# Patient Record
Sex: MALE | Race: BLACK OR AFRICAN AMERICAN | Employment: UNEMPLOYED | URBAN - METROPOLITAN AREA
[De-identification: names, ages, dates, MRNs, and addresses within clinical notes are randomized per-mention and may not be internally consistent; named-entity substitution may affect disease eponyms.]

---

## 2018-03-17 ENCOUNTER — IP HISTORICAL/CONVERTED ENCOUNTER (OUTPATIENT)
Dept: OTHER | Age: 68
End: 2018-03-17

## 2018-11-28 ENCOUNTER — OP HISTORICAL/CONVERTED ENCOUNTER (OUTPATIENT)
Dept: OTHER | Age: 68
End: 2018-11-28

## 2019-04-16 ENCOUNTER — OP HISTORICAL/CONVERTED ENCOUNTER (OUTPATIENT)
Dept: OTHER | Age: 69
End: 2019-04-16

## 2019-08-15 ENCOUNTER — OP HISTORICAL/CONVERTED ENCOUNTER (OUTPATIENT)
Dept: OTHER | Age: 69
End: 2019-08-15

## 2019-08-27 ENCOUNTER — OP HISTORICAL/CONVERTED ENCOUNTER (OUTPATIENT)
Dept: OTHER | Age: 69
End: 2019-08-27

## 2019-10-04 ENCOUNTER — OP HISTORICAL/CONVERTED ENCOUNTER (OUTPATIENT)
Dept: OTHER | Age: 69
End: 2019-10-04

## 2019-10-17 ENCOUNTER — OP HISTORICAL/CONVERTED ENCOUNTER (OUTPATIENT)
Dept: OTHER | Age: 69
End: 2019-10-17

## 2022-06-04 ENCOUNTER — HOSPITAL ENCOUNTER (INPATIENT)
Age: 72
LOS: 17 days | Discharge: NURSING, REHAB OR LONG-TERM CARE | DRG: 330 | End: 2022-06-21
Attending: STUDENT IN AN ORGANIZED HEALTH CARE EDUCATION/TRAINING PROGRAM | Admitting: INTERNAL MEDICINE
Payer: MEDICARE

## 2022-06-04 ENCOUNTER — APPOINTMENT (OUTPATIENT)
Dept: GENERAL RADIOLOGY | Age: 72
DRG: 330 | End: 2022-06-04
Attending: STUDENT IN AN ORGANIZED HEALTH CARE EDUCATION/TRAINING PROGRAM
Payer: MEDICARE

## 2022-06-04 ENCOUNTER — APPOINTMENT (OUTPATIENT)
Dept: CT IMAGING | Age: 72
DRG: 330 | End: 2022-06-04
Attending: STUDENT IN AN ORGANIZED HEALTH CARE EDUCATION/TRAINING PROGRAM
Payer: MEDICARE

## 2022-06-04 ENCOUNTER — APPOINTMENT (OUTPATIENT)
Dept: ENDOSCOPY | Age: 72
DRG: 330 | End: 2022-06-04
Attending: INTERNAL MEDICINE
Payer: MEDICARE

## 2022-06-04 DIAGNOSIS — K56.2 SIGMOID VOLVULUS (HCC): Primary | ICD-10-CM

## 2022-06-04 DIAGNOSIS — I70.202 TIBIAL ARTERY OCCLUSION, LEFT (HCC): ICD-10-CM

## 2022-06-04 DIAGNOSIS — L97.509 FOOT ULCER DUE TO SECONDARY DM (HCC): ICD-10-CM

## 2022-06-04 DIAGNOSIS — I73.9 PVD (PERIPHERAL VASCULAR DISEASE) (HCC): ICD-10-CM

## 2022-06-04 DIAGNOSIS — E13.621 FOOT ULCER DUE TO SECONDARY DM (HCC): ICD-10-CM

## 2022-06-04 DIAGNOSIS — I70.202 PERONEAL ARTERY OCCLUSION, LEFT (HCC): ICD-10-CM

## 2022-06-04 LAB
ALBUMIN SERPL-MCNC: 3.6 G/DL (ref 3.5–5)
ALBUMIN/GLOB SERPL: 0.5 {RATIO} (ref 1.1–2.2)
ALP SERPL-CCNC: 166 U/L (ref 45–117)
ALT SERPL-CCNC: 12 U/L (ref 12–78)
AMMONIA PLAS-SCNC: 27 UMOL/L
ANION GAP SERPL CALC-SCNC: 10 MMOL/L (ref 5–15)
AST SERPL W P-5'-P-CCNC: 9 U/L (ref 15–37)
ATRIAL RATE: 129 BPM
BASOPHILS # BLD: 0 K/UL (ref 0–0.1)
BASOPHILS NFR BLD: 0 % (ref 0–1)
BILIRUB SERPL-MCNC: 0.5 MG/DL (ref 0.2–1)
BUN SERPL-MCNC: 30 MG/DL (ref 6–20)
BUN/CREAT SERPL: 15 (ref 12–20)
CA-I BLD-MCNC: 9.8 MG/DL (ref 8.5–10.1)
CALCULATED P AXIS, ECG09: 49 DEGREES
CALCULATED R AXIS, ECG10: 14 DEGREES
CALCULATED T AXIS, ECG11: 48 DEGREES
CHLORIDE SERPL-SCNC: 103 MMOL/L (ref 97–108)
CO2 SERPL-SCNC: 21 MMOL/L (ref 21–32)
CREAT SERPL-MCNC: 2.02 MG/DL (ref 0.7–1.3)
DIAGNOSIS, 93000: NORMAL
DIFFERENTIAL METHOD BLD: ABNORMAL
EOSINOPHIL # BLD: 0 K/UL (ref 0–0.4)
EOSINOPHIL NFR BLD: 0 % (ref 0–7)
ERYTHROCYTE [DISTWIDTH] IN BLOOD BY AUTOMATED COUNT: 15 % (ref 11.5–14.5)
GLOBULIN SER CALC-MCNC: 6.7 G/DL (ref 2–4)
GLUCOSE BLD STRIP.AUTO-MCNC: 144 MG/DL (ref 65–117)
GLUCOSE BLD STRIP.AUTO-MCNC: 166 MG/DL (ref 65–117)
GLUCOSE BLD STRIP.AUTO-MCNC: 243 MG/DL (ref 65–117)
GLUCOSE SERPL-MCNC: 305 MG/DL (ref 65–100)
HCT VFR BLD AUTO: 36.6 % (ref 36.6–50.3)
HGB BLD-MCNC: 11.8 G/DL (ref 12.1–17)
IMM GRANULOCYTES # BLD AUTO: 0.1 K/UL (ref 0–0.04)
IMM GRANULOCYTES NFR BLD AUTO: 1 % (ref 0–0.5)
LACTATE SERPL-SCNC: 2.5 MMOL/L (ref 0.4–2)
LACTATE SERPL-SCNC: 3 MMOL/L (ref 0.4–2)
LYMPHOCYTES # BLD: 1.7 K/UL (ref 0.8–3.5)
LYMPHOCYTES NFR BLD: 18 % (ref 12–49)
MCH RBC QN AUTO: 27.3 PG (ref 26–34)
MCHC RBC AUTO-ENTMCNC: 32.2 G/DL (ref 30–36.5)
MCV RBC AUTO: 84.5 FL (ref 80–99)
MONOCYTES # BLD: 0.9 K/UL (ref 0–1)
MONOCYTES NFR BLD: 9 % (ref 5–13)
NEUTS SEG # BLD: 6.9 K/UL (ref 1.8–8)
NEUTS SEG NFR BLD: 72 % (ref 32–75)
NRBC # BLD: 0 K/UL (ref 0–0.01)
NRBC BLD-RTO: 0 PER 100 WBC
P-R INTERVAL, ECG05: 138 MS
PERFORMED BY, TECHID: ABNORMAL
PLATELET # BLD AUTO: 352 K/UL (ref 150–400)
PMV BLD AUTO: 11.7 FL (ref 8.9–12.9)
POTASSIUM SERPL-SCNC: 3.6 MMOL/L (ref 3.5–5.1)
PROT SERPL-MCNC: 10.3 G/DL (ref 6.4–8.2)
Q-T INTERVAL, ECG07: 298 MS
QRS DURATION, ECG06: 74 MS
QTC CALCULATION (BEZET), ECG08: 436 MS
RBC # BLD AUTO: 4.33 M/UL (ref 4.1–5.7)
SODIUM SERPL-SCNC: 134 MMOL/L (ref 136–145)
VENTRICULAR RATE, ECG03: 129 BPM
WBC # BLD AUTO: 9.5 K/UL (ref 4.1–11.1)

## 2022-06-04 PROCEDURE — 74011250636 HC RX REV CODE- 250/636: Performed by: INTERNAL MEDICINE

## 2022-06-04 PROCEDURE — 87040 BLOOD CULTURE FOR BACTERIA: CPT

## 2022-06-04 PROCEDURE — 74176 CT ABD & PELVIS W/O CONTRAST: CPT

## 2022-06-04 PROCEDURE — 82962 GLUCOSE BLOOD TEST: CPT

## 2022-06-04 PROCEDURE — 74011250636 HC RX REV CODE- 250/636

## 2022-06-04 PROCEDURE — 93005 ELECTROCARDIOGRAM TRACING: CPT

## 2022-06-04 PROCEDURE — 76040000008: Performed by: INTERNAL MEDICINE

## 2022-06-04 PROCEDURE — 99222 1ST HOSP IP/OBS MODERATE 55: CPT | Performed by: SURGERY

## 2022-06-04 PROCEDURE — 96375 TX/PRO/DX INJ NEW DRUG ADDON: CPT

## 2022-06-04 PROCEDURE — 83605 ASSAY OF LACTIC ACID: CPT

## 2022-06-04 PROCEDURE — 74011250636 HC RX REV CODE- 250/636: Performed by: STUDENT IN AN ORGANIZED HEALTH CARE EDUCATION/TRAINING PROGRAM

## 2022-06-04 PROCEDURE — 80053 COMPREHEN METABOLIC PANEL: CPT

## 2022-06-04 PROCEDURE — 85025 COMPLETE CBC W/AUTO DIFF WBC: CPT

## 2022-06-04 PROCEDURE — 74022 RADEX COMPL AQT ABD SERIES: CPT

## 2022-06-04 PROCEDURE — 99285 EMERGENCY DEPT VISIT HI MDM: CPT

## 2022-06-04 PROCEDURE — 82140 ASSAY OF AMMONIA: CPT

## 2022-06-04 PROCEDURE — 0D7N8ZZ DILATION OF SIGMOID COLON, VIA NATURAL OR ARTIFICIAL OPENING ENDOSCOPIC: ICD-10-PCS | Performed by: INTERNAL MEDICINE

## 2022-06-04 PROCEDURE — 65270000029 HC RM PRIVATE

## 2022-06-04 PROCEDURE — 74011000250 HC RX REV CODE- 250: Performed by: INTERNAL MEDICINE

## 2022-06-04 PROCEDURE — 36415 COLL VENOUS BLD VENIPUNCTURE: CPT

## 2022-06-04 PROCEDURE — 96374 THER/PROPH/DIAG INJ IV PUSH: CPT

## 2022-06-04 PROCEDURE — 74011636637 HC RX REV CODE- 636/637: Performed by: INTERNAL MEDICINE

## 2022-06-04 RX ORDER — HALOPERIDOL 5 MG/ML
2 INJECTION INTRAMUSCULAR
Status: DISCONTINUED | OUTPATIENT
Start: 2022-06-04 | End: 2022-06-21 | Stop reason: HOSPADM

## 2022-06-04 RX ORDER — SODIUM CHLORIDE 0.9 % (FLUSH) 0.9 %
5-40 SYRINGE (ML) INJECTION AS NEEDED
Status: DISCONTINUED | OUTPATIENT
Start: 2022-06-04 | End: 2022-06-11

## 2022-06-04 RX ORDER — METOPROLOL SUCCINATE 50 MG/1
20 TABLET, EXTENDED RELEASE ORAL DAILY
COMMUNITY
Start: 2022-03-15

## 2022-06-04 RX ORDER — ONDANSETRON 2 MG/ML
4 INJECTION INTRAMUSCULAR; INTRAVENOUS
Status: DISCONTINUED | OUTPATIENT
Start: 2022-06-04 | End: 2022-06-13

## 2022-06-04 RX ORDER — POTASSIUM CHLORIDE 20 MEQ/1
20 TABLET, EXTENDED RELEASE ORAL DAILY
COMMUNITY
Start: 2022-05-09

## 2022-06-04 RX ORDER — ACETAMINOPHEN 650 MG/1
650 SUPPOSITORY RECTAL
Status: DISCONTINUED | OUTPATIENT
Start: 2022-06-04 | End: 2022-06-21 | Stop reason: HOSPADM

## 2022-06-04 RX ORDER — METFORMIN HYDROCHLORIDE 500 MG/1
500 TABLET ORAL 2 TIMES DAILY
COMMUNITY
Start: 2022-03-13

## 2022-06-04 RX ORDER — INSULIN DETEMIR 100 [IU]/ML
14 INJECTION, SOLUTION SUBCUTANEOUS DAILY
COMMUNITY
Start: 2022-05-25

## 2022-06-04 RX ORDER — ENOXAPARIN SODIUM 100 MG/ML
40 INJECTION SUBCUTANEOUS DAILY
Status: DISCONTINUED | OUTPATIENT
Start: 2022-06-04 | End: 2022-06-08

## 2022-06-04 RX ORDER — OXYBUTYNIN CHLORIDE 5 MG/1
5 TABLET ORAL DAILY
COMMUNITY
Start: 2022-05-21

## 2022-06-04 RX ORDER — FINASTERIDE 5 MG/1
5 TABLET, FILM COATED ORAL DAILY
COMMUNITY
Start: 2022-05-11

## 2022-06-04 RX ORDER — MIDAZOLAM HYDROCHLORIDE 1 MG/ML
INJECTION INTRAMUSCULAR; INTRAVENOUS AS NEEDED
Status: DISCONTINUED | OUTPATIENT
Start: 2022-06-04 | End: 2022-06-08 | Stop reason: HOSPADM

## 2022-06-04 RX ORDER — SIMETHICONE 125 MG
1 CAPSULE ORAL 3 TIMES DAILY
COMMUNITY

## 2022-06-04 RX ORDER — MAGNESIUM SULFATE 100 %
4 CRYSTALS MISCELLANEOUS AS NEEDED
Status: DISCONTINUED | OUTPATIENT
Start: 2022-06-04 | End: 2022-06-21 | Stop reason: HOSPADM

## 2022-06-04 RX ORDER — FUROSEMIDE 40 MG/1
40 TABLET ORAL DAILY
COMMUNITY
Start: 2022-05-30

## 2022-06-04 RX ORDER — POLYETHYLENE GLYCOL 3350 17 G/17G
17 POWDER, FOR SOLUTION ORAL DAILY PRN
Status: DISCONTINUED | OUTPATIENT
Start: 2022-06-04 | End: 2022-06-21 | Stop reason: HOSPADM

## 2022-06-04 RX ORDER — TAMSULOSIN HYDROCHLORIDE 0.4 MG/1
0.4 CAPSULE ORAL DAILY
COMMUNITY
Start: 2022-05-20

## 2022-06-04 RX ORDER — DILTIAZEM HYDROCHLORIDE 180 MG/1
180 CAPSULE, EXTENDED RELEASE ORAL DAILY
COMMUNITY
Start: 2022-05-22

## 2022-06-04 RX ORDER — PREGABALIN 100 MG/1
100 CAPSULE ORAL 2 TIMES DAILY
COMMUNITY
Start: 2022-05-21 | End: 2022-06-04

## 2022-06-04 RX ORDER — DEXTROSE MONOHYDRATE 100 MG/ML
0-250 INJECTION, SOLUTION INTRAVENOUS AS NEEDED
Status: DISCONTINUED | OUTPATIENT
Start: 2022-06-04 | End: 2022-06-21 | Stop reason: HOSPADM

## 2022-06-04 RX ORDER — PANTOPRAZOLE SODIUM 20 MG/1
20 TABLET, DELAYED RELEASE ORAL DAILY
COMMUNITY
Start: 2022-04-30

## 2022-06-04 RX ORDER — SODIUM CHLORIDE 0.9 % (FLUSH) 0.9 %
5-40 SYRINGE (ML) INJECTION EVERY 8 HOURS
Status: DISCONTINUED | OUTPATIENT
Start: 2022-06-04 | End: 2022-06-11

## 2022-06-04 RX ORDER — INSULIN LISPRO 100 [IU]/ML
INJECTION, SOLUTION INTRAVENOUS; SUBCUTANEOUS EVERY 6 HOURS
Status: DISCONTINUED | OUTPATIENT
Start: 2022-06-04 | End: 2022-06-21 | Stop reason: HOSPADM

## 2022-06-04 RX ORDER — SPIRONOLACTONE 25 MG/1
25 TABLET ORAL DAILY
COMMUNITY
Start: 2022-05-20

## 2022-06-04 RX ORDER — ATORVASTATIN CALCIUM 40 MG/1
40 TABLET, FILM COATED ORAL DAILY
COMMUNITY
Start: 2022-05-20

## 2022-06-04 RX ORDER — PREGABALIN 100 MG/1
100 CAPSULE ORAL 2 TIMES DAILY
COMMUNITY

## 2022-06-04 RX ORDER — ONDANSETRON 2 MG/ML
INJECTION INTRAMUSCULAR; INTRAVENOUS
Status: COMPLETED
Start: 2022-06-04 | End: 2022-06-04

## 2022-06-04 RX ORDER — INSULIN GLARGINE 100 [IU]/ML
10 INJECTION, SOLUTION SUBCUTANEOUS DAILY
Status: DISCONTINUED | OUTPATIENT
Start: 2022-06-04 | End: 2022-06-04

## 2022-06-04 RX ORDER — SODIUM CHLORIDE 9 MG/ML
100 INJECTION, SOLUTION INTRAVENOUS CONTINUOUS
Status: DISPENSED | OUTPATIENT
Start: 2022-06-04 | End: 2022-06-04

## 2022-06-04 RX ORDER — LINAGLIPTIN 5 MG/1
5 TABLET, FILM COATED ORAL DAILY
COMMUNITY

## 2022-06-04 RX ORDER — FENTANYL CITRATE 50 UG/ML
INJECTION, SOLUTION INTRAMUSCULAR; INTRAVENOUS AS NEEDED
Status: DISCONTINUED | OUTPATIENT
Start: 2022-06-04 | End: 2022-06-08 | Stop reason: HOSPADM

## 2022-06-04 RX ORDER — LABETALOL HCL 20 MG/4 ML
10 SYRINGE (ML) INTRAVENOUS
Status: COMPLETED | OUTPATIENT
Start: 2022-06-04 | End: 2022-06-19

## 2022-06-04 RX ORDER — MORPHINE SULFATE 4 MG/ML
4 INJECTION INTRAVENOUS ONCE
Status: COMPLETED | OUTPATIENT
Start: 2022-06-04 | End: 2022-06-04

## 2022-06-04 RX ORDER — ONDANSETRON 4 MG/1
4 TABLET, ORALLY DISINTEGRATING ORAL
Status: DISCONTINUED | OUTPATIENT
Start: 2022-06-04 | End: 2022-06-13

## 2022-06-04 RX ORDER — BETHANECHOL CHLORIDE 25 MG/1
25 TABLET ORAL 3 TIMES DAILY
COMMUNITY
Start: 2022-05-14

## 2022-06-04 RX ORDER — INSULIN GLARGINE 100 [IU]/ML
14 INJECTION, SOLUTION SUBCUTANEOUS DAILY
Status: DISCONTINUED | OUTPATIENT
Start: 2022-06-04 | End: 2022-06-21 | Stop reason: HOSPADM

## 2022-06-04 RX ORDER — ACETAMINOPHEN 325 MG/1
650 TABLET ORAL
Status: DISCONTINUED | OUTPATIENT
Start: 2022-06-04 | End: 2022-06-21 | Stop reason: HOSPADM

## 2022-06-04 RX ORDER — ONDANSETRON 2 MG/ML
4 INJECTION INTRAMUSCULAR; INTRAVENOUS
Status: COMPLETED | OUTPATIENT
Start: 2022-06-04 | End: 2022-06-04

## 2022-06-04 RX ADMIN — MORPHINE SULFATE 4 MG: 4 INJECTION, SOLUTION INTRAMUSCULAR; INTRAVENOUS at 04:30

## 2022-06-04 RX ADMIN — SODIUM CHLORIDE, PRESERVATIVE FREE 5 ML: 5 INJECTION INTRAVENOUS at 06:00

## 2022-06-04 RX ADMIN — SODIUM CHLORIDE, PRESERVATIVE FREE 10 ML: 5 INJECTION INTRAVENOUS at 20:28

## 2022-06-04 RX ADMIN — INSULIN LISPRO 4 UNITS: 100 INJECTION, SOLUTION INTRAVENOUS; SUBCUTANEOUS at 06:20

## 2022-06-04 RX ADMIN — ENOXAPARIN SODIUM 40 MG: 100 INJECTION SUBCUTANEOUS at 10:00

## 2022-06-04 RX ADMIN — INSULIN LISPRO 2 UNITS: 100 INJECTION, SOLUTION INTRAVENOUS; SUBCUTANEOUS at 12:55

## 2022-06-04 RX ADMIN — ONDANSETRON 4 MG: 2 INJECTION INTRAMUSCULAR; INTRAVENOUS at 02:38

## 2022-06-04 RX ADMIN — MORPHINE SULFATE 4 MG: 4 INJECTION, SOLUTION INTRAMUSCULAR; INTRAVENOUS at 02:42

## 2022-06-04 RX ADMIN — SODIUM CHLORIDE 100 ML/HR: 9 INJECTION, SOLUTION INTRAVENOUS at 04:23

## 2022-06-04 RX ADMIN — SODIUM CHLORIDE 500 ML: 9 INJECTION, SOLUTION INTRAVENOUS at 02:12

## 2022-06-04 RX ADMIN — SODIUM CHLORIDE, PRESERVATIVE FREE 10 ML: 5 INJECTION INTRAVENOUS at 17:16

## 2022-06-04 RX ADMIN — INSULIN GLARGINE 14 UNITS: 100 INJECTION, SOLUTION SUBCUTANEOUS at 10:00

## 2022-06-04 NOTE — ED NOTES
TRANSFER - OUT REPORT:    Verbal report given to alana (name) on Alex Olivia  being transferred to (unit) for routine progression of care    Report consisted of patients Situation, Background, Assessment and   Recommendations(SBAR). Information from the following report(s) SBAR, ED Summary, Procedure Summary and MAR was reviewed with the receiving nurse. Lines:   Peripheral IV 06/04/22 Anterior;Left;Proximal Forearm (Active)        Opportunity for questions and clarification was provided.       Patient transported with:   [email or web address]

## 2022-06-04 NOTE — CONSULTS
Consult    Patient: Syed Sanchez MRN: [id number]  SSN: [id number]    YOB: 1950  Age: 66 y.o. Sex: male      Subjective:      Syed Sanchez is a 66 y.o. male who is being seen for abdominal distention,  Multiple medical issue with nursing home transferred to the emergency room with abdominal distention, history of present from patient's sister, patient had some meals last week, had abdominal discomfort. Nursing staff noted that abdominal distention do not have a good vomiting, patient was brought to emergency for evaluation, no documented fever chills nausea vomiting, patient was not to speak due to the deafness. No documented GI bleeding,  Past Medical History:   Diagnosis Date    Anemia     BPH (benign prostatic hyperplasia)     Chronic kidney disease     Diabetes ([facility] [address]     Hypercholesteremia     Hyperlipidemia     Neurological disorder     Nonspeaking deaf      History reviewed. No pertinent surgical history. History reviewed. No pertinent family history.   Social History     Tobacco Use    Smoking status: Not on file    Smokeless tobacco: Not on file   Substance Use Topics    Alcohol use: Not Currently      Current Facility-Administered Medications   Medication Dose Route Frequency Provider Last Rate Last Admin    insulin lispro (HUMALOG) injection   SubCUTAneous Q6H Tiffanie Prasad MD   2 Units at 06/04/22 1255    glucose chewable tablet 16 g  4 Tablet Oral PRN Tiffanie Prasad MD        glucagon (GLUCAGEN) injection 1 mg  1 mg IntraMUSCular PRN Nguyen Fischer MD        dextrose 10% infusion 0-250 mL  0-250 mL IntraVENous PRN Nguyen Fischer MD        labetaloL (NORMODYNE;TRANDATE) 20 mg/4 mL (5 mg/mL) injection 10 mg  10 mg IntraVENous Q4H PRN Nguyen Fischer MD        sodium chloride (NS) flush 5-40 mL  5-40 mL IntraVENous Q8H Tiffanie Prasad MD   5 mL at 06/04/22 0600    sodium chloride (NS) flush 5-40 mL  5-40 mL IntraVENous PRN Nguyen Fischer MD        acetaminophen (TYLENOL) tablet 650 mg  650 mg Oral Q6H PRN Odin Prasad MD        Or    acetaminophen (TYLENOL) suppository 650 mg  650 mg Rectal Q6H PRN Odin Prasad MD        polyethylene glycol (MIRALAX) packet 17 g  17 g Oral DAILY PRN Yvonne Amador MD        ondansetron (ZOFRAN ODT) tablet 4 mg  4 mg Oral Q8H PRN Odin Prasad MD        Or    ondansetron (ZOFRAN) injection 4 mg  4 mg IntraVENous Q6H PRN Yvonne Amador MD        enoxaparin (LOVENOX) injection 40 mg  40 mg SubCUTAneous DAILY Odin Prasad MD   40 mg at 06/04/22 1000    haloperidol lactate (HALDOL) injection 2 mg  2 mg IntraMUSCular Q4H PRN Yvonne Amador MD        0.9% sodium chloride infusion  100 mL/hr IntraVENous CONTINUOUS Irina KamranOdin goldberg  mL/hr at 06/04/22 0423 100 mL/hr at 06/04/22 0423    insulin glargine (LANTUS) injection 14 Units  14 Units SubCUTAneous DAILY Yvonne Amador MD   14 Units at 06/04/22 1000     Current Outpatient Medications   Medication Sig Dispense Refill    pregabalin (LYRICA) 100 mg capsule Take 100 mg by mouth two (2) times a day.  atorvastatin (LIPITOR) 40 mg tablet Take 40 mg by mouth daily.  bethanechol (URECHOLINE) 25 mg tablet Take 25 mg by mouth three (3) times daily.  dilTIAZem ER (DILACOR XR) 180 mg capsule Take 180 mg by mouth daily.  finasteride (PROSCAR) 5 mg tablet Take 5 mg by mouth daily.  furosemide (LASIX) 40 mg tablet Take 40 mg by mouth daily.  Levemir U-100 Insulin 100 unit/mL injection 14 Units by SubCUTAneous route daily.  linaGLIPtin (Tradjenta) 5 mg tablet Take 5 mg by mouth daily.  metFORMIN (GLUCOPHAGE) 500 mg tablet Take 500 mg by mouth two (2) times a day.  metoprolol succinate (TOPROL-XL) 50 mg XL tablet Take 20 mg by mouth daily.  oxybutynin (DITROPAN) 5 mg tablet Take 5 mg by mouth daily.  pantoprazole (PROTONIX) 20 mg tablet Take 20 mg by mouth daily.       potassium chloride (K-DUR, KLOR-CON M20) 20 mEq tablet Take 20 mEq by mouth daily.  simethicone (GAS-X) 125 mg capsule Take 1 Capsule by mouth three (3) times daily.  spironolactone (ALDACTONE) 25 mg tablet Take 25 mg by mouth daily.  tamsulosin (FLOMAX) 0.4 mg capsule Take 0.4 mg by mouth daily. Allergies   Allergen Reactions    Lisinopril Unknown (comments)       Review of Systems:  Review of Systems   Unable to perform ROS: Medical condition   Eyes: Negative for double vision. Gastrointestinal: Positive for abdominal pain. Objective:     Vitals:    06/04/22 0500 06/04/22 0615 06/04/22 1115 06/04/22 1246   BP: (!) 161/88 (!) 144/84 (!) 169/89 (!) 141/77   Pulse: (!) 118 (!) 116 (!) 116 (!) 117   Resp: 17 15 13 16   Temp:       SpO2: 95% 96% 96% 96%   Weight:       Height:            Physical Exam:  Physical Exam  Constitutional:       Appearance: He is ill-appearing. HENT:      Head: Atraumatic. Eyes:      General: No scleral icterus. Extraocular Movements: Extraocular movements intact. Cardiovascular:      Rate and Rhythm: Normal rate. Abdominal:      General: There is distension. Tenderness: There is abdominal tenderness. There is no rebound. Skin:     General: Skin is warm. Neurological:      Mental Status: Mental status is at baseline. Recent Results (from the past 24 hour(s))   CBC WITH AUTOMATED DIFF    Collection Time: 06/04/22  1:57 AM   Result Value Ref Range    WBC 9.5 4.1 - 11.1 K/uL    RBC 4.33 4.10 - 5.70 M/uL    HGB 11.8 (L) 12.1 - 17.0 g/dL    HCT 36.6 36.6 - 50.3 %    MCV 84.5 80.0 - 99.0 FL    MCH 27.3 26.0 - 34.0 PG    MCHC 32.2 30.0 - 36.5 g/dL    RDW 15.0 (H) 11.5 - 14.5 %    PLATELET 590 146 - 012 K/uL    MPV 11.7 8.9 - 12.9 FL    NRBC 0.0 0.0  WBC    ABSOLUTE NRBC 0.00 0.00 - 0.01 K/uL    NEUTROPHILS 72 32 - 75 %    LYMPHOCYTES 18 12 - 49 %    MONOCYTES 9 5 - 13 %    EOSINOPHILS 0 0 - 7 %    BASOPHILS 0 0 - 1 %    IMMATURE GRANULOCYTES 1 (H) 0 - 0.5 %    ABS.  NEUTROPHILS 6.9 1.8 - 8.0 K/UL ABS. LYMPHOCYTES 1.7 0.8 - 3.5 K/UL    ABS. MONOCYTES 0.9 0.0 - 1.0 K/UL    ABS. EOSINOPHILS 0.0 0.0 - 0.4 K/UL    ABS. BASOPHILS 0.0 0.0 - 0.1 K/UL    ABS. IMM. GRANS. 0.1 (H) 0.00 - 0.04 K/UL    DF AUTOMATED     METABOLIC PANEL, COMPREHENSIVE    Collection Time: 06/04/22  1:57 AM   Result Value Ref Range    Sodium 134 (L) 136 - 145 mmol/L    Potassium 3.6 3.5 - 5.1 mmol/L    Chloride 103 97 - 108 mmol/L    CO2 21 21 - 32 mmol/L    Anion gap 10 5 - 15 mmol/L    Glucose 305 (H) 65 - 100 mg/dL    BUN 30 (H) 6 - 20 mg/dL    Creatinine 2.02 (H) 0.70 - 1.30 mg/dL    BUN/Creatinine ratio 15 12 - 20      GFR est AA 39 (L) >60 ml/min/1.73m2    GFR est non-AA 33 (L) >60 ml/min/1.73m2    Calcium 9.8 8.5 - 10.1 mg/dL    Bilirubin, total 0.5 0.2 - 1.0 mg/dL    AST (SGOT) 9 (L) 15 - 37 U/L    ALT (SGPT) 12 12 - 78 U/L    Alk. phosphatase 166 (H) 45 - 117 U/L    Protein, total 10.3 (H) 6.4 - 8.2 g/dL    Albumin 3.6 3.5 - 5.0 g/dL    Globulin 6.7 (H) 2.0 - 4.0 g/dL    A-G Ratio 0.5 (L) 1.1 - 2.2     LACTIC ACID    Collection Time: 06/04/22  1:57 AM   Result Value Ref Range    Lactic acid 3.0 (HH) 0.4 - 2.0 mmol/L   AMMONIA    Collection Time: 06/04/22  1:57 AM   Result Value Ref Range    Ammonia, plasma 27 <32 umol/L   LACTIC ACID    Collection Time: 06/04/22  4:40 AM   Result Value Ref Range    Lactic acid 2.5 (HH) 0.4 - 2.0 mmol/L   GLUCOSE, POC    Collection Time: 06/04/22  6:14 AM   Result Value Ref Range    Glucose (POC) 243 (H) 65 - 117 mg/dL    Performed by Rosibel POC    Collection Time: 06/04/22 11:18 AM   Result Value Ref Range    Glucose (POC) 166 (H) 65 - 117 mg/dL    Performed by LOVELY [facility]BRECHTMayo Clinic RochesterGEN         XR ABD ACUTE W 1 V CHEST   Final Result   1. Enteric tube insertion. 2. Persistent distention of small and large bowel. 3. Mild bibasilar atelectasis. CT ABD PELV WO CONT   Final Result   Sigmoid volvulus.          Assessment:     Hospital Problems  Never Reviewed          Codes Class Noted POA    Sigmoid volvulus ([facility] [address] ICD-10-CM: K56.2  ICD-9-CM: 560.2  6/4/2022 Unknown          Severe abdominal distention,  Sigmoid colon volvulus,  Rule out ischemia  Plan:   Continue NG tube suction  IV hydration  Potassium replacement  Dr. Marvina Krabbe to follow for possible surgical ablation  Antibiotic, wound care for diabetic foot      Will do an urgent sigmoidoscopy with decompression,   indication, risks, options discussed with patient sister  High Risks patient for perforations,  Signed By: Varsha Brown MD     June 4, 2022         Thank you for allowing me to participate in this patients care  Cc Referring Physician   None

## 2022-06-04 NOTE — Clinical Note
sheath exchanged for SHEATH INTRO 6FR L10CM MINI GWIRE L45CM 0.035IN COR KINK. Upon evaluation of the common femoral artery stick using fluoroscopy, the access site puncture was within the safe zone.

## 2022-06-04 NOTE — PROGRESS NOTES
TRANSFER - IN REPORT:    Verbal report received from Anatoliy Robles RN(name) on Ghislaine Kirk  being received from ED(unit) for routine progression of care      Report consisted of patients Situation, Background, Assessment and   Recommendations(SBAR). Information from the following report(s) SBAR, Kardex, ED Summary, Procedure Summary, Intake/Output, MAR, Recent Results and Med Rec Status was reviewed with the receiving nurse. Opportunity for questions and clarification was provided. Assessment completed upon patients arrival to unit and care assumed.

## 2022-06-04 NOTE — PROGRESS NOTES
Hospitalist Progress Note         Garrison Olivo MD          Daily Progress Note: 6/4/2022      Subjective: The patient is seen for follow  up.  66-year-old with history of schizophrenia, hyperlipidemia, deaf, non verbal admitted for work-up of abdominal distention. CT abdomen pelvis suspicious for sigmoid volvulus. Patient seen by general surgeon with plans of colonic decompression. Admission H&P as well as care plan reviewed  Total time spent with patient: 30 minutes.     Garrison Olivo MD

## 2022-06-04 NOTE — CONSULTS
History and Physical    Chief complaints: Sigmoid volvulus   History of Presenting Illness:  Halie Fischer is a 66 y.o. very pleasant man presents to hospital with abdominal distention. Patient seen and examined in emergency room. Patient has been heavily sedated when I saw the patient. Patient is not able to provide any history. Chart reviewed. CT scans reviewed. Patient has significant past medical history of chronic kidney disease, diabetes and schizophrenia and patient is not speaking due to deaf. Past Medical History:   Diagnosis Date    Anemia     BPH (benign prostatic hyperplasia)     Chronic kidney disease     Diabetes ([facility] [address]     Hypercholesteremia     Hyperlipidemia     Neurological disorder     Nonspeaking deaf       History reviewed. No pertinent surgical history. History reviewed. No pertinent family history. Social History     Tobacco Use    Smoking status: Not on file    Smokeless tobacco: Not on file   Substance Use Topics    Alcohol use: Not Currently       Prior to Admission medications    Medication Sig Start Date End Date Taking? Authorizing Provider   pregabalin (LYRICA) 100 mg capsule Take 100 mg by mouth two (2) times a day. Yes Provider, Historical   atorvastatin (LIPITOR) 40 mg tablet Take 40 mg by mouth daily. 5/20/22   Provider, Historical   bethanechol (URECHOLINE) 25 mg tablet Take 25 mg by mouth three (3) times daily. 5/14/22   Provider, Historical   dilTIAZem ER (DILACOR XR) 180 mg capsule Take 180 mg by mouth daily. 5/22/22   Provider, Historical   finasteride (PROSCAR) 5 mg tablet Take 5 mg by mouth daily. 5/11/22   Provider, Historical   furosemide (LASIX) 40 mg tablet Take 40 mg by mouth daily. 5/30/22   Provider, Historical   Levemir U-100 Insulin 100 unit/mL injection 14 Units by SubCUTAneous route daily. 5/25/22   Provider, Historical   linaGLIPtin (Tradjenta) 5 mg tablet Take 5 mg by mouth daily.     Provider, Historical   metFORMIN (GLUCOPHAGE) 500 mg tablet Take 500 mg by mouth two (2) times a day. 3/13/22   Provider, Historical   metoprolol succinate (TOPROL-XL) 50 mg XL tablet Take 20 mg by mouth daily. 3/15/22   Provider, Historical   oxybutynin (DITROPAN) 5 mg tablet Take 5 mg by mouth daily. 5/21/22   Provider, Historical   pantoprazole (PROTONIX) 20 mg tablet Take 20 mg by mouth daily. 4/30/22   Provider, Historical   potassium chloride (K-DUR, KLOR-CON M20) 20 mEq tablet Take 20 mEq by mouth daily. 5/9/22   Provider, Historical   simethicone (GAS-X) 125 mg capsule Take 1 Capsule by mouth three (3) times daily. Provider, Historical   spironolactone (ALDACTONE) 25 mg tablet Take 25 mg by mouth daily. 5/20/22   Provider, Historical   tamsulosin (FLOMAX) 0.4 mg capsule Take 0.4 mg by mouth daily. 5/20/22   Provider, Historical     Allergies   Allergen Reactions    Lisinopril Unknown (comments)        Review of Systems:  Pertinent review of systems discussed in HPI, and rest of organ systems personally reviewed and they are negative. Objective:   Vital signs reviewed:      Visit Vitals  BP (!) 144/84 (BP 1 Location: Right upper arm, BP Patient Position: At rest)   Pulse (!) 116   Temp 98.9 °F (37.2 °C)   Resp 15   Ht 5' 10\" (1.778 m)   Wt 185 lb (83.9 kg)   SpO2 96%   BMI 26.54 kg/m²       Physical Exam:   General appearance:   Patient is quite coherent. Head and neck atraumatic normocephalic. ENT shows normal oral mucosa, no jaundice no hoarse voice. Eyes: Pupil equal gaze appropriate. Cardiac system regular rate rhythm. Pulmonary: No audible wheeze. Chest wall: Chest wall excursion normal with respiration cycle, there is no deformity or chest trauma. Abdomen: Abdomen is grossly distended and taught,   Neurologic: Nonfocal.  Cranial nerves intact, no new focal findings. Musculoskeletal system:  Motor function normal limits, motor function 5 out of 5, range of motion normal in all 4 extremity  Skin: Warm and moist.  Hematologic system: No obvious bruising. Psychosocial: Appropriate and cooperative. Vascular examination: Lower and upper extremities warm to touch, no signs of ischemia or cyanosis. Data Review: Labs are reviewed. Discussed  Recent Results (from the past 24 hour(s))   CBC WITH AUTOMATED DIFF    Collection Time: 06/04/22  1:57 AM   Result Value Ref Range    WBC 9.5 4.1 - 11.1 K/uL    RBC 4.33 4.10 - 5.70 M/uL    HGB 11.8 (L) 12.1 - 17.0 g/dL    HCT 36.6 36.6 - 50.3 %    MCV 84.5 80.0 - 99.0 FL    MCH 27.3 26.0 - 34.0 PG    MCHC 32.2 30.0 - 36.5 g/dL    RDW 15.0 (H) 11.5 - 14.5 %    PLATELET 477 583 - 732 K/uL    MPV 11.7 8.9 - 12.9 FL    NRBC 0.0 0.0  WBC    ABSOLUTE NRBC 0.00 0.00 - 0.01 K/uL    NEUTROPHILS 72 32 - 75 %    LYMPHOCYTES 18 12 - 49 %    MONOCYTES 9 5 - 13 %    EOSINOPHILS 0 0 - 7 %    BASOPHILS 0 0 - 1 %    IMMATURE GRANULOCYTES 1 (H) 0 - 0.5 %    ABS. NEUTROPHILS 6.9 1.8 - 8.0 K/UL    ABS. LYMPHOCYTES 1.7 0.8 - 3.5 K/UL    ABS. MONOCYTES 0.9 0.0 - 1.0 K/UL    ABS. EOSINOPHILS 0.0 0.0 - 0.4 K/UL    ABS. BASOPHILS 0.0 0.0 - 0.1 K/UL    ABS. IMM. GRANS. 0.1 (H) 0.00 - 0.04 K/UL    DF AUTOMATED     METABOLIC PANEL, COMPREHENSIVE    Collection Time: 06/04/22  1:57 AM   Result Value Ref Range    Sodium 134 (L) 136 - 145 mmol/L    Potassium 3.6 3.5 - 5.1 mmol/L    Chloride 103 97 - 108 mmol/L    CO2 21 21 - 32 mmol/L    Anion gap 10 5 - 15 mmol/L    Glucose 305 (H) 65 - 100 mg/dL    BUN 30 (H) 6 - 20 mg/dL    Creatinine 2.02 (H) 0.70 - 1.30 mg/dL    BUN/Creatinine ratio 15 12 - 20      GFR est AA 39 (L) >60 ml/min/1.73m2    GFR est non-AA 33 (L) >60 ml/min/1.73m2    Calcium 9.8 8.5 - 10.1 mg/dL    Bilirubin, total 0.5 0.2 - 1.0 mg/dL    AST (SGOT) 9 (L) 15 - 37 U/L    ALT (SGPT) 12 12 - 78 U/L    Alk.  phosphatase 166 (H) 45 - 117 U/L    Protein, total 10.3 (H) 6.4 - 8.2 g/dL    Albumin 3.6 3.5 - 5.0 g/dL    Globulin 6.7 (H) 2.0 - 4.0 g/dL    A-G Ratio 0.5 (L) 1.1 - 2.2     LACTIC ACID    Collection Time: 06/04/22  1:57 AM   Result Value Ref Range    Lactic acid 3.0 (HH) 0.4 - 2.0 mmol/L   AMMONIA    Collection Time: 06/04/22  1:57 AM   Result Value Ref Range    Ammonia, plasma 27 <32 umol/L   LACTIC ACID    Collection Time: 06/04/22  4:40 AM   Result Value Ref Range    Lactic acid 2.5 (HH) 0.4 - 2.0 mmol/L   GLUCOSE, POC    Collection Time: 06/04/22  6:14 AM   Result Value Ref Range    Glucose (POC) 243 (H) 65 - 117 mg/dL    Performed by Poonam Lopes reviewed: discussed as below. No name on file. Assessment:     Active Problems:    Sigmoid volvulus ([address] (6/4/2022)        Plan:                     Imagings reviewed: discussed as below. No name on file. Assessment:     Active Problems:    Sigmoid volvulus ([address] (6/4/2022)        Plan:     I did review CT scan abdomen pelvis. Small bowel and stomach is grossly distended. Also colonic distention noted. There is no stranding of the colonic bowel wall. Patient has NG tube in place. Patient will need urgent  colonic decompression. Recommend GI consultation for possible flexible sigmoidoscopy for colonic decompression. If this can not be done, I will try to do rectal decompression at bedside. If conservative management does not improve or recurrence of sigmoid volvulus after successful decompression of colon, patient may require surgical intervention. I will continue monitor his progress.

## 2022-06-04 NOTE — PROCEDURES
Summary of a sigmoid colonoscopy    Indication sigmoid colon volvulus    Findings  Liquid stool in the rectal  A twisted point at the sigmoid colon, 34 cm from the anal enter  Severe dilated  sigmoid colon noted, decompression performed, large amount of air was sucked out, a colon tube placed without event      Recommendations:  Continue NG, rectal tube suction intermittent.   N.p.o. for now  IV hydrations  Repeat KUB in the morning  May need a surgical intervention should the volvulus persist

## 2022-06-04 NOTE — ED TRIAGE NOTES
Pt arrives via EMS. Pt resides at [facility]. Pt has abd distension and n/v. Nursing home put in order for pt to get a liver scan, but the company the nursing facility uses says that they will be unsure when he can get the scan. Pt is deaf.

## 2022-06-04 NOTE — H&P
History and Physical    Patient: Lakesha Aiken MRN: [id number]  SSN: [id number]    YOB: 1950  Age: 66 y.o. Sex: male      Subjective:      Lakesha Aiken is a 66 y.o. male with PMH of BPH, CKD, diabetes, HLP, schizophrenia and deaf (non-speaking). He presented from [facility] for abdominal distention, nausea and vomiting. Unfortunately patient couldn't speak and unable to give history. No additional history was available from EMS. In the ED, noted creatinine 2, unknown baseline. LA of 3. CT scan showed sigmoid volvulus. When NG tube placed, patient kept trying to pull it out and had to be put on soft restrain. Past Medical History:   Diagnosis Date    Anemia     BPH (benign prostatic hyperplasia)     Chronic kidney disease     Diabetes ([facility] [address]     Hypercholesteremia     Hyperlipidemia     Neurological disorder     Nonspeaking deaf      History reviewed. No pertinent surgical history. History reviewed. No pertinent family history. Social History     Tobacco Use    Smoking status: Not on file    Smokeless tobacco: Not on file   Substance Use Topics    Alcohol use: Not Currently      Prior to Admission medications    Medication Sig Start Date End Date Taking? Authorizing Provider   atorvastatin (LIPITOR) 40 mg tablet Take 40 mg by mouth daily. 5/20/22   Provider, Historical   bethanechol (URECHOLINE) 25 mg tablet Take 25 mg by mouth three (3) times daily. 5/14/22   Provider, Historical   dilTIAZem ER (DILACOR XR) 180 mg capsule Take 180 mg by mouth daily. 5/22/22   Provider, Historical   finasteride (PROSCAR) 5 mg tablet Take 5 mg by mouth daily. 5/11/22   Provider, Historical   furosemide (LASIX) 40 mg tablet Take 40 mg by mouth daily. 5/30/22   Provider, Historical   Levemir U-100 Insulin 100 unit/mL injection 10 Units by SubCUTAneous route daily. 5/25/22   Provider, Historical   linaGLIPtin (Tradjenta) 5 mg tablet Take 5 mg by mouth daily.     Provider, Historical metFORMIN (GLUCOPHAGE) 500 mg tablet Take 500 mg by mouth two (2) times a day. 3/13/22   Provider, Historical   metoprolol succinate (TOPROL-XL) 50 mg XL tablet Take 20 mg by mouth daily. 3/15/22   Provider, Historical   oxybutynin (DITROPAN) 5 mg tablet Take 5 mg by mouth daily. 5/21/22   Provider, Historical   pantoprazole (PROTONIX) 20 mg tablet Take 20 mg by mouth daily. 4/30/22   Provider, Historical   potassium chloride (K-DUR, KLOR-CON M20) 20 mEq tablet Take 20 mEq by mouth daily. 5/9/22   Provider, Historical   pregabalin (LYRICA) 100 mg capsule Take 100 mg by mouth two (2) times a day. 5/21/22   Provider, Historical   simethicone (GAS-X) 125 mg capsule Take 1 Capsule by mouth three (3) times daily. Provider, Historical   spironolactone (ALDACTONE) 25 mg tablet Take 25 mg by mouth daily. 5/20/22   Provider, Historical   tamsulosin (FLOMAX) 0.4 mg capsule Take 0.4 mg by mouth daily. 5/20/22   Provider, Historical        Allergies   Allergen Reactions    Lisinopril Unknown (comments)       Review of Systems:   Omitted as patient is unable to cooperate. Non-communicative. Objective:     Vitals:    06/04/22 0145 06/04/22 0215 06/04/22 0311   BP: (!) 144/91  (!) 145/89   Pulse: (!) 130  (!) 119   Resp: (!) 38  18   Temp: 98.9 °F (37.2 °C)     SpO2: 96% 97% 97%   Weight: 83.9 kg (185 lb)     Height: 5' 10\" (1.778 m)          Physical Exam:   General: alert, no distress  Eye: conjunctivae/corneas clear. PERRL, EOM's intact. Throat and Neck: normal and no erythema or exudates noted. No mass   Lung: clear to auscultation bilaterally  Heart: regular rate and rhythm, tachycardia  Abdomen: firm, distended and generalized tenderness. Bowel sounds hypoactive. No masses,  Extremities:  able to move all extremities normal, atraumatic  Skin: Normal.  Neurologic: Motor function and sensation grossly intact.   Psychiatric: non focal    Recent Results (from the past 24 hour(s))   CBC WITH AUTOMATED DIFF Collection Time: 06/04/22  1:57 AM   Result Value Ref Range    WBC 9.5 4.1 - 11.1 K/uL    RBC 4.33 4.10 - 5.70 M/uL    HGB 11.8 (L) 12.1 - 17.0 g/dL    HCT 36.6 36.6 - 50.3 %    MCV 84.5 80.0 - 99.0 FL    MCH 27.3 26.0 - 34.0 PG    MCHC 32.2 30.0 - 36.5 g/dL    RDW 15.0 (H) 11.5 - 14.5 %    PLATELET 650 891 - 809 K/uL    MPV 11.7 8.9 - 12.9 FL    NRBC 0.0 0.0  WBC    ABSOLUTE NRBC 0.00 0.00 - 0.01 K/uL    NEUTROPHILS 72 32 - 75 %    LYMPHOCYTES 18 12 - 49 %    MONOCYTES 9 5 - 13 %    EOSINOPHILS 0 0 - 7 %    BASOPHILS 0 0 - 1 %    IMMATURE GRANULOCYTES 1 (H) 0 - 0.5 %    ABS. NEUTROPHILS 6.9 1.8 - 8.0 K/UL    ABS. LYMPHOCYTES 1.7 0.8 - 3.5 K/UL    ABS. MONOCYTES 0.9 0.0 - 1.0 K/UL    ABS. EOSINOPHILS 0.0 0.0 - 0.4 K/UL    ABS. BASOPHILS 0.0 0.0 - 0.1 K/UL    ABS. IMM. GRANS. 0.1 (H) 0.00 - 0.04 K/UL    DF AUTOMATED     METABOLIC PANEL, COMPREHENSIVE    Collection Time: 06/04/22  1:57 AM   Result Value Ref Range    Sodium 134 (L) 136 - 145 mmol/L    Potassium 3.6 3.5 - 5.1 mmol/L    Chloride 103 97 - 108 mmol/L    CO2 21 21 - 32 mmol/L    Anion gap 10 5 - 15 mmol/L    Glucose 305 (H) 65 - 100 mg/dL    BUN 30 (H) 6 - 20 mg/dL    Creatinine 2.02 (H) 0.70 - 1.30 mg/dL    BUN/Creatinine ratio 15 12 - 20      GFR est AA 39 (L) >60 ml/min/1.73m2    GFR est non-AA 33 (L) >60 ml/min/1.73m2    Calcium 9.8 8.5 - 10.1 mg/dL    Bilirubin, total 0.5 0.2 - 1.0 mg/dL    AST (SGOT) 9 (L) 15 - 37 U/L    ALT (SGPT) 12 12 - 78 U/L    Alk. phosphatase 166 (H) 45 - 117 U/L    Protein, total 10.3 (H) 6.4 - 8.2 g/dL    Albumin 3.6 3.5 - 5.0 g/dL    Globulin 6.7 (H) 2.0 - 4.0 g/dL    A-G Ratio 0.5 (L) 1.1 - 2.2     LACTIC ACID    Collection Time: 06/04/22  1:57 AM   Result Value Ref Range    Lactic acid 3.0 (HH) 0.4 - 2.0 mmol/L   AMMONIA    Collection Time: 06/04/22  1:57 AM   Result Value Ref Range    Ammonia, plasma 27 <32 umol/L       XR Results (maximum last 3):   Results from [place] encounter on 06/04/22    XR ABD ACUTE W 1 V CHEST    Narrative  XR ABD ACUTE W 1 V CHEST    Comparison: Noncontrasted CT scan of the abdomen and pelvis obtained on the same  date. Single view chest: The lungs are underinflated with bibasilar subsegmental  atelectasis. No pneumothorax or pleural effusion apparent. The heart size is  magnified by AP technique. There is no evidence of acute cardiac decompensation. 2 view abdomen: Persistent distention of small and large bowel. Enteric tube has  been inserted with tip projecting over the stomach. Inferior vena cava filter  noted. Negative for pneumoperitoneum. Impression  1. Enteric tube insertion. 2. Persistent distention of small and large bowel. 3. Mild bibasilar atelectasis. CT Results (maximum last 3): Results from [place] encounter on 06/04/22    CT ABD PELV WO CONT    Narrative  CT ABD PELV WO CONT    Technique: Noncontrasted CT scan of the abdomen and pelvis was performed  utilizing transaxial images obtained from the dome of the diaphragm to the pubic  symphysis. Coronal and sagittal reconstructions were generated. Dose Reduction:  All CT scans at this facility are performed using dose reduction optimization  techniques as appropriate to a performed exam including the following: Automated  exposure control, adjustments of the mA and/or kV according to patient size, or  use of iterative reconstruction technique. Comparison:  None available    Findings: There is mild bibasilar subsegmental atelectasis. Small calcified  splenic granulomata are noted. The liver, pancreas, adrenal glands, and right  kidney are unremarkable for noncontrasted technique. Left lower pole parapelvic  cyst versus calyceal dilatation containing a 4 mm calculus. The gallbladder is  unremarkable. No biliary duct dilatation apparent. The abdominal aorta is normal  in caliber. Inferior vena cava filter noted.     The colon is distended to the level of the mid sigmoid colon with beaking and  swirling mesenteric configuration consistent with sigmoid volvulus. There is  mild small bowel dilatation with distal ileum decompressed and caliber change at  the site of mesenteric swirling. No free fluid identified. Negative for pneumoperitoneum. Urinary bladder is  unremarkable. The prostate gland is large measuring 5.5 cm in transverse  diameter. Seminal vesicles are unremarkable. The appendix is nonvisualized. There is advanced osteoarthritis of the right hip. No suspicious lytic or  blastic lesion apparent. Impression  Sigmoid volvulus. MRI Results (maximum last 3): No results found for this or any previous visit. Nuclear Medicine Results (maximum last 3): No results found for this or any previous visit. US Results (maximum last 3): No results found for this or any previous visit. Assessment and plan:   # Sigmoid volvulus  - Keep NPO  - Consult surgery  - IVF infusion    # Lactic acidosis  - Likely secondary to nausea from above  - Fluid resuscitation and recheck in AM     # Nausea, vomiting  - Zofran PRN    # Deaf  - Non-communicative. Currently calm  - Haldol PRN     # Diabetes  - Continue home dose lantus  - POC + correctional insulin q6h    # CKD, stage II  - monitor for now. # Urinary retention.  - Monitor urine output.  - Consider gabriel cath if needed. # Full code by default, need further clarification    # Medication list reviewed on Epic and/or outside documentation. Not reviewed with patient.         Signed By: Malik Ramos MD     June 4, 2022

## 2022-06-04 NOTE — ED PROVIDER NOTES
[id number]  EMERGENCY DEPARTMENT ENCOUNTER NOTE    Date: 6/4/2022  Patient Name: Ko Arellano      History of Presenting Illness     Chief Complaint   Patient presents with    Nausea    Abdominal Pain       History Provided By: EMS    HPI: Ko Arellano, 66 y.o. male with PMH of DM, HTN, HLD, CVA with aphasia and deafness, BPH and CKD presenting to the emergency department from SNF with abdominal distention and vomiting. Coming from [address] [place] [address]. EMS were unable to give me a timeline on the onset of symptoms. No known history of liver disease. I was not able to obtain additional information due to patient's status, deafness and aphasia. There are no other complaints, changes, or physical findings at this time. PCP: None    Current Facility-Administered Medications   Medication Dose Route Frequency Provider Last Rate Last Admin    morphine injection 4 mg  4 mg IntraVENous ONCE Jesus Lambert MD         Current Outpatient Medications   Medication Sig Dispense Refill    atorvastatin (LIPITOR) 40 mg tablet Take 40 mg by mouth daily.  bethanechol (URECHOLINE) 25 mg tablet Take 25 mg by mouth three (3) times daily.  dilTIAZem ER (DILACOR XR) 180 mg capsule Take 180 mg by mouth daily.  finasteride (PROSCAR) 5 mg tablet Take 5 mg by mouth daily.  furosemide (LASIX) 40 mg tablet Take 40 mg by mouth daily.  Levemir U-100 Insulin 100 unit/mL injection 10 Units by SubCUTAneous route daily.  linaGLIPtin (Tradjenta) 5 mg tablet Take 5 mg by mouth daily.  metFORMIN (GLUCOPHAGE) 500 mg tablet Take 500 mg by mouth two (2) times a day.  metoprolol succinate (TOPROL-XL) 50 mg XL tablet Take 20 mg by mouth daily.  oxybutynin (DITROPAN) 5 mg tablet Take 5 mg by mouth daily.  pantoprazole (PROTONIX) 20 mg tablet Take 20 mg by mouth daily.       potassium chloride (K-DUR, KLOR-CON M20) 20 mEq tablet Take 20 mEq by mouth daily.      pregabalin (LYRICA) 100 mg capsule Take 100 mg by mouth two (2) times a day.  simethicone (GAS-X) 125 mg capsule Take 1 Capsule by mouth three (3) times daily.  spironolactone (ALDACTONE) 25 mg tablet Take 25 mg by mouth daily.  tamsulosin (FLOMAX) 0.4 mg capsule Take 0.4 mg by mouth daily. Past History     Past Medical History:  Past Medical History:   Diagnosis Date    Anemia     BPH (benign prostatic hyperplasia)     Chronic kidney disease     Diabetes ([facility] [address]     Hypercholesteremia     Hyperlipidemia     Neurological disorder     Nonspeaking deaf        Past Surgical History:  History reviewed. No pertinent surgical history. Family History:  History reviewed. No pertinent family history. Social History:  Social History     Tobacco Use    Smoking status: Not on file    Smokeless tobacco: Not on file   Substance Use Topics    Alcohol use: Not Currently    Drug use: Not on file       Allergies: Allergies   Allergen Reactions    Lisinopril Unknown (comments)         Review of Systems     Review of Systems   Unable to perform ROS: Other   Per HPI    Physical Exam     Physical Exam  Vitals and nursing note reviewed. Constitutional:       General: He is in acute distress. Appearance: He is ill-appearing. HENT:      Head: Normocephalic and atraumatic. Eyes:      Extraocular Movements: Extraocular movements intact. Conjunctiva/sclera: Conjunctivae normal.   Cardiovascular:      Rate and Rhythm: Regular rhythm. Tachycardia present. Pulmonary:      Effort: Respiratory distress present. Breath sounds: Normal breath sounds. Abdominal:      General: Bowel sounds are increased. There is distension. Tenderness: There is generalized abdominal tenderness. Musculoskeletal:         General: No deformity or signs of injury. Neurological:      Mental Status: He is alert. Mental status is at baseline.          Lab and Diagnostic Study Results Labs -     Recent Results (from the past 12 hour(s))   CBC WITH AUTOMATED DIFF    Collection Time: 06/04/22  1:57 AM   Result Value Ref Range    WBC 9.5 4.1 - 11.1 K/uL    RBC 4.33 4.10 - 5.70 M/uL    HGB 11.8 (L) 12.1 - 17.0 g/dL    HCT 36.6 36.6 - 50.3 %    MCV 84.5 80.0 - 99.0 FL    MCH 27.3 26.0 - 34.0 PG    MCHC 32.2 30.0 - 36.5 g/dL    RDW 15.0 (H) 11.5 - 14.5 %    PLATELET 044 488 - 162 K/uL    MPV 11.7 8.9 - 12.9 FL    NRBC 0.0 0.0  WBC    ABSOLUTE NRBC 0.00 0.00 - 0.01 K/uL    NEUTROPHILS 72 32 - 75 %    LYMPHOCYTES 18 12 - 49 %    MONOCYTES 9 5 - 13 %    EOSINOPHILS 0 0 - 7 %    BASOPHILS 0 0 - 1 %    IMMATURE GRANULOCYTES 1 (H) 0 - 0.5 %    ABS. NEUTROPHILS 6.9 1.8 - 8.0 K/UL    ABS. LYMPHOCYTES 1.7 0.8 - 3.5 K/UL    ABS. MONOCYTES 0.9 0.0 - 1.0 K/UL    ABS. EOSINOPHILS 0.0 0.0 - 0.4 K/UL    ABS. BASOPHILS 0.0 0.0 - 0.1 K/UL    ABS. IMM. GRANS. 0.1 (H) 0.00 - 0.04 K/UL    DF AUTOMATED     METABOLIC PANEL, COMPREHENSIVE    Collection Time: 06/04/22  1:57 AM   Result Value Ref Range    Sodium 134 (L) 136 - 145 mmol/L    Potassium 3.6 3.5 - 5.1 mmol/L    Chloride 103 97 - 108 mmol/L    CO2 21 21 - 32 mmol/L    Anion gap 10 5 - 15 mmol/L    Glucose 305 (H) 65 - 100 mg/dL    BUN 30 (H) 6 - 20 mg/dL    Creatinine 2.02 (H) 0.70 - 1.30 mg/dL    BUN/Creatinine ratio 15 12 - 20      GFR est AA 39 (L) >60 ml/min/1.73m2    GFR est non-AA 33 (L) >60 ml/min/1.73m2    Calcium 9.8 8.5 - 10.1 mg/dL    Bilirubin, total 0.5 0.2 - 1.0 mg/dL    AST (SGOT) 9 (L) 15 - 37 U/L    ALT (SGPT) 12 12 - 78 U/L    Alk.  phosphatase 166 (H) 45 - 117 U/L    Protein, total 10.3 (H) 6.4 - 8.2 g/dL    Albumin 3.6 3.5 - 5.0 g/dL    Globulin 6.7 (H) 2.0 - 4.0 g/dL    A-G Ratio 0.5 (L) 1.1 - 2.2     LACTIC ACID    Collection Time: 06/04/22  1:57 AM   Result Value Ref Range    Lactic acid 3.0 (HH) 0.4 - 2.0 mmol/L   AMMONIA    Collection Time: 06/04/22  1:57 AM   Result Value Ref Range    Ammonia, plasma 27 <32 umol/L       Radiologic Studies -   [unfilled]  CT Results  (Last 48 hours)               06/04/22 0226  CT ABD PELV WO CONT Final result    Impression:  Sigmoid volvulus. Narrative:  CT ABD PELV WO CONT       Technique: Noncontrasted CT scan of the abdomen and pelvis was performed   utilizing transaxial images obtained from the dome of the diaphragm to the pubic   symphysis. Coronal and sagittal reconstructions were generated. Dose Reduction:    All CT scans at this facility are performed using dose reduction optimization   techniques as appropriate to a performed exam including the following: Automated   exposure control, adjustments of the mA and/or kV according to patient size, or   use of iterative reconstruction technique. Comparison:  None available       Findings: There is mild bibasilar subsegmental atelectasis. Small calcified   splenic granulomata are noted. The liver, pancreas, adrenal glands, and right   kidney are unremarkable for noncontrasted technique. Left lower pole parapelvic   cyst versus calyceal dilatation containing a 4 mm calculus. The gallbladder is   unremarkable. No biliary duct dilatation apparent. The abdominal aorta is normal   in caliber. Inferior vena cava filter noted. The colon is distended to the level of the mid sigmoid colon with beaking and   swirling mesenteric configuration consistent with sigmoid volvulus. There is   mild small bowel dilatation with distal ileum decompressed and caliber change at   the site of mesenteric swirling. No free fluid identified. Negative for pneumoperitoneum. Urinary bladder is   unremarkable. The prostate gland is large measuring 5.5 cm in transverse   diameter. Seminal vesicles are unremarkable. The appendix is nonvisualized. There is advanced osteoarthritis of the right hip. No suspicious lytic or   blastic lesion apparent.                CXR Results  (Last 48 hours)               06/04/22 0316  XR ABD ACUTE W 1 V CHEST Final result    Impression:  1. Enteric tube insertion. 2. Persistent distention of small and large bowel. 3. Mild bibasilar atelectasis. Narrative:  XR ABD ACUTE W 1 V CHEST        Comparison: Noncontrasted CT scan of the abdomen and pelvis obtained on the same   date. Single view chest: The lungs are underinflated with bibasilar subsegmental   atelectasis. No pneumothorax or pleural effusion apparent. The heart size is   magnified by AP technique. There is no evidence of acute cardiac decompensation. 2 view abdomen: Persistent distention of small and large bowel. Enteric tube has   been inserted with tip projecting over the stomach. Inferior vena cava filter   noted. Negative for pneumoperitoneum. Medical Decision Making and ED Course   - I am the first and primary provider for this patient AND AM THE PRIMARY PROVIDER OF RECORD. - I reviewed the vital signs, available nursing notes, past medical history, past surgical history, family history and social history. - Initial assessment performed. The patients presenting problems have been discussed, and the staff are in agreement with the care plan formulated and outlined with them. I have encouraged them to ask questions as they arise throughout their visit. Vital Signs-Reviewed the patient's vital signs. Patient Vitals for the past 24 hrs:   Temp Pulse Resp BP SpO2   06/04/22 0311 -- (!) 119 18 (!) 145/89 97 %   06/04/22 0215 -- -- -- -- 97 %   06/04/22 0145 98.9 °F (37.2 °C) (!) 130 (!) 38 (!) 144/91 96 %       Records Reviewed: Nursing Notes    Provider Notes (Medical Decision Making):     Patient presenting to the emerge department with abdominal nausea and vomiting. Progression of symptoms is unclear as I was unable to obtain adequate HPI. Point-of-care ultrasound was obtained immediately upon arrival to the patient's to the emergency department which showed symptoms consistent with SBO.   Consequently, patient was taken to the CT scan in which none contrast CT was performed showing evidence of sigmoid volvulus. NG tube was placed with improvement of patient's hemodynamics. Initially, due to his abdominal distention he was very tachypneic with respiratory rate in the upper 30s and heart rate is in the 130s to 140s. Upon placement of NG tube symptoms has improved and patient appears more comfortable. Symptomatic treatment with Zofran and morphine. Patient was discussed with the surgeon on-call who will come to evaluate the patient. Given multiple comorbidities requiring medical care, patient will be admitted to a medical setting with surgical consultation. Disposition     Disposition: Condition stable  Admitted to Floor Medical Floor the case was discussed with the admitting physician Dr. Jennfier Pantoja    Admitted    Diagnosis     Clinical Impression:   1. Sigmoid volvulus ([address]        Attestations: Delphine Habermann, MD    Please note that this dictation was completed with [employer], the computer voice recognition software. Quite often unanticipated grammatical, syntax, homophones, and other interpretive errors are inadvertently transcribed by the computer software. Please disregard these errors. Please excuse any errors that have escaped final proofreading. Thank you.

## 2022-06-05 ENCOUNTER — APPOINTMENT (OUTPATIENT)
Dept: GENERAL RADIOLOGY | Age: 72
DRG: 330 | End: 2022-06-05
Attending: SURGERY
Payer: MEDICARE

## 2022-06-05 ENCOUNTER — APPOINTMENT (OUTPATIENT)
Dept: GENERAL RADIOLOGY | Age: 72
DRG: 330 | End: 2022-06-05
Attending: INTERNAL MEDICINE
Payer: MEDICARE

## 2022-06-05 LAB
ANION GAP SERPL CALC-SCNC: 8 MMOL/L (ref 5–15)
BASOPHILS # BLD: 0 K/UL (ref 0–0.1)
BASOPHILS NFR BLD: 0 % (ref 0–1)
BUN SERPL-MCNC: 17 MG/DL (ref 6–20)
BUN/CREAT SERPL: 15 (ref 12–20)
CA-I BLD-MCNC: 8.6 MG/DL (ref 8.5–10.1)
CHLORIDE SERPL-SCNC: 111 MMOL/L (ref 97–108)
CO2 SERPL-SCNC: 22 MMOL/L (ref 21–32)
CREAT SERPL-MCNC: 1.12 MG/DL (ref 0.7–1.3)
DIFFERENTIAL METHOD BLD: ABNORMAL
EOSINOPHIL # BLD: 0.3 K/UL (ref 0–0.4)
EOSINOPHIL NFR BLD: 4 % (ref 0–7)
ERYTHROCYTE [DISTWIDTH] IN BLOOD BY AUTOMATED COUNT: 15.1 % (ref 11.5–14.5)
GLUCOSE BLD STRIP.AUTO-MCNC: 102 MG/DL (ref 65–117)
GLUCOSE BLD STRIP.AUTO-MCNC: 110 MG/DL (ref 65–117)
GLUCOSE BLD STRIP.AUTO-MCNC: 111 MG/DL (ref 65–117)
GLUCOSE BLD STRIP.AUTO-MCNC: 96 MG/DL (ref 65–117)
GLUCOSE SERPL-MCNC: 113 MG/DL (ref 65–100)
HCT VFR BLD AUTO: 30.4 % (ref 36.6–50.3)
HGB BLD-MCNC: 9.7 G/DL (ref 12.1–17)
IMM GRANULOCYTES # BLD AUTO: 0.1 K/UL (ref 0–0.04)
IMM GRANULOCYTES NFR BLD AUTO: 1 % (ref 0–0.5)
LYMPHOCYTES # BLD: 1.4 K/UL (ref 0.8–3.5)
LYMPHOCYTES NFR BLD: 15 % (ref 12–49)
MCH RBC QN AUTO: 27.5 PG (ref 26–34)
MCHC RBC AUTO-ENTMCNC: 31.9 G/DL (ref 30–36.5)
MCV RBC AUTO: 86.1 FL (ref 80–99)
MONOCYTES # BLD: 0.7 K/UL (ref 0–1)
MONOCYTES NFR BLD: 8 % (ref 5–13)
NEUTS SEG # BLD: 6.9 K/UL (ref 1.8–8)
NEUTS SEG NFR BLD: 72 % (ref 32–75)
NRBC # BLD: 0 K/UL (ref 0–0.01)
NRBC BLD-RTO: 0 PER 100 WBC
PERFORMED BY, TECHID: NORMAL
PLATELET # BLD AUTO: 243 K/UL (ref 150–400)
PMV BLD AUTO: 11.4 FL (ref 8.9–12.9)
POTASSIUM SERPL-SCNC: 3 MMOL/L (ref 3.5–5.1)
RBC # BLD AUTO: 3.53 M/UL (ref 4.1–5.7)
SODIUM SERPL-SCNC: 141 MMOL/L (ref 136–145)
WBC # BLD AUTO: 9.4 K/UL (ref 4.1–11.1)

## 2022-06-05 PROCEDURE — 65270000029 HC RM PRIVATE

## 2022-06-05 PROCEDURE — 74011250636 HC RX REV CODE- 250/636: Performed by: INTERNAL MEDICINE

## 2022-06-05 PROCEDURE — 71045 X-RAY EXAM CHEST 1 VIEW: CPT

## 2022-06-05 PROCEDURE — 99232 SBSQ HOSP IP/OBS MODERATE 35: CPT | Performed by: SURGERY

## 2022-06-05 PROCEDURE — 74011000250 HC RX REV CODE- 250: Performed by: INTERNAL MEDICINE

## 2022-06-05 PROCEDURE — 80048 BASIC METABOLIC PNL TOTAL CA: CPT

## 2022-06-05 PROCEDURE — 85025 COMPLETE CBC W/AUTO DIFF WBC: CPT

## 2022-06-05 PROCEDURE — 74011636637 HC RX REV CODE- 636/637: Performed by: INTERNAL MEDICINE

## 2022-06-05 PROCEDURE — 36415 COLL VENOUS BLD VENIPUNCTURE: CPT

## 2022-06-05 PROCEDURE — 74018 RADEX ABDOMEN 1 VIEW: CPT

## 2022-06-05 RX ADMIN — SODIUM CHLORIDE, PRESERVATIVE FREE 10 ML: 5 INJECTION INTRAVENOUS at 06:40

## 2022-06-05 RX ADMIN — CEFTRIAXONE 1 G: 1 INJECTION, POWDER, FOR SOLUTION INTRAMUSCULAR; INTRAVENOUS at 08:51

## 2022-06-05 RX ADMIN — SODIUM CHLORIDE, PRESERVATIVE FREE 10 ML: 5 INJECTION INTRAVENOUS at 13:56

## 2022-06-05 RX ADMIN — INSULIN GLARGINE 14 UNITS: 100 INJECTION, SOLUTION SUBCUTANEOUS at 08:52

## 2022-06-05 RX ADMIN — ENOXAPARIN SODIUM 40 MG: 100 INJECTION SUBCUTANEOUS at 08:52

## 2022-06-05 RX ADMIN — SODIUM CHLORIDE, PRESERVATIVE FREE 10 ML: 5 INJECTION INTRAVENOUS at 21:02

## 2022-06-05 NOTE — PROGRESS NOTES
Call place to MD regarding  order to continued  pt soft wrist restraint. Spoked to Dr Shelley Castro order received  to continued soft wrist  restraint  under Dr Clive August.

## 2022-06-05 NOTE — PROGRESS NOTES
PROGRESS NOTE      Chief Complaints:  Patient examined this morning. HPI and  Objective:    Patient was comfortable. Patient is having bowel movement. Patient is a poor historian. Chart reviewed. Patient has a heart rate in 150s blood pressure stable. Abdomen examination discussed below. Review of Systems: Unable to assess  EXAM:  Visit Vitals  BP (!) 150/77 (BP 1 Location: Right upper arm, BP Patient Position: Semi fowlers)   Pulse (!) 115   Temp 98.7 °F (37.1 °C)   Resp 20   Ht 5' 10\" (1.778 m)   Wt 185 lb (83.9 kg)   SpO2 93%   BMI 26.54 kg/m²       Patient is awake   Head and neck atraumatic, normocephalic. ENT: No hoarse voice  Cardiac system regular rate rhythm. Pulmonary no audible wheeze  Chest wall excursion normal with respiration cycle  Abdomen is soft not particularly distended. Neurologically nonfocal.  Skin is warm and moist.  Psychosocial: Cooperative. Vascular examination as previously noted no changes. Recent Results (from the past 24 hour(s))   GLUCOSE, POC    Collection Time: 06/04/22 11:18 AM   Result Value Ref Range    Glucose (POC) 166 (H) 65 - 117 mg/dL    Performed by [address], POC    Collection Time: 06/04/22  6:31 PM   Result Value Ref Range    Glucose (POC) 144 (H) 65 - 117 mg/dL    Performed by Ehsan Elizalde RN (Traveler)    GLUCOSE, POC    Collection Time: 06/04/22 11:56 PM   Result Value Ref Range    Glucose (POC) 102 65 - 117 mg/dL    Performed by [address] [place] [address], POC    Collection Time: 06/05/22  6:39 AM   Result Value Ref Range    Glucose (POC) 110 65 - 117 mg/dL    Performed by Ai Duffy        ASSESSMENT:   Patient is 66 y.o. with diagnosis of : Active Problems:    Sigmoid volvulus ([address] (6/4/2022)        PLAN:                 Abdomen seems softer and less distended. Patient is having bowel. We will follow-up with KUB this morning. Check electrolytes. We will continue monitor his progress. We will maintain NG tube for now.   Possibly repeat CT scan tomorrow. Follow-up.

## 2022-06-05 NOTE — PROGRESS NOTES
Problem: Non-Violent Restraints  Goal: Removal from restraints as soon as assessed to be safe  Outcome: Progressing Towards Goal  Goal: No harm/injury to patient while restraints in use  Outcome: Progressing Towards Goal  Goal: Patient's dignity will be maintained  Outcome: Progressing Towards Goal  Goal: Patient Interventions  Outcome: Progressing Towards Goal     Problem: Pressure Injury - Risk of  Goal: *Prevention of pressure injury  Description: Document Jet Scale and appropriate interventions in the flowsheet.   Outcome: Progressing Towards Goal  Note: Pressure Injury Interventions:  Sensory Interventions: Assess changes in LOC    Moisture Interventions: Apply protective barrier, creams and emollients         Mobility Interventions: HOB 30 degrees or less         Friction and Shear Interventions: HOB 30 degrees or less

## 2022-06-05 NOTE — PROGRESS NOTES
Progress Note    Patient: Renee Hanks MRN: [id number]  SSN: [id number]    YOB: 1950  Age: 66 y.o. Sex: male      Admit Date: 6/4/2022    LOS: 1 day     Subjective:   Confused, difficult.   To hearing  No complaint abdominal pain, no nausea   had a liquid bowel movement  Past Medical History:   Diagnosis Date    Anemia     BPH (benign prostatic hyperplasia)     Chronic kidney disease     Diabetes ([facility] [address]     Hypercholesteremia     Hyperlipidemia     Neurological disorder     Nonspeaking deaf         Current Facility-Administered Medications:     cefTRIAXone (ROCEPHIN) 1 g in sterile water (preservative free) 10 mL IV syringe, 1 g, IntraVENous, Q24H, Shine Ridley MD, 1 g at 06/05/22 0851    insulin lispro (HUMALOG) injection, , SubCUTAneous, Q6H, Sarah Prasad MD, 2 Units at 06/04/22 1255    glucose chewable tablet 16 g, 4 Tablet, Oral, PRN, Sarah Prasad MD    glucagon (GLUCAGEN) injection 1 mg, 1 mg, IntraMUSCular, PRN, Sarah Prasad MD    dextrose 10% infusion 0-250 mL, 0-250 mL, IntraVENous, PRN, Sarah Prasad MD    labetaloL (NORMODYNE;TRANDATE) 20 mg/4 mL (5 mg/mL) injection 10 mg, 10 mg, IntraVENous, Q4H PRN, Sarah Prasad MD    sodium chloride (NS) flush 5-40 mL, 5-40 mL, IntraVENous, Q8H, Sarah Prasad MD, 10 mL at 06/05/22 0640    sodium chloride (NS) flush 5-40 mL, 5-40 mL, IntraVENous, PRN, Sarah Prasad MD    acetaminophen (TYLENOL) tablet 650 mg, 650 mg, Oral, Q6H PRN **OR** acetaminophen (TYLENOL) suppository 650 mg, 650 mg, Rectal, Q6H PRN, Sarah Prasad MD    polyethylene glycol (MIRALAX) packet 17 g, 17 g, Oral, DAILY PRN, Sarah Prasad MD    ondansetron (ZOFRAN ODT) tablet 4 mg, 4 mg, Oral, Q8H PRN **OR** ondansetron (ZOFRAN) injection 4 mg, 4 mg, IntraVENous, Q6H PRN, Sarah Prasad MD    enoxaparin (LOVENOX) injection 40 mg, 40 mg, SubCUTAneous, DAILY, Sarah Prasad MD, 40 mg at 06/05/22 0852    haloperidol lactate (HALDOL) injection 2 mg, 2 mg, IntraMUSCular, Q4H PRN, Iggy Prasad MD    insulin glargine (LANTUS) injection 14 Units, 14 Units, SubCUTAneous, DAILY, Iggy Prasad MD, 14 Units at 06/05/22 0852    fentaNYL citrate (PF) injection, , , PRN, Carlita Goodman MD, 50 mcg at 06/04/22 1352    midazolam (PF) (VERSED) 1 mg/mL injection, , , PRN, Carlita Goodman MD, 1 mg at 06/04/22 1354    Objective:     Vitals:    06/04/22 2000 06/05/22 0000 06/05/22 0400 06/05/22 0754   BP:    (!) 148/84   Pulse: (!) 106 (!) 106 (!) 115 (!) 104   Resp:    20   Temp:    97.2 °F (36.2 °C)   SpO2:    99%   Weight:       Height:            Intake and Output:  Current Shift: No intake/output data recorded. Last three shifts: 06/03 1901 - 06/05 0700  In: 530 [I.V.:500]  Out: -     Physical Exam:   Physical Exam  Constitutional:       Appearance: He is ill-appearing. HENT:      Head:      Comments: Ng tube  Eyes:      General: No scleral icterus. Cardiovascular:      Rate and Rhythm: Tachycardia present. Pulmonary:      Breath sounds: Normal breath sounds. Abdominal:      General: There is distension. Palpations: Abdomen is soft. Musculoskeletal:      Cervical back: Neck supple. Skin:     General: Skin is warm.           Lab/Data Review:  Recent Results (from the past 24 hour(s))   GLUCOSE, POC    Collection Time: 06/04/22  6:31 PM   Result Value Ref Range    Glucose (POC) 144 (H) 65 - 117 mg/dL    Performed by Dawna Fisher RN (Traveler)    GLUCOSE, POC    Collection Time: 06/04/22 11:56 PM   Result Value Ref Range    Glucose (POC) 102 65 - 117 mg/dL    Performed by [address] [place] [address], POC    Collection Time: 06/05/22  6:39 AM   Result Value Ref Range    Glucose (POC) 110 65 - 117 mg/dL    Performed by [address] BASIC    Collection Time: 06/05/22  9:21 AM   Result Value Ref Range    Sodium 141 136 - 145 mmol/L    Potassium 3.0 (L) 3.5 - 5.1 mmol/L    Chloride 111 (H) 97 - 108 mmol/L    CO2 22 21 - 32 mmol/L    Anion gap 8 5 - 15 mmol/L    Glucose 113 (H) 65 - 100 mg/dL    BUN 17 6 - 20 mg/dL    Creatinine 1.12 0.70 - 1.30 mg/dL    BUN/Creatinine ratio 15 12 - 20      GFR est AA >60 >60 ml/min/1.73m2    GFR est non-AA >60 >60 ml/min/1.73m2    Calcium 8.6 8.5 - 10.1 mg/dL   CBC WITH AUTOMATED DIFF    Collection Time: 06/05/22  9:21 AM   Result Value Ref Range    WBC 9.4 4.1 - 11.1 K/uL    RBC 3.53 (L) 4.10 - 5.70 M/uL    HGB 9.7 (L) 12.1 - 17.0 g/dL    HCT 30.4 (L) 36.6 - 50.3 %    MCV 86.1 80.0 - 99.0 FL    MCH 27.5 26.0 - 34.0 PG    MCHC 31.9 30.0 - 36.5 g/dL    RDW 15.1 (H) 11.5 - 14.5 %    PLATELET 990 343 - 283 K/uL    MPV 11.4 8.9 - 12.9 FL    NRBC 0.0 0.0  WBC    ABSOLUTE NRBC 0.00 0.00 - 0.01 K/uL    NEUTROPHILS 72 32 - 75 %    LYMPHOCYTES 15 12 - 49 %    MONOCYTES 8 5 - 13 %    EOSINOPHILS 4 0 - 7 %    BASOPHILS 0 0 - 1 %    IMMATURE GRANULOCYTES 1 (H) 0 - 0.5 %    ABS. NEUTROPHILS 6.9 1.8 - 8.0 K/UL    ABS. LYMPHOCYTES 1.4 0.8 - 3.5 K/UL    ABS. MONOCYTES 0.7 0.0 - 1.0 K/UL    ABS. EOSINOPHILS 0.3 0.0 - 0.4 K/UL    ABS. BASOPHILS 0.0 0.0 - 0.1 K/UL    ABS. IMM. GRANS. 0.1 (H) 0.00 - 0.04 K/UL    DF AUTOMATED     GLUCOSE, POC    Collection Time: 06/05/22 12:03 PM   Result Value Ref Range    Glucose (POC) 111 65 - 117 mg/dL    Performed by i[employer] Doll         XR ABD (KUB)   Final Result   Persistent sigmoid volvulus. XR CHEST PORT   Final Result   Diminished bibasilar atelectasis. XR ABD ACUTE W 1 V CHEST   Final Result   1. Enteric tube insertion. 2. Persistent distention of small and large bowel. 3. Mild bibasilar atelectasis. CT ABD PELV WO CONT   Final Result   Sigmoid volvulus.            Assessment:     Active Problems:    Sigmoid volvulus (HCC) (6/4/2022)    Sigmoid volvulus, status post flex sigmoidoscopy decompression  Rectum: tube replacement, patient has some abdominal distention, but soft,  Plan:   Continue current rectal tube suction intermittent,  N.p.o.,  IV hydrations  Electrolytes replacement  Surgeon to follow to see further surgery intubation needed    Signed By: Tisa Aschoff, MD     June 5, 2022        Thank you for allowing me to participate in this patients care  Cc Referring Physician   None

## 2022-06-05 NOTE — PROGRESS NOTES
Problem: Non-Violent Restraints  Goal: Removal from restraints as soon as assessed to be safe  Outcome: Progressing Towards Goal  Goal: No harm/injury to patient while restraints in use  Outcome: Progressing Towards Goal  Goal: Patient's dignity will be maintained  Outcome: Progressing Towards Goal  Goal: Patient Interventions  Outcome: Progressing Towards Goal     Problem: Pressure Injury - Risk of  Goal: *Prevention of pressure injury  Description: Document Jet Scale and appropriate interventions in the flowsheet.   Outcome: Progressing Towards Goal  Note: Pressure Injury Interventions:  Sensory Interventions: Assess changes in LOC    Moisture Interventions: Apply protective barrier, creams and emollients         Mobility Interventions: HOB 30 degrees or less         Friction and Shear Interventions: HOB 30 degrees or less                Problem: Patient Education: Go to Patient Education Activity  Goal: Patient/Family Education  Outcome: Progressing Towards Goal

## 2022-06-05 NOTE — PROGRESS NOTES
Unsuccessful attempt to provide general assess. The pt appears to be asleep with eyes closed and regular, even breaths. He does not respond to quiet voice. CM will revisit at a later time. 2nd attempt at assessment unsuccessful. The pt was awake though speech was unintelligible. Documented Deaf. Call placed to [employer] , as listed ICE conact. Call went to message \"Please enter your access code\"  CM was unable to leave a message.

## 2022-06-05 NOTE — PROGRESS NOTES
Hospitalist Progress Note         Danie Friend MD          Daily Progress Note: 6/5/2022      Subjective: The patient is seen for follow  up.  58-year-old with history of schizophrenia, hyperlipidemia, deaf, non verbal admitted for work-up of abdominal distention. CT abdomen pelvis showed sigmoid volvulus. Underwent sigmoidoscopy with decompression by gastroenterologist June 4th. Abdomen looks less distended and soft. Patient seen in follow-up. No fevers overnight.   KUB this morning shows persistent sigmoid volvulus    Problem List:  Problem List as of 6/5/2022 Never Reviewed          Codes Class Noted - Resolved    Sigmoid volvulus ([facility] ICD-10-CM: K56.2  ICD-9-CM: 560.2  6/4/2022 - Present              Medications reviewed  Current Facility-Administered Medications   Medication Dose Route Frequency    cefTRIAXone (ROCEPHIN) 1 g in sterile water (preservative free) 10 mL IV syringe  1 g IntraVENous Q24H    insulin lispro (HUMALOG) injection   SubCUTAneous Q6H    glucose chewable tablet 16 g  4 Tablet Oral PRN    glucagon (GLUCAGEN) injection 1 mg  1 mg IntraMUSCular PRN    dextrose 10% infusion 0-250 mL  0-250 mL IntraVENous PRN    labetaloL (NORMODYNE;TRANDATE) 20 mg/4 mL (5 mg/mL) injection 10 mg  10 mg IntraVENous Q4H PRN    sodium chloride (NS) flush 5-40 mL  5-40 mL IntraVENous Q8H    sodium chloride (NS) flush 5-40 mL  5-40 mL IntraVENous PRN    acetaminophen (TYLENOL) tablet 650 mg  650 mg Oral Q6H PRN    Or    acetaminophen (TYLENOL) suppository 650 mg  650 mg Rectal Q6H PRN    polyethylene glycol (MIRALAX) packet 17 g  17 g Oral DAILY PRN    ondansetron (ZOFRAN ODT) tablet 4 mg  4 mg Oral Q8H PRN    Or    ondansetron (ZOFRAN) injection 4 mg  4 mg IntraVENous Q6H PRN    enoxaparin (LOVENOX) injection 40 mg  40 mg SubCUTAneous DAILY    haloperidol lactate (HALDOL) injection 2 mg  2 mg IntraMUSCular Q4H PRN    insulin glargine (LANTUS) injection 14 Units  14 Units SubCUTAneous DAILY    fentaNYL citrate (PF) injection    PRN    midazolam (PF) (VERSED) 1 mg/mL injection    PRN       Review of Systems:   Review of systems not obtained due to patient factors. Objective:   Physical Exam:     Visit Vitals  BP (!) 148/84 (BP 1 Location: Left upper arm, BP Patient Position: At rest;Supine)   Pulse (!) 104   Temp 97.2 °F (36.2 °C)   Resp 20   Ht 5' 10\" (1.778 m)   Wt 83.9 kg (185 lb)   SpO2 99%   BMI 26.54 kg/m²      O2 Device: None (Room air)    Temp (24hrs), Av.8 °F (36.6 °C), Min:97.2 °F (36.2 °C), Max:98.7 °F (37.1 °C)    No intake/output data recorded.  1901 -  0700  In: 530 [I.V.:500]  Out: -     General:  Awake but non verbal   Lungs:   Clear to auscultation bilaterally. Chest wall:  No tenderness or deformity. Heart:  Regular rate and rhythm, S1, S2 normal, no murmur, click, rub or gallop. Abdomen:   Soft, non-tender. Bowel sounds normal. No masses,  No organomegaly. Extremities: Extremities normal, atraumatic, no cyanosis or edema. Pulses: 2+ and symmetric all extremities. Skin: Skin color, texture, turgor normal. No rashes or lesions   Neurologic: CNII-XII intact. No gross sensory or motor deficits     Data Review:       Recent Days:  Recent Labs     22  0921 22  0157   WBC 9.4 9.5   HGB 9.7* 11.8*   HCT 30.4* 36.6    352     Recent Labs     22  0157   *   K 3.6      CO2 21   *   BUN 30*   CREA 2.02*   CA 9.8   ALB 3.6   TBILI 0.5   ALT 12     No results for input(s): PH, PCO2, PO2, HCO3, FIO2 in the last 72 hours.     24 Hour Results:  Recent Results (from the past 24 hour(s))   GLUCOSE, POC    Collection Time: 22 11:18 AM   Result Value Ref Range    Glucose (POC) 166 (H) 65 - 117 mg/dL    Performed by [address], POC    Collection Time: 22  6:31 PM   Result Value Ref Range    Glucose (POC) 144 (H) 65 - 117 mg/dL    Performed by Kanu Olmstead RN ([address]) GLUCOSE, POC    Collection Time: 06/04/22 11:56 PM   Result Value Ref Range    Glucose (POC) 102 65 - 117 mg/dL    Performed by [address] [place] [address] Drive, POC    Collection Time: 06/05/22  6:39 AM   Result Value Ref Range    Glucose (POC) 110 65 - 117 mg/dL    Performed by Afshan Murray    CBC WITH AUTOMATED DIFF    Collection Time: 06/05/22  9:21 AM   Result Value Ref Range    WBC 9.4 4.1 - 11.1 K/uL    RBC 3.53 (L) 4.10 - 5.70 M/uL    HGB 9.7 (L) 12.1 - 17.0 g/dL    HCT 30.4 (L) 36.6 - 50.3 %    MCV 86.1 80.0 - 99.0 FL    MCH 27.5 26.0 - 34.0 PG    MCHC 31.9 30.0 - 36.5 g/dL    RDW 15.1 (H) 11.5 - 14.5 %    PLATELET 567 066 - 107 K/uL    MPV 11.4 8.9 - 12.9 FL    NRBC 0.0 0.0  WBC    ABSOLUTE NRBC 0.00 0.00 - 0.01 K/uL    NEUTROPHILS 72 32 - 75 %    LYMPHOCYTES 15 12 - 49 %    MONOCYTES 8 5 - 13 %    EOSINOPHILS 4 0 - 7 %    BASOPHILS 0 0 - 1 %    IMMATURE GRANULOCYTES 1 (H) 0 - 0.5 %    ABS. NEUTROPHILS 6.9 1.8 - 8.0 K/UL    ABS. LYMPHOCYTES 1.4 0.8 - 3.5 K/UL    ABS. MONOCYTES 0.7 0.0 - 1.0 K/UL    ABS. EOSINOPHILS 0.3 0.0 - 0.4 K/UL    ABS. BASOPHILS 0.0 0.0 - 0.1 K/UL    ABS. IMM. GRANS. 0.1 (H) 0.00 - 0.04 K/UL    DF AUTOMATED             Assessment/     Sigmoid volvulus  History of schizophrenia  Hyperlipidemia  Essential hypertension      Plan:  Continue supportive care including NG tube and add IV Rocephin  Monitor repeat electrolytes  Repeat CT abdomen pelvis in a.m. If persistent volvulus, patient may require surgical intervention    Care Plan discussed with: Nurse    Total time spent with patient: 30 minutes.     Helga Mendez MD

## 2022-06-05 NOTE — PROGRESS NOTES
Pt restraints released at noon; pt motioned that he understands that he cannot pull NG tube out. Writer has been checking on him and with every stool clean up, Pt is visiting with family now and has had no attempts at pulling out his ng tube.

## 2022-06-05 NOTE — PROGRESS NOTES
New 16 Fr nasogastric tube place on right nare xray order per protocol to ensure placement, spoked to Didi Bernardo over radiology, who confirm placement.

## 2022-06-06 ENCOUNTER — APPOINTMENT (OUTPATIENT)
Dept: CT IMAGING | Age: 72
DRG: 330 | End: 2022-06-06
Attending: INTERNAL MEDICINE
Payer: MEDICARE

## 2022-06-06 LAB
ANION GAP SERPL CALC-SCNC: 10 MMOL/L (ref 5–15)
BUN SERPL-MCNC: 13 MG/DL (ref 6–20)
BUN/CREAT SERPL: 15 (ref 12–20)
CA-I BLD-MCNC: 8.4 MG/DL (ref 8.5–10.1)
CHLORIDE SERPL-SCNC: 110 MMOL/L (ref 97–108)
CO2 SERPL-SCNC: 24 MMOL/L (ref 21–32)
CREAT SERPL-MCNC: 0.88 MG/DL (ref 0.7–1.3)
GLUCOSE BLD STRIP.AUTO-MCNC: 70 MG/DL (ref 65–117)
GLUCOSE BLD STRIP.AUTO-MCNC: 79 MG/DL (ref 65–117)
GLUCOSE BLD STRIP.AUTO-MCNC: 80 MG/DL (ref 65–117)
GLUCOSE BLD STRIP.AUTO-MCNC: 83 MG/DL (ref 65–117)
GLUCOSE BLD STRIP.AUTO-MCNC: 99 MG/DL (ref 65–117)
GLUCOSE SERPL-MCNC: 87 MG/DL (ref 65–100)
PERFORMED BY, TECHID: NORMAL
POTASSIUM SERPL-SCNC: 2.6 MMOL/L (ref 3.5–5.1)
SODIUM SERPL-SCNC: 144 MMOL/L (ref 136–145)

## 2022-06-06 PROCEDURE — 74011000250 HC RX REV CODE- 250: Performed by: INTERNAL MEDICINE

## 2022-06-06 PROCEDURE — 74176 CT ABD & PELVIS W/O CONTRAST: CPT

## 2022-06-06 PROCEDURE — 74011250636 HC RX REV CODE- 250/636: Performed by: SURGERY

## 2022-06-06 PROCEDURE — 74011250636 HC RX REV CODE- 250/636: Performed by: INTERNAL MEDICINE

## 2022-06-06 PROCEDURE — 65270000029 HC RM PRIVATE

## 2022-06-06 PROCEDURE — 82962 GLUCOSE BLOOD TEST: CPT

## 2022-06-06 PROCEDURE — 80048 BASIC METABOLIC PNL TOTAL CA: CPT

## 2022-06-06 PROCEDURE — 36415 COLL VENOUS BLD VENIPUNCTURE: CPT

## 2022-06-06 PROCEDURE — 74011000636 HC RX REV CODE- 636: Performed by: INTERNAL MEDICINE

## 2022-06-06 RX ORDER — POTASSIUM CHLORIDE 7.45 MG/ML
10 INJECTION INTRAVENOUS ONCE
Status: COMPLETED | OUTPATIENT
Start: 2022-06-06 | End: 2022-06-06

## 2022-06-06 RX ORDER — POTASSIUM CHLORIDE 7.45 MG/ML
10 INJECTION INTRAVENOUS
Status: DISPENSED | OUTPATIENT
Start: 2022-06-06 | End: 2022-06-06

## 2022-06-06 RX ORDER — POTASSIUM CHLORIDE AND SODIUM CHLORIDE 900; 300 MG/100ML; MG/100ML
INJECTION, SOLUTION INTRAVENOUS CONTINUOUS
Status: DISCONTINUED | OUTPATIENT
Start: 2022-06-06 | End: 2022-06-09

## 2022-06-06 RX ADMIN — CEFTRIAXONE 1 G: 1 INJECTION, POWDER, FOR SOLUTION INTRAMUSCULAR; INTRAVENOUS at 10:05

## 2022-06-06 RX ADMIN — DIATRIZOATE MEGLUMINE AND DIATRIZOATE SODIUM 30 ML: 660; 100 LIQUID ORAL; RECTAL at 16:06

## 2022-06-06 RX ADMIN — POTASSIUM CHLORIDE 10 MEQ: 7.46 INJECTION, SOLUTION INTRAVENOUS at 13:39

## 2022-06-06 RX ADMIN — ONDANSETRON HYDROCHLORIDE 4 MG: 2 SOLUTION INTRAMUSCULAR; INTRAVENOUS at 22:51

## 2022-06-06 RX ADMIN — POTASSIUM CHLORIDE 10 MEQ: 7.46 INJECTION, SOLUTION INTRAVENOUS at 11:19

## 2022-06-06 RX ADMIN — POTASSIUM CHLORIDE 10 MEQ: 7.46 INJECTION, SOLUTION INTRAVENOUS at 12:31

## 2022-06-06 RX ADMIN — ENOXAPARIN SODIUM 40 MG: 100 INJECTION SUBCUTANEOUS at 10:08

## 2022-06-06 RX ADMIN — SODIUM CHLORIDE, PRESERVATIVE FREE 10 ML: 5 INJECTION INTRAVENOUS at 14:00

## 2022-06-06 RX ADMIN — POTASSIUM CHLORIDE 10 MEQ: 7.46 INJECTION, SOLUTION INTRAVENOUS at 14:47

## 2022-06-06 RX ADMIN — SODIUM CHLORIDE, PRESERVATIVE FREE 10 ML: 5 INJECTION INTRAVENOUS at 22:52

## 2022-06-06 RX ADMIN — DEXTROSE MONOHYDRATE 125 ML: 100 INJECTION, SOLUTION INTRAVENOUS at 22:00

## 2022-06-06 RX ADMIN — SODIUM CHLORIDE, PRESERVATIVE FREE 10 ML: 5 INJECTION INTRAVENOUS at 05:17

## 2022-06-06 RX ADMIN — POTASSIUM CHLORIDE 10 MEQ: 7.46 INJECTION, SOLUTION INTRAVENOUS at 10:12

## 2022-06-06 RX ADMIN — POTASSIUM CHLORIDE AND SODIUM CHLORIDE: 900; 300 INJECTION, SOLUTION INTRAVENOUS at 10:03

## 2022-06-06 NOTE — PROGRESS NOTES
Problem: Falls - Risk of  Goal: *Absence of Falls  Description: Document Gonzalo Sanchez Fall Risk and appropriate interventions in the flowsheet.   Outcome: Progressing Towards Goal  Note: Fall Risk Interventions:       Mentation Interventions: Adequate sleep, hydration, pain control    Medication Interventions: Bed/chair exit alarm    Elimination Interventions: Bed/chair exit alarm

## 2022-06-06 NOTE — PROGRESS NOTES
Problem: Non-Violent Restraints  Goal: Removal from restraints as soon as assessed to be safe  Outcome: Progressing Towards Goal  Goal: No harm/injury to patient while restraints in use  Outcome: Progressing Towards Goal  Goal: Patient's dignity will be maintained  Outcome: Progressing Towards Goal  Goal: Patient Interventions  Outcome: Progressing Towards Goal     Problem: Pressure Injury - Risk of  Goal: *Prevention of pressure injury  Description: Document Jet Scale and appropriate interventions in the flowsheet. Outcome: Progressing Towards Goal  Note: Pressure Injury Interventions:  Sensory Interventions: Assess changes in LOC    Moisture Interventions: Absorbent underpads,Apply protective barrier, creams and emollients    Activity Interventions: PT/OT evaluation    Mobility Interventions: PT/OT evaluation         Friction and Shear Interventions: HOB 30 degrees or less                Problem: Patient Education: Go to Patient Education Activity  Goal: Patient/Family Education  Outcome: Progressing Towards Goal     Problem: Falls - Risk of  Goal: *Absence of Falls  Description: Document Fabienne Fall Risk and appropriate interventions in the flowsheet.   Outcome: Progressing Towards Goal  Note: Fall Risk Interventions:            Medication Interventions: Bed/chair exit alarm    Elimination Interventions: Call light in reach              Problem: Patient Education: Go to Patient Education Activity  Goal: Patient/Family Education  Outcome: Progressing Towards Goal

## 2022-06-06 NOTE — PROGRESS NOTES
Progress Note    Patient: Jemima Rao MRN: [id number]  SSN: [id number]    YOB: 1950  Age: 66 y.o.   Sex: male      Admit Date: 6/4/2022    LOS: 2 days     Subjective:   Confused, difficult To hearing  No complaint abdominal pain, no nausea     Past Medical History:   Diagnosis Date    Anemia     BPH (benign prostatic hyperplasia)     Chronic kidney disease     [facility] ([facility] [address]     Hypercholesteremia     Hyperlipidemia     Neurological disorder     Nonspeaking deaf         Current Facility-Administered Medications:     potassium chloride 10 mEq in 100 ml IVPB, 10 mEq, IntraVENous, Q1H, Renetta Fraga MD, Last Rate: 100 mL/hr at 06/06/22 1119, 10 mEq at 06/06/22 1119    0.9% sodium chloride with KCl 40 mEq/L infusion, , IntraVENous, CONTINUOUS, Renetta Fraga MD, Last Rate: 75 mL/hr at 06/06/22 1003, New Bag at 06/06/22 1003    cefTRIAXone (ROCEPHIN) 1 g in sterile water (preservative free) 10 mL IV syringe, 1 g, IntraVENous, Q24H, Renetta Fraga MD, 1 g at 06/06/22 1005    insulin lispro (HUMALOG) injection, , SubCUTAneous, Q6H, Marco A Prasad MD, 2 Units at 06/04/22 1255    glucose chewable tablet 16 g, 4 Tablet, Oral, PRN, Harjeet Prasad MD    glucagon (GLUCAGEN) injection 1 mg, 1 mg, IntraMUSCular, PRN, Harjeet Prasad MD    dextrose 10% infusion 0-250 mL, 0-250 mL, IntraVENous, PRN, Harjeet Prasad MD    labetaloL (NORMODYNE;TRANDATE) 20 mg/4 mL (5 mg/mL) injection 10 mg, 10 mg, IntraVENous, Q4H PRN, Harjeet Prasad MD    sodium chloride (NS) flush 5-40 mL, 5-40 mL, IntraVENous, Q8H, Marco A Prasad MD, 10 mL at 06/06/22 0517    sodium chloride (NS) flush 5-40 mL, 5-40 mL, IntraVENous, PRN, Harjeet Prasad MD    acetaminophen (TYLENOL) tablet 650 mg, 650 mg, Oral, Q6H PRN **OR** acetaminophen (TYLENOL) suppository 650 mg, 650 mg, Rectal, Q6H PRN, Harjeet Prasad MD    polyethylene glycol (MIRALAX) packet 17 g, 17 g, Oral, DAILY PRN, Sandy Lima MD    ondansetron (ZOFRAN ODT) tablet 4 mg, 4 mg, Oral, Q8H PRN **OR** ondansetron (ZOFRAN) injection 4 mg, 4 mg, IntraVENous, Q6H PRN, Dragan Prasad MD    enoxaparin (LOVENOX) injection 40 mg, 40 mg, SubCUTAneous, DAILY, Dragan Prasad MD, 40 mg at 06/06/22 1008    haloperidol lactate (HALDOL) injection 2 mg, 2 mg, IntraMUSCular, Q4H PRN, Dragan Prasad MD    insulin glargine (LANTUS) injection 14 Units, 14 Units, SubCUTAneous, DAILY, Dragan Prasad MD, 14 Units at 06/05/22 0852    fentaNYL citrate (PF) injection, , , PRN, Norva Necessary, MD, 50 mcg at 06/04/22 1352    midazolam (PF) (VERSED) 1 mg/mL injection, , , PRN, Norva Necessary, MD, 1 mg at 06/04/22 1354    Objective:     Vitals:    06/05/22 0754 06/05/22 2121 06/06/22 0433 06/06/22 0809   BP: (!) 148/84 (!) 148/80 (!) 165/85 (!) 148/76   Pulse: (!) 104 (!) 101 (!) 101 94   Resp: 20 18 18 20   Temp: 97.2 °F (36.2 °C) 97.8 °F (36.6 °C) 99.1 °F (37.3 °C) 97.4 °F (36.3 °C)   SpO2: 99% 99% 97% 98%   Weight:       Height:            Intake and Output:  Current Shift: No intake/output data recorded. Last three shifts: 06/04 1901 - 06/06 0700  In: 30   Out: 450     Physical Exam:   Physical Exam  Constitutional:       Appearance: He is ill-appearing. HENT:      Head:      Comments: Ng tube  Eyes:      General: No scleral icterus. Cardiovascular:      Rate and Rhythm: Normal rate. Pulmonary:      Breath sounds: Normal breath sounds. Abdominal:      General: There is distension. Palpations: Abdomen is soft. Musculoskeletal:      Cervical back: Neck supple. Skin:     General: Skin is warm.           Lab/Data Review:  Recent Results (from the past 24 hour(s))   GLUCOSE, POC    Collection Time: 06/05/22 12:03 PM   Result Value Ref Range    Glucose (POC) 111 65 - 117 mg/dL    Performed by Claudell Brakeman    GLUCOSE, POC    Collection Time: 06/05/22  6:15 PM   Result Value Ref Range    Glucose (POC) 96 65 - 117 mg/dL    Performed by [address] [place] [address] Collection Time: 06/06/22  1:41 AM   Result Value Ref Range    Glucose (POC) 99 65 - 117 mg/dL    [facility]    GLUCOSE, POC    Collection Time: 06/06/22  5:20 AM   Result Value Ref Range    Glucose (POC) 83 65 - 117 mg/dL    Performed by [address], BASIC    Collection Time: 06/06/22  5:38 AM   Result Value Ref Range    Sodium 144 136 - 145 mmol/L    Potassium 2.6 (LL) 3.5 - 5.1 mmol/L    Chloride 110 (H) 97 - 108 mmol/L    CO2 24 21 - 32 mmol/L    Anion gap 10 5 - 15 mmol/L    Glucose 87 65 - 100 mg/dL    BUN 13 6 - 20 mg/dL    Creatinine 0.88 0.70 - 1.30 mg/dL    BUN/Creatinine ratio 15 12 - 20      GFR est AA >60 >60 ml/min/1.73m2    GFR est non-AA >60 >60 ml/min/1.73m2    Calcium 8.4 (L) 8.5 - 10.1 mg/dL   GLUCOSE, POC    Collection Time: 06/06/22 10:19 AM   Result Value Ref Range    Glucose (POC) 80 65 - 117 mg/dL    Performed by [facility]         XR CHEST PORT   Final Result   Negative exam.      XR ABD (KUB)   Final Result   Persistent sigmoid volvulus. XR CHEST PORT   Final Result   Diminished bibasilar atelectasis. XR ABD ACUTE W 1 V CHEST   Final Result   1. Enteric tube insertion. 2. Persistent distention of small and large bowel. 3. Mild bibasilar atelectasis. CT ABD PELV WO CONT   Final Result   Sigmoid volvulus.       CT ABD PELV WO CONT    (Results Pending)        Assessment:     Active Problems:    Sigmoid volvulus ([address] (6/4/2022)    Sigmoid volvulus, status post flex sigmoidoscopy decompression  Rectum: tube replacement, patient has some abdominal distention, but soft,  Plan:   Continue current rectal tube suction intermittent,  N.p.o.,  IV hydrationsElectrolytes replacement  CT today to reassess, to see if further surgical intervention needed  Surgeon to follow to see further surgery  needed    Signed By: Lex Mendez MD     June 6, 2022        Thank you for allowing me to participate in this patients care  Cc Referring Physician   None

## 2022-06-06 NOTE — PROGRESS NOTES
Hospitalist Progress Note         Rancho Tran MD          Daily Progress Note: 6/6/2022      Subjective: The patient is seen for follow  up.  67-year-old with history of schizophrenia, hyperlipidemia, deaf, non verbal admitted for work-up of abdominal distention. CT abdomen pelvis showed sigmoid volvulus. Underwent sigmoidoscopy with decompression by gastroenterologist June 4th. Abdomen looks less distended and soft. Patient seen in follow-up. No fevers overnight. KUB 06/05 showed persistent sigmoid volvulus.  Abdomen more distended today    Problem List:  Problem List as of 6/6/2022 Never Reviewed          Codes Class Noted - Resolved    Sigmoid volvulus ([facility] [address] ICD-10-CM: K56.2  ICD-9-CM: 560.2  6/4/2022 - Present              Medications reviewed  Current Facility-Administered Medications   Medication Dose Route Frequency    potassium chloride 10 mEq in 100 ml IVPB  10 mEq IntraVENous Q1H    0.9% sodium chloride with KCl 40 mEq/L infusion   IntraVENous CONTINUOUS    cefTRIAXone (ROCEPHIN) 1 g in sterile water (preservative free) 10 mL IV syringe  1 g IntraVENous Q24H    insulin lispro (HUMALOG) injection   SubCUTAneous Q6H    glucose chewable tablet 16 g  4 Tablet Oral PRN    glucagon (GLUCAGEN) injection 1 mg  1 mg IntraMUSCular PRN    dextrose 10% infusion 0-250 mL  0-250 mL IntraVENous PRN    labetaloL (NORMODYNE;TRANDATE) 20 mg/4 mL (5 mg/mL) injection 10 mg  10 mg IntraVENous Q4H PRN    sodium chloride (NS) flush 5-40 mL  5-40 mL IntraVENous Q8H    sodium chloride (NS) flush 5-40 mL  5-40 mL IntraVENous PRN    acetaminophen (TYLENOL) tablet 650 mg  650 mg Oral Q6H PRN    Or    acetaminophen (TYLENOL) suppository 650 mg  650 mg Rectal Q6H PRN    polyethylene glycol (MIRALAX) packet 17 g  17 g Oral DAILY PRN    ondansetron (ZOFRAN ODT) tablet 4 mg  4 mg Oral Q8H PRN    Or    ondansetron (ZOFRAN) injection 4 mg  4 mg IntraVENous Q6H PRN    enoxaparin (LOVENOX) injection 40 mg  40 mg SubCUTAneous DAILY    haloperidol lactate (HALDOL) injection 2 mg  2 mg IntraMUSCular Q4H PRN    insulin glargine (LANTUS) injection 14 Units  14 Units SubCUTAneous DAILY    fentaNYL citrate (PF) injection    PRN    midazolam (PF) (VERSED) 1 mg/mL injection    PRN       Review of Systems:   Review of systems not obtained due to patient factors. Objective:   Physical Exam:     Visit Vitals  BP (!) 148/76 (BP 1 Location: Right upper arm, BP Patient Position: At rest)   Pulse 94   Temp 97.4 °F (36.3 °C)   Resp 20   Ht 5' 10\" (1.778 m)   Wt 83.9 kg (185 lb)   SpO2 98%   BMI 26.54 kg/m²      O2 Device: None (Room air)    Temp (24hrs), Av.1 °F (36.7 °C), Min:97.4 °F (36.3 °C), Max:99.1 °F (37.3 °C)    No intake/output data recorded.  1901 -  0700  In: 30   Out: 450     General:  Awake but non verbal   Lungs:   Clear to auscultation bilaterally. Chest wall:  No tenderness or deformity. Heart:  Regular rate and rhythm, S1, S2 normal, no murmur, click, rub or gallop. Abdomen:   Soft, non-tender. Bowel sounds normal. No masses,  No organomegaly. Extremities: Extremities normal, atraumatic, no cyanosis or edema. Pulses: 2+ and symmetric all extremities. Skin: Skin color, texture, turgor normal. No rashes or lesions   Neurologic: CNII-XII intact. No gross sensory or motor deficits     Data Review:       Recent Days:  Recent Labs     22  0921 22  015   WBC 9.4 9.5   HGB 9.7* 11.8*   HCT 30.4* 36.6    352     Recent Labs     22  0538 22  0921 22  015    141 134*   K 2.6* 3.0* 3.6   * 111* 103   CO2 24 22 21   GLU 87 113* 305*   BUN 13 17 30*   CREA 0.88 1.12 2.02*   CA 8.4* 8.6 9.8   ALB  --   --  3.6   TBILI  --   --  0.5   ALT  --   --  12     No results for input(s): PH, PCO2, PO2, HCO3, FIO2 in the last 72 hours.     24 Hour Results:  Recent Results (from the past 24 hour(s))   GLUCOSE, POC    Collection Time: 06/05/22 12:03 PM   Result Value Ref Range    Glucose (POC) 111 65 - 117 mg/dL    Performed by Krish Cao    GLUCOSE, POC    Collection Time: 06/05/22  6:15 PM   Result Value Ref Range    Glucose (POC) 96 65 - 117 mg/dL    Performed by Sean Esparza    GLUCOSE, POC    Collection Time: 06/06/22  1:41 AM   Result Value Ref Range    Glucose (POC) 99 65 - 117 mg/dL    Performed by Héctor Mock    GLUCOSE, POC    Collection Time: 06/06/22  5:20 AM   Result Value Ref Range    Glucose (POC) 83 65 - 117 mg/dL    Performed by [address], BASIC    Collection Time: 06/06/22  5:38 AM   Result Value Ref Range    Sodium 144 136 - 145 mmol/L    Potassium 2.6 (LL) 3.5 - 5.1 mmol/L    Chloride 110 (H) 97 - 108 mmol/L    CO2 24 21 - 32 mmol/L    Anion gap 10 5 - 15 mmol/L    Glucose 87 65 - 100 mg/dL    BUN 13 6 - 20 mg/dL    Creatinine 0.88 0.70 - 1.30 mg/dL    BUN/Creatinine ratio 15 12 - 20      GFR est AA >60 >60 ml/min/1.73m2    GFR est non-AA >60 >60 ml/min/1.73m2    Calcium 8.4 (L) 8.5 - 10.1 mg/dL           Assessment/     Sigmoid volvulus. Persistentr  History of schizophrenia  Hyperlipidemia  Essential hypertension  Hypokalemia. repleted      Plan:  Continue supportive care including NG tube and add IV Rocephin  Begin NS with 40 meq KCL at 75 cc/hr  Monitor repeat electrolytes  Repeat CT abdomen pelvis today  If persistent volvulus, patient may require surgical intervention  Continue to keep [employer] discussed with: Nurse    Total time spent with patient: 30 minutes.     Oleg Osorio MD

## 2022-06-06 NOTE — PROGRESS NOTES
Reason for Admission: Sigmoid volvulus ([facility] [address]                      RUR Score: 12%                    Plan for utilizing home health:  none        PCP: First and Last name:  None     Name of Practice:    Are you a current patient: Yes/No:    Approximate date of last visit:    Can you participate in a virtual visit with your PCP:                     Current Advanced Directive/Advance Care Plan: Full Code      Healthcare Decision Maker:   Click here to complete [address] including selection of the Healthcare Decision Maker Relationship (ie \"Primary\")             [facility] [phone]    Secondary Decision Maker: Soheila Maverick [facility] - [phone]                  Transition of Care Plan:                    Patient is LTC at [facility] at [facility]. Plan is to return to [facility] once medically stable. Referral and clinicals uploaded into [facility].

## 2022-06-07 ENCOUNTER — APPOINTMENT (OUTPATIENT)
Dept: GENERAL RADIOLOGY | Age: 72
DRG: 330 | End: 2022-06-07
Attending: STUDENT IN AN ORGANIZED HEALTH CARE EDUCATION/TRAINING PROGRAM
Payer: MEDICARE

## 2022-06-07 ENCOUNTER — APPOINTMENT (OUTPATIENT)
Dept: NON INVASIVE DIAGNOSTICS | Age: 72
DRG: 330 | End: 2022-06-07
Attending: STUDENT IN AN ORGANIZED HEALTH CARE EDUCATION/TRAINING PROGRAM
Payer: MEDICARE

## 2022-06-07 ENCOUNTER — ANESTHESIA EVENT (OUTPATIENT)
Dept: SURGERY | Age: 72
DRG: 330 | End: 2022-06-07
Payer: MEDICARE

## 2022-06-07 LAB
ANION GAP SERPL CALC-SCNC: 7 MMOL/L (ref 5–15)
BASOPHILS # BLD: 0 K/UL (ref 0–0.1)
BASOPHILS NFR BLD: 0 % (ref 0–1)
BUN SERPL-MCNC: 12 MG/DL (ref 6–20)
BUN/CREAT SERPL: 13 (ref 12–20)
CA-I BLD-MCNC: 8.7 MG/DL (ref 8.5–10.1)
CHLORIDE SERPL-SCNC: 111 MMOL/L (ref 97–108)
CO2 SERPL-SCNC: 25 MMOL/L (ref 21–32)
CREAT SERPL-MCNC: 0.93 MG/DL (ref 0.7–1.3)
CRP SERPL-MCNC: 8.51 MG/DL (ref 0–0.6)
DIFFERENTIAL METHOD BLD: ABNORMAL
EOSINOPHIL # BLD: 0.2 K/UL (ref 0–0.4)
EOSINOPHIL NFR BLD: 2 % (ref 0–7)
ERYTHROCYTE [DISTWIDTH] IN BLOOD BY AUTOMATED COUNT: 15.1 % (ref 11.5–14.5)
GLUCOSE BLD STRIP.AUTO-MCNC: 84 MG/DL (ref 65–117)
GLUCOSE BLD STRIP.AUTO-MCNC: 88 MG/DL (ref 65–117)
GLUCOSE BLD STRIP.AUTO-MCNC: 90 MG/DL (ref 65–117)
GLUCOSE BLD STRIP.AUTO-MCNC: 93 MG/DL (ref 65–117)
GLUCOSE SERPL-MCNC: 98 MG/DL (ref 65–100)
HCT VFR BLD AUTO: 32.1 % (ref 36.6–50.3)
HGB BLD-MCNC: 10.2 G/DL (ref 12.1–17)
IMM GRANULOCYTES # BLD AUTO: 0 K/UL (ref 0–0.04)
IMM GRANULOCYTES NFR BLD AUTO: 1 % (ref 0–0.5)
LYMPHOCYTES # BLD: 1.4 K/UL (ref 0.8–3.5)
LYMPHOCYTES NFR BLD: 17 % (ref 12–49)
MCH RBC QN AUTO: 27.6 PG (ref 26–34)
MCHC RBC AUTO-ENTMCNC: 31.8 G/DL (ref 30–36.5)
MCV RBC AUTO: 86.8 FL (ref 80–99)
MONOCYTES # BLD: 0.7 K/UL (ref 0–1)
MONOCYTES NFR BLD: 9 % (ref 5–13)
NEUTS SEG # BLD: 5.5 K/UL (ref 1.8–8)
NEUTS SEG NFR BLD: 71 % (ref 32–75)
NRBC # BLD: 0 K/UL (ref 0–0.01)
NRBC BLD-RTO: 0 PER 100 WBC
PERFORMED BY, TECHID: NORMAL
PLATELET # BLD AUTO: 279 K/UL (ref 150–400)
PMV BLD AUTO: 11.3 FL (ref 8.9–12.9)
POTASSIUM SERPL-SCNC: 3 MMOL/L (ref 3.5–5.1)
PROCALCITONIN SERPL-MCNC: 0.06 NG/ML
RBC # BLD AUTO: 3.7 M/UL (ref 4.1–5.7)
SODIUM SERPL-SCNC: 143 MMOL/L (ref 136–145)
WBC # BLD AUTO: 7.8 K/UL (ref 4.1–11.1)

## 2022-06-07 PROCEDURE — 74011250636 HC RX REV CODE- 250/636: Performed by: STUDENT IN AN ORGANIZED HEALTH CARE EDUCATION/TRAINING PROGRAM

## 2022-06-07 PROCEDURE — 36415 COLL VENOUS BLD VENIPUNCTURE: CPT

## 2022-06-07 PROCEDURE — 74011000250 HC RX REV CODE- 250: Performed by: INTERNAL MEDICINE

## 2022-06-07 PROCEDURE — 99232 SBSQ HOSP IP/OBS MODERATE 35: CPT | Performed by: SURGERY

## 2022-06-07 PROCEDURE — 74011250636 HC RX REV CODE- 250/636: Performed by: INTERNAL MEDICINE

## 2022-06-07 PROCEDURE — 93922 UPR/L XTREMITY ART 2 LEVELS: CPT | Performed by: SURGERY

## 2022-06-07 PROCEDURE — 65270000029 HC RM PRIVATE

## 2022-06-07 PROCEDURE — 85025 COMPLETE CBC W/AUTO DIFF WBC: CPT

## 2022-06-07 PROCEDURE — 84145 PROCALCITONIN (PCT): CPT

## 2022-06-07 PROCEDURE — 74011250637 HC RX REV CODE- 250/637: Performed by: INTERNAL MEDICINE

## 2022-06-07 PROCEDURE — 93922 UPR/L XTREMITY ART 2 LEVELS: CPT

## 2022-06-07 PROCEDURE — 71045 X-RAY EXAM CHEST 1 VIEW: CPT

## 2022-06-07 PROCEDURE — 74011000258 HC RX REV CODE- 258: Performed by: STUDENT IN AN ORGANIZED HEALTH CARE EDUCATION/TRAINING PROGRAM

## 2022-06-07 PROCEDURE — 82962 GLUCOSE BLOOD TEST: CPT

## 2022-06-07 PROCEDURE — 80048 BASIC METABOLIC PNL TOTAL CA: CPT

## 2022-06-07 PROCEDURE — 83036 HEMOGLOBIN GLYCOSYLATED A1C: CPT

## 2022-06-07 PROCEDURE — 74011636637 HC RX REV CODE- 636/637: Performed by: INTERNAL MEDICINE

## 2022-06-07 PROCEDURE — 86140 C-REACTIVE PROTEIN: CPT

## 2022-06-07 RX ORDER — POTASSIUM CHLORIDE 7.45 MG/ML
10 INJECTION INTRAVENOUS
Status: COMPLETED | OUTPATIENT
Start: 2022-06-07 | End: 2022-06-07

## 2022-06-07 RX ADMIN — POTASSIUM CHLORIDE 10 MEQ: 7.46 INJECTION, SOLUTION INTRAVENOUS at 15:02

## 2022-06-07 RX ADMIN — POTASSIUM CHLORIDE AND SODIUM CHLORIDE: 900; 300 INJECTION, SOLUTION INTRAVENOUS at 00:56

## 2022-06-07 RX ADMIN — POTASSIUM CHLORIDE 10 MEQ: 7.46 INJECTION, SOLUTION INTRAVENOUS at 17:08

## 2022-06-07 RX ADMIN — PIPERACILLIN AND TAZOBACTAM 4.5 G: 4; .5 INJECTION, POWDER, FOR SOLUTION INTRAVENOUS at 17:08

## 2022-06-07 RX ADMIN — ENOXAPARIN SODIUM 40 MG: 100 INJECTION SUBCUTANEOUS at 09:41

## 2022-06-07 RX ADMIN — WHITE PETROLATUM,ZINC OXIDE: 20; 75 PASTE TOPICAL at 21:00

## 2022-06-07 RX ADMIN — WHITE PETROLATUM,ZINC OXIDE: 20; 75 PASTE TOPICAL at 13:00

## 2022-06-07 RX ADMIN — SODIUM CHLORIDE, PRESERVATIVE FREE 10 ML: 5 INJECTION INTRAVENOUS at 23:04

## 2022-06-07 RX ADMIN — SODIUM CHLORIDE, PRESERVATIVE FREE 10 ML: 5 INJECTION INTRAVENOUS at 06:04

## 2022-06-07 RX ADMIN — PIPERACILLIN AND TAZOBACTAM 3.38 G: 3; .375 INJECTION, POWDER, LYOPHILIZED, FOR SOLUTION INTRAVENOUS at 23:03

## 2022-06-07 RX ADMIN — POTASSIUM CHLORIDE 10 MEQ: 7.46 INJECTION, SOLUTION INTRAVENOUS at 16:04

## 2022-06-07 RX ADMIN — CEFTRIAXONE 1 G: 1 INJECTION, POWDER, FOR SOLUTION INTRAMUSCULAR; INTRAVENOUS at 10:53

## 2022-06-07 RX ADMIN — POTASSIUM CHLORIDE AND SODIUM CHLORIDE: 900; 300 INJECTION, SOLUTION INTRAVENOUS at 18:15

## 2022-06-07 RX ADMIN — INSULIN GLARGINE 14 UNITS: 100 INJECTION, SOLUTION SUBCUTANEOUS at 09:41

## 2022-06-07 NOTE — PROGRESS NOTES
Problem: Non-Violent Restraints  Goal: Removal from restraints as soon as assessed to be safe  Outcome: Progressing Towards Goal  Goal: No harm/injury to patient while restraints in use  Outcome: Progressing Towards Goal  Goal: Patient's dignity will be maintained  Outcome: Progressing Towards Goal  Goal: Patient Interventions  Outcome: Progressing Towards Goal     Problem: Pressure Injury - Risk of  Goal: *Prevention of pressure injury  Description: Document Jet Scale and appropriate interventions in the flowsheet. Outcome: Progressing Towards Goal  Note: Pressure Injury Interventions:  Sensory Interventions: Assess changes in LOC    Moisture Interventions: Absorbent underpads    Activity Interventions: Pressure redistribution bed/mattress(bed type),PT/OT evaluation    Mobility Interventions: PT/OT evaluation    Nutrition Interventions: Discuss nutritional consult with provider    Friction and Shear Interventions: HOB 30 degrees or less                Problem: Patient Education: Go to Patient Education Activity  Goal: Patient/Family Education  Outcome: Progressing Towards Goal     Problem: Falls - Risk of  Goal: *Absence of Falls  Description: Document Fabienne Fall Risk and appropriate interventions in the flowsheet.   Outcome: Progressing Towards Goal  Note: Fall Risk Interventions:       Mentation Interventions: Adequate sleep, hydration, pain control    Medication Interventions: Bed/chair exit alarm    Elimination Interventions: Bed/chair exit alarm              Problem: Patient Education: Go to Patient Education Activity  Goal: Patient/Family Education  Outcome: Progressing Towards Goal

## 2022-06-07 NOTE — PROGRESS NOTES
PROGRESS NOTE      Chief Complaints:  Patient examined this morning. HPI and  Objective:    NG tube output is minimal.  His abdomen is still distended but not as before. CT scan was done and findings reviewed. There is no report on CT scan. Review of Systems:  Unable to assess  EXAM:  Visit Vitals  BP (!) 159/82 (BP 1 Location: Left upper arm, BP Patient Position: At rest)   Pulse 85   Temp 98.1 °F (36.7 °C)   Resp 12   Ht 5' 10\" (1.778 m)   Wt 185 lb (83.9 kg)   SpO2 100%   BMI 26.54 kg/m²       Patient is awake   Head and neck atraumatic, normocephalic. ENT: No hoarse voice  Cardiac system regular rate rhythm. Pulmonary no audible wheeze  Chest wall excursion normal with respiration cycle  Abdomen is soft not particularly distended. Neurologically nonfocal.  Skin is warm and moist.  Psychosocial: Cooperative. Vascular examination as previously noted no changes.     Recent Results (from the past 24 hour(s))   GLUCOSE, POC    Collection Time: 06/06/22  9:42 PM   Result Value Ref Range    Glucose (POC) 70 65 - 117 mg/dL    Performed by [address] [place] [address], POC    Collection Time: 06/07/22 12:49 AM   Result Value Ref Range    Glucose (POC) 84 65 - 117 mg/dL    Performed by Rosemary Hylton, POC    Collection Time: 06/07/22  6:02 AM   Result Value Ref Range    Glucose (POC) 88 65 - 117 mg/dL    Performed by Del Forrest, BASIC    Collection Time: 06/07/22  8:28 AM   Result Value Ref Range    Sodium 143 136 - 145 mmol/L    Potassium 3.0 (L) 3.5 - 5.1 mmol/L    Chloride 111 (H) 97 - 108 mmol/L    CO2 25 21 - 32 mmol/L    Anion gap 7 5 - 15 mmol/L    Glucose 98 65 - 100 mg/dL    BUN 12 6 - 20 mg/dL    Creatinine 0.93 0.70 - 1.30 mg/dL    BUN/Creatinine ratio 13 12 - 20      GFR est AA >60 >60 ml/min/1.73m2    GFR est non-AA >60 >60 ml/min/1.73m2    Calcium 8.7 8.5 - 10.1 mg/dL   CBC WITH AUTOMATED DIFF    Collection Time: 06/07/22  8:28 AM   Result Value Ref Range    WBC 7.8 4.1 - 11.1 K/uL    RBC 3.70 (L) 4.10 - 5.70 M/uL    HGB 10.2 (L) 12.1 - 17.0 g/dL    HCT 32.1 (L) 36.6 - 50.3 %    MCV 86.8 80.0 - 99.0 FL    MCH 27.6 26.0 - 34.0 PG    MCHC 31.8 30.0 - 36.5 g/dL    RDW 15.1 (H) 11.5 - 14.5 %    PLATELET 292 949 - 814 K/uL    MPV 11.3 8.9 - 12.9 FL    NRBC 0.0 0.0  WBC    ABSOLUTE NRBC 0.00 0.00 - 0.01 K/uL    NEUTROPHILS 71 32 - 75 %    LYMPHOCYTES 17 12 - 49 %    MONOCYTES 9 5 - 13 %    EOSINOPHILS 2 0 - 7 %    BASOPHILS 0 0 - 1 %    IMMATURE GRANULOCYTES 1 (H) 0 - 0.5 %    ABS. NEUTROPHILS 5.5 1.8 - 8.0 K/UL    ABS. LYMPHOCYTES 1.4 0.8 - 3.5 K/UL    ABS. MONOCYTES 0.7 0.0 - 1.0 K/UL    ABS. EOSINOPHILS 0.2 0.0 - 0.4 K/UL    ABS. BASOPHILS 0.0 0.0 - 0.1 K/UL    ABS. IMM. GRANS. 0.0 0.00 - 0.04 K/UL    DF AUTOMATED     GLUCOSE, POC    Collection Time: 06/07/22 11:33 AM   Result Value Ref Range    Glucose (POC) 93 65 - 117 mg/dL    Performed by Loly Factor        ASSESSMENT:   Patient is 66 y.o. with diagnosis of : Active Problems:    Sigmoid volvulus ([address] (6/4/2022)        PLAN:                 I reviewed the CT scan this seems to be a still persisting sigmoid volvulus. I will offer family and patient and possible abdominal exploration tomorrow if it does not show any significant improvement.

## 2022-06-07 NOTE — PROGRESS NOTES
Progress Note    Patient: Lucina Huggins MRN: [id number]  SSN: [id number]    YOB: 1950  Age: 66 y.o.   Sex: male      Admit Date: 6/4/2022    LOS: 3 days     Subjective:   Confused, difficult To hearing  No complaint abdominal pain, no nausea   Rectal tube out  Past Medical History:   Diagnosis Date    Anemia     BPH (benign prostatic hyperplasia)     Chronic kidney disease     Diabetes ([address]     Hypercholesteremia     Hyperlipidemia     Neurological disorder     Nonspeaking deaf         Current Facility-Administered Medications:     zinc oxide-white petrolatum 20-75 % topical paste, , Topical, BID, Sena Rees MD    [START ON 6/8/2022] collagenase (SANTYL) 250 unit/gram ointment, , Topical, DAILY, Sena Rees MD    0.9% sodium chloride with KCl 40 mEq/L infusion, , IntraVENous, CONTINUOUS, Lashaun Elder MD, Last Rate: 75 mL/hr at 06/07/22 0056, New Bag at 06/07/22 0056    cefTRIAXone (ROCEPHIN) 1 g in sterile water (preservative free) 10 mL IV syringe, 1 g, IntraVENous, Q24H, Lashaun Elder MD, 1 g at 06/07/22 1053    insulin lispro (HUMALOG) injection, , SubCUTAneous, Q6H, Katharina Prasad MD, 2 Units at 06/04/22 1255    glucose chewable tablet 16 g, 4 Tablet, Oral, PRN, Katharina Prasad MD    glucagon (GLUCAGEN) injection 1 mg, 1 mg, IntraMUSCular, PRN, Katharina Prasad MD    dextrose 10% infusion 0-250 mL, 0-250 mL, IntraVENous, PRN, Alex Tavarez MD, 125 mL at 06/06/22 2200    labetaloL (NORMODYNE;TRANDATE) 20 mg/4 mL (5 mg/mL) injection 10 mg, 10 mg, IntraVENous, Q4H PRN, Katharina Prasad MD    sodium chloride (NS) flush 5-40 mL, 5-40 mL, IntraVENous, Q8H, Marco A Prasad MD, 10 mL at 06/07/22 0604    sodium chloride (NS) flush 5-40 mL, 5-40 mL, IntraVENous, PRN, Katharina Prasad MD    acetaminophen (TYLENOL) tablet 650 mg, 650 mg, Oral, Q6H PRN **OR** acetaminophen (TYLENOL) suppository 650 mg, 650 mg, Rectal, Q6H PRN, Katharina Prasad MD    polyethylene glycol (MIRALAX) packet 17 g, 17 g, Oral, DAILY PRN, Tobin Prasad MD    ondansetron (ZOFRAN ODT) tablet 4 mg, 4 mg, Oral, Q8H PRN **OR** ondansetron (ZOFRAN) injection 4 mg, 4 mg, IntraVENous, Q6H PRN, Tobin Nassar MD, 4 mg at 06/06/22 2251    enoxaparin (LOVENOX) injection 40 mg, 40 mg, SubCUTAneous, DAILY, Tobin Prasad MD, 40 mg at 06/07/22 0941    haloperidol lactate (HALDOL) injection 2 mg, 2 mg, IntraMUSCular, Q4H PRN, Tobin Prasad MD    insulin glargine (LANTUS) injection 14 Units, 14 Units, SubCUTAneous, DAILY, Cheng Moody MD, 14 Units at 06/07/22 0941    fentaNYL citrate (PF) injection, , , PRN, Jay Interiano MD, 50 mcg at 06/04/22 1352    midazolam (PF) (VERSED) 1 mg/mL injection, , , PRN, Jay Interiano MD, 1 mg at 06/04/22 1354    Objective:     Vitals:    06/06/22 1433 06/06/22 1924 06/07/22 0604 06/07/22 0748   BP: (!) 180/82 (!) 177/90 (!) 177/92 (!) 159/82   Pulse: 93 93 86 85   Resp: 19 18 16 12   Temp: 98.2 °F (36.8 °C) 98 °F (36.7 °C) 98.4 °F (36.9 °C) 98.1 °F (36.7 °C)   SpO2: 100% 99% 100% 100%   Weight:       Height:            Intake and Output:  Current Shift: No intake/output data recorded. Last three shifts: 06/05 1901 - 06/07 0700  In: 1271.3 [I.V.:1211.3]  Out: 650     Physical Exam:   Physical Exam  Constitutional:       Appearance: He is ill-appearing. HENT:      Head:      Comments: Ng tube  Eyes:      General: No scleral icterus. Cardiovascular:      Rate and Rhythm: Normal rate. Pulmonary:      Breath sounds: Normal breath sounds. Abdominal:      General: There is distension. Palpations: Abdomen is soft. Musculoskeletal:      Cervical back: Neck supple. Skin:     General: Skin is warm.           Lab/Data Review:  Recent Results (from the past 24 hour(s))   GLUCOSE, POC    Collection Time: 06/06/22  9:42 PM   Result Value Ref Range    Glucose (POC) 70 65 - 117 mg/dL    Performed by [address] [place] [address], POC    Collection Time: 06/07/22 12:49 AM   Result Value Ref Range    Glucose (POC) 84 65 - 117 mg/dL    Performed by Leonel Mendez    GLUCOSE, POC    Collection Time: 06/07/22  6:02 AM   Result Value Ref Range    Glucose (POC) 88 65 - 117 mg/dL    Performed by [address], BASIC    Collection Time: 06/07/22  8:28 AM   Result Value Ref Range    Sodium 143 136 - 145 mmol/L    Potassium 3.0 (L) 3.5 - 5.1 mmol/L    Chloride 111 (H) 97 - 108 mmol/L    CO2 25 21 - 32 mmol/L    Anion gap 7 5 - 15 mmol/L    Glucose 98 65 - 100 mg/dL    BUN 12 6 - 20 mg/dL    Creatinine 0.93 0.70 - 1.30 mg/dL    BUN/Creatinine ratio 13 12 - 20      GFR est AA >60 >60 ml/min/1.73m2    GFR est non-AA >60 >60 ml/min/1.73m2    Calcium 8.7 8.5 - 10.1 mg/dL   CBC WITH AUTOMATED DIFF    Collection Time: 06/07/22  8:28 AM   Result Value Ref Range    WBC 7.8 4.1 - 11.1 K/uL    RBC 3.70 (L) 4.10 - 5.70 M/uL    HGB 10.2 (L) 12.1 - 17.0 g/dL    HCT 32.1 (L) 36.6 - 50.3 %    MCV 86.8 80.0 - 99.0 FL    MCH 27.6 26.0 - 34.0 PG    MCHC 31.8 30.0 - 36.5 g/dL    RDW 15.1 (H) 11.5 - 14.5 %    PLATELET 001 712 - 046 K/uL    MPV 11.3 8.9 - 12.9 FL    NRBC 0.0 0.0  WBC    ABSOLUTE NRBC 0.00 0.00 - 0.01 K/uL    NEUTROPHILS 71 32 - 75 %    LYMPHOCYTES 17 12 - 49 %    MONOCYTES 9 5 - 13 %    EOSINOPHILS 2 0 - 7 %    BASOPHILS 0 0 - 1 %    IMMATURE GRANULOCYTES 1 (H) 0 - 0.5 %    ABS. NEUTROPHILS 5.5 1.8 - 8.0 K/UL    ABS. LYMPHOCYTES 1.4 0.8 - 3.5 K/UL    ABS. MONOCYTES 0.7 0.0 - 1.0 K/UL    ABS. EOSINOPHILS 0.2 0.0 - 0.4 K/UL    ABS. BASOPHILS 0.0 0.0 - 0.1 K/UL    ABS. IMM. GRANS. 0.0 0.00 - 0.04 K/UL    DF AUTOMATED     GLUCOSE, POC    Collection Time: 06/07/22 11:33 AM   Result Value Ref Range    Glucose (POC) 93 65 - 117 mg/dL    Performed by Bethanie Jalloh         XR CHEST PORT   Final Result   Negative exam.      XR ABD (KUB)   Final Result   Persistent sigmoid volvulus. XR CHEST PORT   Final Result   Diminished bibasilar atelectasis. XR ABD ACUTE W 1 V CHEST   Final Result   1. Enteric tube insertion. 2. Persistent distention of small and large bowel. 3. Mild bibasilar atelectasis. CT ABD PELV WO CONT   Final Result   Sigmoid volvulus.       CT ABD PELV WO CONT    (Results Pending)   ANKLE BRACHIAL INDEX    (Results Pending)   XR CHEST PORT    (Results Pending)        Assessment:     Active Problems:    Sigmoid volvulus ([address] (6/4/2022)    Sigmoid volvulus, status post flex sigmoidoscopy decompression  Rectum: tube out    patient has  abdominal distention, but soft,  Plan:   IV hydrationsElectrolytes replacement  Ng suction  Surgeon to follow to see further surgery  needed   I will sign off   Signed By: Prince High MD     June 7, 2022        Thank you for allowing me to participate in this patients care  Cc Referring Physician   None

## 2022-06-07 NOTE — WOUND CARE
IP WOUND CONSULT    [address] RECORD NUMBER:  [id number]  AGE: 66 y.o. GENDER: male  : 1950  TODAY'S DATE:  2022    GENERAL     [] Follow-up   [x] New Consult          PAST MEDICAL HISTORY    Past Medical History:   Diagnosis Date    Anemia     BPH (benign prostatic hyperplasia)     Chronic kidney disease     Diabetes ([address]     Hypercholesteremia     Hyperlipidemia     Neurological disorder     Nonspeaking deaf         PAST SURGICAL HISTORY    History reviewed. No pertinent surgical history. ALLERGIES    Allergies   Allergen Reactions    Lisinopril Unknown (comments)       MEDICATIONS    No current facility-administered medications on file prior to encounter. Current Outpatient Medications on File Prior to Encounter   Medication Sig Dispense Refill    pregabalin (LYRICA) 100 mg capsule Take 100 mg by mouth two (2) times a day.  atorvastatin (LIPITOR) 40 mg tablet Take 40 mg by mouth daily.  bethanechol (URECHOLINE) 25 mg tablet Take 25 mg by mouth three (3) times daily.  dilTIAZem ER (DILACOR XR) 180 mg capsule Take 180 mg by mouth daily.  finasteride (PROSCAR) 5 mg tablet Take 5 mg by mouth daily.  furosemide (LASIX) 40 mg tablet Take 40 mg by mouth daily.  Levemir U-100 Insulin 100 unit/mL injection 14 Units by SubCUTAneous route daily.  linaGLIPtin (Tradjenta) 5 mg tablet Take 5 mg by mouth daily.  metFORMIN (GLUCOPHAGE) 500 mg tablet Take 500 mg by mouth two (2) times a day.  metoprolol succinate (TOPROL-XL) 50 mg XL tablet Take 20 mg by mouth daily.  oxybutynin (DITROPAN) 5 mg tablet Take 5 mg by mouth daily.  pantoprazole (PROTONIX) 20 mg tablet Take 20 mg by mouth daily.  potassium chloride (K-DUR, KLOR-CON M20) 20 mEq tablet Take 20 mEq by mouth daily.  simethicone (GAS-X) 125 mg capsule Take 1 Capsule by mouth three (3) times daily.       spironolactone (ALDACTONE) 25 mg tablet Take 25 mg by mouth daily.      tamsulosin (FLOMAX) 0.4 mg capsule Take 0.4 mg by mouth daily. [unfilled]  Visit Vitals  BP (!) 159/82 (BP 1 Location: Left upper arm, BP Patient Position: At rest)   Pulse 85   Temp 98.1 °F (36.7 °C)   Resp 12   Ht 5' 10\" (1.778 m)   Wt 83.9 kg (185 lb)   SpO2 100%   BMI 26.54 kg/m²       ASSESSMENT   Patiet non-verbal, nodded head yes to consent for pictures of wounds. Moderate assists in repositioning. Incontinent. Incontinence care provided for small soft BM, underpad changed. Jet 11  Blood Glucose: Albumin:  WBCs:    Nutritionist Consulted:   Nutrition Status:    Support Surface:    Contributing Factors: anticoagulation therapy, chronic pressure, decreased mobility, shear force, incontinence of stool and incontinence of urine    Wound Buttocks Medial;Right PI with MASD 06/04/22 (Active)   Wound Image   06/07/22 1105   Wound Etiology Other (Comment) 06/07/22 1105   Dressing Status New drainage noted 06/07/22 1105   Cleansed Cleansed with saline 06/07/22 1105   Dressing/Treatment Zinc paste; Foam 06/07/22 1105   Wound Length (cm) 2 cm 06/07/22 1105   Wound Width (cm) 5 cm 06/07/22 1105   Wound Depth (cm) 0.2 cm 06/07/22 1105   Wound Surface Area (cm^2) 10 cm^2 06/07/22 1105   Wound Volume (cm^3) 2 cm^3 06/07/22 1105   Wound Assessment Pink/red 06/07/22 1105   Drainage Amount Scant 06/07/22 1105   Drainage Description Serosanguinous 06/07/22 1105   Wound Odor None 06/07/22 1105   Kristin-Wound/Incision Assessment Maceration; Non-Blanchable erythema 06/07/22 1105   Edges Defined edges 06/07/22 1105   Wound Thickness Description Partial thickness 06/07/22 1105   Number of days: 3       Wound Heel Left unstageable diabetic heel ulcer 06/04/22 (Active)   Wound Image   06/07/22 1109   Wound Etiology Diabetic 06/07/22 1109   Dressing Status New dressing applied 06/07/22 1109   Cleansed Cleansed with saline 06/07/22 1109   Dressing/Treatment Honey gel/honey paste; Alginate with Ag;ABD pad;Dry dressing 06/07/22 1109   Wound Length (cm) 7 cm 06/07/22 1109   Wound Width (cm) 5.5 cm 06/07/22 1109   Wound Depth (cm) 0.3 cm 06/07/22 1109   Wound Surface Area (cm^2) 38.5 cm^2 06/07/22 1109   Wound Volume (cm^3) 11.55 cm^3 06/07/22 1109   Wound Assessment Slough; Devitalized tissue 06/07/22 1109   Drainage Amount Small 06/07/22 1109   Drainage Description Serosanguinous 06/07/22 1109   Wound Odor Moderate 06/07/22 1109   Kristin-Wound/Incision Assessment Maceration 06/07/22 1109   Edges Defined edges 06/07/22 1109   Number of days: 3       Wound Scrotum (Active)   Wound Image   06/07/22 1107   Wound Etiology Other (Comment) 06/07/22 1107   Dressing Status New dressing applied 06/07/22 1107   Cleansed Cleansed with saline 06/07/22 1107   Dressing/Treatment Zinc paste 06/07/22 1107   Wound Length (cm) 3 cm 06/07/22 1107   Wound Width (cm) 1.2 cm 06/07/22 1107   Wound Depth (cm) 0.1 cm 06/07/22 1107   Wound Surface Area (cm^2) 3.6 cm^2 06/07/22 1107   Wound Volume (cm^3) 0.36 cm^3 06/07/22 1107   Wound Assessment Pink/red 06/07/22 1107   Drainage Amount Scant 06/07/22 1107   Drainage Description Serosanguinous 06/07/22 1107   Wound Odor None 06/07/22 1107   Kristin-Wound/Incision Assessment Intact 06/07/22 1107   Edges Flat/open edges 06/07/22 1107   Number of days: 0          PLAN     Skin Care & Pressure Relief Recommendations  Minimize layers of linen  Pads under patient to optimize support surface  Turn/reposition approximately every 2 hours  Pillow wedges  Manage incontinence   Promote continence; Skin Protective lotion/cream to buttocks and sacrum daily and as needed with incontinence care  Offload heels pillows    Physician/Provider notified:   Recommendations: Patient is non-verbal and incontinent of bowel and bladder. Diabetic wound noted to left heel, wound bed covered with slough with moderate malodor.  Wound cleansed with NS, therahoney applied to wound bed and covered with opticel ag, ABD pad and rolled gauze. Dressing to be changed daily and PRN for soiling. Recommend evaluation by vascular or podiatry for this wound and santyl. MASD wound to posterior scrotum, wound bed is pink/red with minimal drainage. Wound cleansed with remedy barrier wipes and zinc paste applied to area. Stage 2 PI with MASD noted to right buttocks. Wound bed pink/red with minimal drainage. Wound cleansed with remedy barrier wipes and zinc paste applied to area. Apply Remedy Z-Guard Protectant Paste TID and prn for soiling. If soiled, remove top soiled layer only and reapply. Use Remedy Phytoplex Barrier Cream wipes for gentle cleansing. To completely remove, use Remedy Foaming Cleanser or soap and water. Patient is to be turned approximately every 2 hours with wedge/pillows at an angle of 30 degrees or more if can be tolerated and not contraindicated. FOB should be elevated to prevent friction/shear from patient sliding down in bed. HOB should be elevated to 30 degrees or less to assist with offloading and pressure reduction to sacrum/coccyx. Float heels on pillow at all times while in bed, place pillow long ways under patient leg so knee is supported and heel is hanging off edge of pillows. Vidya Adamson will continue to monitor, please re-consult for any changes in skin condition.      Discharge Wound Care Needs:    Teaching completed with:   [] Patient           [] Family member       [] Caregiver          [] Nursing  [] Other    Patient/Caregiver Teaching:  Level of patient/caregiver understanding able to:   [] Indicates understanding       [] Needs reinforcement  [] Unsuccessful      [] Verbal Understanding  [] Demonstrated understanding       [] No evidence of learning  [] Refused teaching         [] N/A       Kamille John RN, BSN  [facility] Vidya Adamson   06/07/22  11:10 AM

## 2022-06-07 NOTE — PROGRESS NOTES
Hospitalist Progress Note    Subjective:   Daily Progress Note: 6/7/2022 9:06 AM    Patient seen while working with wound care. Patient confused and appears to nod yes/no but unclear is he understands.     Current Facility-Administered Medications   Medication Dose Route Frequency    0.9% sodium chloride with KCl 40 mEq/L infusion   IntraVENous CONTINUOUS    cefTRIAXone (ROCEPHIN) 1 g in sterile water (preservative free) 10 mL IV syringe  1 g IntraVENous Q24H    insulin lispro (HUMALOG) injection   SubCUTAneous Q6H    glucose chewable tablet 16 g  4 Tablet Oral PRN    glucagon (GLUCAGEN) injection 1 mg  1 mg IntraMUSCular PRN    dextrose 10% infusion 0-250 mL  0-250 mL IntraVENous PRN    labetaloL (NORMODYNE;TRANDATE) 20 mg/4 mL (5 mg/mL) injection 10 mg  10 mg IntraVENous Q4H PRN    sodium chloride (NS) flush 5-40 mL  5-40 mL IntraVENous Q8H    sodium chloride (NS) flush 5-40 mL  5-40 mL IntraVENous PRN    acetaminophen (TYLENOL) tablet 650 mg  650 mg Oral Q6H PRN    Or    acetaminophen (TYLENOL) suppository 650 mg  650 mg Rectal Q6H PRN    polyethylene glycol (MIRALAX) packet 17 g  17 g Oral DAILY PRN    ondansetron (ZOFRAN ODT) tablet 4 mg  4 mg Oral Q8H PRN    Or    ondansetron (ZOFRAN) injection 4 mg  4 mg IntraVENous Q6H PRN    enoxaparin (LOVENOX) injection 40 mg  40 mg SubCUTAneous DAILY    haloperidol lactate (HALDOL) injection 2 mg  2 mg IntraMUSCular Q4H PRN    insulin glargine (LANTUS) injection 14 Units  14 Units SubCUTAneous DAILY    fentaNYL citrate (PF) injection    PRN    midazolam (PF) (VERSED) 1 mg/mL injection    PRN        Review of Systems  Review of Systems   Unable to perform ROS: Acuity of condition            Objective:     Visit Vitals  BP (!) 159/82 (BP 1 Location: Left upper arm, BP Patient Position: At rest)   Pulse 85   Temp 98.1 °F (36.7 °C)   Resp 12   Ht 5' 10\" (1.778 m)   Wt 83.9 kg (185 lb)   SpO2 100%   BMI 26.54 kg/m²      O2 Device: None (Room air)    Temp (24hrs), Av.2 °F (36.8 °C), Min:98 °F (36.7 °C), Max:98.4 °F (36.9 °C)      No intake/output data recorded.  1901 -  0700  In: 1271.3 [I.V.:1211.3]  Out: 650     Recent Results (from the past 24 hour(s))   GLUCOSE, POC    Collection Time: 22 10:19 AM   Result Value Ref Range    Glucose (POC) 80 65 - 117 mg/dL    Performed by Nina Velazco    GLUCOSE, POC    Collection Time: 22 11:49 AM   Result Value Ref Range    Glucose (POC) 79 65 - 117 mg/dL    Performed by Major Nunez    GLUCOSE, POC    Collection Time: 22  9:42 PM   Result Value Ref Range    Glucose (POC) 70 65 - 117 mg/dL    Performed by [address] [place] [address] Drive, POC    Collection Time: 22 12:49 AM   Result Value Ref Range    Glucose (POC) 84 65 - 117 mg/dL    Performed by [employer]    GLUCOSE, POC    Collection Time: 22  6:02 AM   Result Value Ref Range    Glucose (POC) 88 65 - 117 mg/dL    Performed by Mavis Clear         XR CHEST PORT   Final Result   Negative exam.      XR ABD (KUB)   Final Result   Persistent sigmoid volvulus. XR CHEST PORT   Final Result   Diminished bibasilar atelectasis. XR ABD ACUTE W 1 V CHEST   Final Result   1. Enteric tube insertion. 2. Persistent distention of small and large bowel. 3. Mild bibasilar atelectasis. CT ABD PELV WO CONT   Final Result   Sigmoid volvulus. CT ABD PELV WO CONT    (Results Pending)        PHYSICAL EXAM:    Physical Exam  Vitals and nursing note reviewed. HENT:      Head: Normocephalic and atraumatic. Nose:      Comments: NG tube in place  Cardiovascular:      Rate and Rhythm: Normal rate and regular rhythm. Pulmonary:      Effort: No respiratory distress. Breath sounds: No wheezing. Abdominal:      General: Bowel sounds are normal. There is distension. Palpations: Abdomen is soft. Tenderness: There is no abdominal tenderness.    Genitourinary:     Comments: Rectal tube in place  Musculoskeletal: Right lower leg: No edema. Left lower leg: No edema. Skin:     Capillary Refill: Capillary refill takes less than 2 seconds. Comments: Left heel: slough with devitalized tissue with moderate malodor  Sacral wounds   Neurological:      Mental Status: He is alert. Comments: Patient appears to be interactive with yes/no questions but is difficult to ascertain cognition    Psychiatric:      Comments: Unable to assess          Data Review    Recent Results (from the past 24 hour(s))   GLUCOSE, POC    Collection Time: 06/06/22 10:19 AM   Result Value Ref Range    Glucose (POC) 80 65 - 117 mg/dL    Performed by Lauren Garg    GLUCOSE, POC    Collection Time: 06/06/22 11:49 AM   Result Value Ref Range    Glucose (POC) 79 65 - 117 mg/dL    Performed by Mauro Monreal    GLUCOSE, POC    Collection Time: 06/06/22  9:42 PM   Result Value Ref Range    Glucose (POC) 70 65 - 117 mg/dL    Performed by [address] [place] [address] Drive, POC    Collection Time: 06/07/22 12:49 AM   Result Value Ref Range    Glucose (POC) 84 65 - 117 mg/dL    Performed by Neeta Mustafa, POC    Collection Time: 06/07/22  6:02 AM   Result Value Ref Range    Glucose (POC) 88 65 - 117 mg/dL    Performed by Donavon Gonzalez         Assessment/Plan:     Active Problems:    Sigmoid volvulus ([address] (6/4/2022)        Hospital Course:    Tina Carnes is a 29-year-old male with a past medical history of schizophrenia, hypertension, DM, and hyperlipidemia who presented with abdominal pain. ED vital signs unremarkable. Initial labs significant for creatinine of 2.02 and lactic of 3. Blood culture pending. CT abdomen/pelvis showed sigmoid volvulus. Patient admitted for further work-up. Patient started on Rocephin. NG tube and rectal tube placed. General surgery and GI consulted. Underwent sigmoidoscopy with decompression on 6/4. KUB showed enteric tube insertion, mild bibasilar atelectasis, persistent distention of small and large bowel.  KUB 6/5 showed persistent sigmoid volvulus. CXR negative for acute pulmonary abnormality. Repeat CT of the abdomen pelvis pending. Surgery to speak with family regarding surgical intervention. Sigmoid volvulus  S/p sigmoidoscopy with decompression on 6/4  NG and rectal tube tube in place  Continue IVF infusion  Repeat CT abd/pelvis pending  GI/General surgery following    Left foot ulcer  Afebrile, no elevated WBC, inflammatory markers pending  Discontinue ceftriaxone and start Zosyn for empiric coverage  6/4 blood: NGTD, prelim  Wound culture pending  Wound care following    Hypokalemia  Continue on IV supplemenation    Deaf  Noncommunicative, currently calm  Haldol as needed    Diabetes mellitus, type II  A1c pending  Continue on Lantus 14 units and sliding scale insulin as needed    CKD II  Creatinine stable  Monitor    Urinary retention  Monitor urinary output and consider Sosa cath if needed    DVT Prophylaxis: lovenox  Discharge and disposition barriers: potential surgery tomorrow, CT ab/pelvis read, 48 hours    Care Plan discussed with: Patient/Family, Nurse and     Total time spent with patient: 35 minutes.

## 2022-06-08 ENCOUNTER — APPOINTMENT (OUTPATIENT)
Dept: GENERAL RADIOLOGY | Age: 72
DRG: 330 | End: 2022-06-08
Attending: INTERNAL MEDICINE
Payer: MEDICARE

## 2022-06-08 ENCOUNTER — ANESTHESIA (OUTPATIENT)
Dept: SURGERY | Age: 72
DRG: 330 | End: 2022-06-08
Payer: MEDICARE

## 2022-06-08 LAB
ALBUMIN SERPL-MCNC: 2.7 G/DL (ref 3.5–5)
ALBUMIN/GLOB SERPL: 0.6 {RATIO} (ref 1.1–2.2)
ALP SERPL-CCNC: 114 U/L (ref 45–117)
ALT SERPL-CCNC: 9 U/L (ref 12–78)
ANION GAP SERPL CALC-SCNC: 7 MMOL/L (ref 5–15)
AST SERPL W P-5'-P-CCNC: 11 U/L (ref 15–37)
BASOPHILS # BLD: 0 K/UL (ref 0–0.1)
BASOPHILS NFR BLD: 0 % (ref 0–1)
BILIRUB SERPL-MCNC: 0.4 MG/DL (ref 0.2–1)
BUN SERPL-MCNC: 9 MG/DL (ref 6–20)
BUN/CREAT SERPL: 10 (ref 12–20)
CA-I BLD-MCNC: 8.5 MG/DL (ref 8.5–10.1)
CHLORIDE SERPL-SCNC: 115 MMOL/L (ref 97–108)
CO2 SERPL-SCNC: 24 MMOL/L (ref 21–32)
CREAT SERPL-MCNC: 0.88 MG/DL (ref 0.7–1.3)
CRP SERPL-MCNC: 10.7 MG/DL (ref 0–0.6)
DIFFERENTIAL METHOD BLD: ABNORMAL
EOSINOPHIL # BLD: 0.1 K/UL (ref 0–0.4)
EOSINOPHIL NFR BLD: 2 % (ref 0–7)
ERYTHROCYTE [DISTWIDTH] IN BLOOD BY AUTOMATED COUNT: 15.2 % (ref 11.5–14.5)
EST. AVERAGE GLUCOSE BLD GHB EST-MCNC: 177 MG/DL
GLOBULIN SER CALC-MCNC: 4.6 G/DL (ref 2–4)
GLUCOSE BLD STRIP.AUTO-MCNC: 110 MG/DL (ref 65–117)
GLUCOSE BLD STRIP.AUTO-MCNC: 60 MG/DL (ref 65–117)
GLUCOSE BLD STRIP.AUTO-MCNC: 73 MG/DL (ref 65–117)
GLUCOSE BLD STRIP.AUTO-MCNC: 77 MG/DL (ref 65–117)
GLUCOSE BLD STRIP.AUTO-MCNC: 81 MG/DL (ref 65–117)
GLUCOSE BLD STRIP.AUTO-MCNC: 82 MG/DL (ref 65–117)
GLUCOSE SERPL-MCNC: 71 MG/DL (ref 65–100)
HBA1C MFR BLD: 7.8 % (ref 4–5.6)
HCT VFR BLD AUTO: 29.9 % (ref 36.6–50.3)
HGB BLD-MCNC: 9.3 G/DL (ref 12.1–17)
IMM GRANULOCYTES # BLD AUTO: 0 K/UL (ref 0–0.04)
IMM GRANULOCYTES NFR BLD AUTO: 1 % (ref 0–0.5)
LYMPHOCYTES # BLD: 1.3 K/UL (ref 0.8–3.5)
LYMPHOCYTES NFR BLD: 17 % (ref 12–49)
MCH RBC QN AUTO: 26.9 PG (ref 26–34)
MCHC RBC AUTO-ENTMCNC: 31.1 G/DL (ref 30–36.5)
MCV RBC AUTO: 86.4 FL (ref 80–99)
MONOCYTES # BLD: 0.8 K/UL (ref 0–1)
MONOCYTES NFR BLD: 11 % (ref 5–13)
NEUTS SEG # BLD: 5 K/UL (ref 1.8–8)
NEUTS SEG NFR BLD: 69 % (ref 32–75)
NRBC # BLD: 0 K/UL (ref 0–0.01)
NRBC BLD-RTO: 0 PER 100 WBC
PERFORMED BY, TECHID: ABNORMAL
PERFORMED BY, TECHID: NORMAL
PLATELET # BLD AUTO: 284 K/UL (ref 150–400)
PMV BLD AUTO: 11 FL (ref 8.9–12.9)
POTASSIUM SERPL-SCNC: 2.9 MMOL/L (ref 3.5–5.1)
PROT SERPL-MCNC: 7.3 G/DL (ref 6.4–8.2)
RBC # BLD AUTO: 3.46 M/UL (ref 4.1–5.7)
SODIUM SERPL-SCNC: 146 MMOL/L (ref 136–145)
WBC # BLD AUTO: 7.3 K/UL (ref 4.1–11.1)

## 2022-06-08 PROCEDURE — 86140 C-REACTIVE PROTEIN: CPT

## 2022-06-08 PROCEDURE — 76010000149 HC OR TIME 1 TO 1.5 HR: Performed by: SURGERY

## 2022-06-08 PROCEDURE — 74011250636 HC RX REV CODE- 250/636: Performed by: NURSE ANESTHETIST, CERTIFIED REGISTERED

## 2022-06-08 PROCEDURE — 77030031139 HC SUT VCRL2 J&J -A: Performed by: SURGERY

## 2022-06-08 PROCEDURE — 36415 COLL VENOUS BLD VENIPUNCTURE: CPT

## 2022-06-08 PROCEDURE — 76210000006 HC OR PH I REC 0.5 TO 1 HR: Performed by: SURGERY

## 2022-06-08 PROCEDURE — 82962 GLUCOSE BLOOD TEST: CPT

## 2022-06-08 PROCEDURE — 0DBN0ZZ EXCISION OF SIGMOID COLON, OPEN APPROACH: ICD-10-PCS | Performed by: SURGERY

## 2022-06-08 PROCEDURE — 77030040361 HC SLV COMPR DVT MDII -B: Performed by: SURGERY

## 2022-06-08 PROCEDURE — 85025 COMPLETE CBC W/AUTO DIFF WBC: CPT

## 2022-06-08 PROCEDURE — 77030005513 HC CATH URETH FOL11 MDII -B: Performed by: SURGERY

## 2022-06-08 PROCEDURE — 44146 PARTIAL REMOVAL OF COLON: CPT | Performed by: SURGERY

## 2022-06-08 PROCEDURE — 77030002996 HC SUT SLK J&J -A: Performed by: SURGERY

## 2022-06-08 PROCEDURE — 77030010522: Performed by: SURGERY

## 2022-06-08 PROCEDURE — 77030010293 HC STPLR INT TI J&J -B: Performed by: SURGERY

## 2022-06-08 PROCEDURE — 77030002966 HC SUT PDS J&J -A: Performed by: SURGERY

## 2022-06-08 PROCEDURE — 88307 TISSUE EXAM BY PATHOLOGIST: CPT

## 2022-06-08 PROCEDURE — 74011000250 HC RX REV CODE- 250: Performed by: NURSE ANESTHETIST, CERTIFIED REGISTERED

## 2022-06-08 PROCEDURE — 74011000250 HC RX REV CODE- 250: Performed by: SURGERY

## 2022-06-08 PROCEDURE — 71045 X-RAY EXAM CHEST 1 VIEW: CPT

## 2022-06-08 PROCEDURE — 74011000250 HC RX REV CODE- 250: Performed by: INTERNAL MEDICINE

## 2022-06-08 PROCEDURE — 80053 COMPREHEN METABOLIC PANEL: CPT

## 2022-06-08 PROCEDURE — 76060000033 HC ANESTHESIA 1 TO 1.5 HR: Performed by: SURGERY

## 2022-06-08 PROCEDURE — 77030005521 HC CATH URETH FOL38 BARD -B: Performed by: SURGERY

## 2022-06-08 PROCEDURE — 77030009979 HC RELD STPLR TCR J&J -C: Performed by: SURGERY

## 2022-06-08 PROCEDURE — 0D1N4Z4 BYPASS SIGMOID COLON TO CUTANEOUS, PERCUTANEOUS ENDOSCOPIC APPROACH: ICD-10-PCS | Performed by: SURGERY

## 2022-06-08 PROCEDURE — 77030002916 HC SUT ETHLN J&J -A: Performed by: SURGERY

## 2022-06-08 PROCEDURE — 77030038552 HC DRN WND MDII -A: Performed by: SURGERY

## 2022-06-08 PROCEDURE — 74011250636 HC RX REV CODE- 250/636: Performed by: INTERNAL MEDICINE

## 2022-06-08 PROCEDURE — 2709999900 HC NON-CHARGEABLE SUPPLY: Performed by: SURGERY

## 2022-06-08 PROCEDURE — 65270000029 HC RM PRIVATE

## 2022-06-08 PROCEDURE — 77030008462 HC STPLR SKN PROX J&J -A: Performed by: SURGERY

## 2022-06-08 PROCEDURE — 77030003029 HC SUT VCRL J&J -B: Performed by: SURGERY

## 2022-06-08 PROCEDURE — 74011250636 HC RX REV CODE- 250/636: Performed by: STUDENT IN AN ORGANIZED HEALTH CARE EDUCATION/TRAINING PROGRAM

## 2022-06-08 PROCEDURE — 77030026102 HC DEV TISS ENSEAL G2 J&J -F: Performed by: SURGERY

## 2022-06-08 PROCEDURE — 74011000250 HC RX REV CODE- 250: Performed by: ANESTHESIOLOGY

## 2022-06-08 PROCEDURE — 99024 POSTOP FOLLOW-UP VISIT: CPT | Performed by: SURGERY

## 2022-06-08 PROCEDURE — 74011250636 HC RX REV CODE- 250/636: Performed by: SURGERY

## 2022-06-08 PROCEDURE — 74011000258 HC RX REV CODE- 258: Performed by: STUDENT IN AN ORGANIZED HEALTH CARE EDUCATION/TRAINING PROGRAM

## 2022-06-08 PROCEDURE — 77030011264 HC ELECTRD BLD EXT COVD -A: Performed by: SURGERY

## 2022-06-08 PROCEDURE — 77030013567 HC DRN WND RESERV BARD -A: Performed by: SURGERY

## 2022-06-08 PROCEDURE — 0DNN0ZZ RELEASE SIGMOID COLON, OPEN APPROACH: ICD-10-PCS | Performed by: SURGERY

## 2022-06-08 RX ORDER — FENTANYL CITRATE 50 UG/ML
50 INJECTION, SOLUTION INTRAMUSCULAR; INTRAVENOUS
Status: DISCONTINUED | OUTPATIENT
Start: 2022-06-08 | End: 2022-06-08 | Stop reason: HOSPADM

## 2022-06-08 RX ORDER — ENOXAPARIN SODIUM 100 MG/ML
40 INJECTION SUBCUTANEOUS DAILY
Status: DISCONTINUED | OUTPATIENT
Start: 2022-06-09 | End: 2022-06-21 | Stop reason: HOSPADM

## 2022-06-08 RX ORDER — SODIUM CHLORIDE 0.9 % (FLUSH) 0.9 %
5-40 SYRINGE (ML) INJECTION EVERY 8 HOURS
Status: DISCONTINUED | OUTPATIENT
Start: 2022-06-08 | End: 2022-06-08 | Stop reason: HOSPADM

## 2022-06-08 RX ORDER — MORPHINE SULFATE 2 MG/ML
2 INJECTION, SOLUTION INTRAMUSCULAR; INTRAVENOUS
Status: DISCONTINUED | OUTPATIENT
Start: 2022-06-08 | End: 2022-06-08 | Stop reason: HOSPADM

## 2022-06-08 RX ORDER — LIDOCAINE HYDROCHLORIDE 10 MG/ML
0.1 INJECTION, SOLUTION EPIDURAL; INFILTRATION; INTRACAUDAL; PERINEURAL AS NEEDED
Status: DISCONTINUED | OUTPATIENT
Start: 2022-06-08 | End: 2022-06-08 | Stop reason: HOSPADM

## 2022-06-08 RX ORDER — FENTANYL CITRATE 50 UG/ML
50 INJECTION, SOLUTION INTRAMUSCULAR; INTRAVENOUS AS NEEDED
Status: DISCONTINUED | OUTPATIENT
Start: 2022-06-08 | End: 2022-06-08 | Stop reason: HOSPADM

## 2022-06-08 RX ORDER — DIPHENHYDRAMINE HYDROCHLORIDE 50 MG/ML
12.5 INJECTION, SOLUTION INTRAMUSCULAR; INTRAVENOUS AS NEEDED
Status: DISCONTINUED | OUTPATIENT
Start: 2022-06-08 | End: 2022-06-08 | Stop reason: HOSPADM

## 2022-06-08 RX ORDER — FENTANYL CITRATE 50 UG/ML
INJECTION, SOLUTION INTRAMUSCULAR; INTRAVENOUS AS NEEDED
Status: DISCONTINUED | OUTPATIENT
Start: 2022-06-08 | End: 2022-06-08 | Stop reason: HOSPADM

## 2022-06-08 RX ORDER — POTASSIUM CHLORIDE 7.45 MG/ML
10 INJECTION INTRAVENOUS
Status: DISPENSED | OUTPATIENT
Start: 2022-06-08 | End: 2022-06-08

## 2022-06-08 RX ORDER — SODIUM CHLORIDE 9 MG/ML
125 INJECTION, SOLUTION INTRAVENOUS CONTINUOUS
Status: DISCONTINUED | OUTPATIENT
Start: 2022-06-08 | End: 2022-06-09

## 2022-06-08 RX ORDER — HYDROMORPHONE HYDROCHLORIDE 1 MG/ML
1 INJECTION, SOLUTION INTRAMUSCULAR; INTRAVENOUS; SUBCUTANEOUS
Status: DISCONTINUED | OUTPATIENT
Start: 2022-06-08 | End: 2022-06-21 | Stop reason: HOSPADM

## 2022-06-08 RX ORDER — OXYCODONE AND ACETAMINOPHEN 5; 325 MG/1; MG/1
1 TABLET ORAL AS NEEDED
Status: DISCONTINUED | OUTPATIENT
Start: 2022-06-08 | End: 2022-06-08 | Stop reason: HOSPADM

## 2022-06-08 RX ORDER — LABETALOL HCL 20 MG/4 ML
5 SYRINGE (ML) INTRAVENOUS
Status: DISCONTINUED | OUTPATIENT
Start: 2022-06-08 | End: 2022-06-08 | Stop reason: HOSPADM

## 2022-06-08 RX ORDER — ONDANSETRON 2 MG/ML
4 INJECTION INTRAMUSCULAR; INTRAVENOUS AS NEEDED
Status: DISCONTINUED | OUTPATIENT
Start: 2022-06-08 | End: 2022-06-08 | Stop reason: HOSPADM

## 2022-06-08 RX ORDER — NORETHINDRONE AND ETHINYL ESTRADIOL 0.5-0.035
5 KIT ORAL AS NEEDED
Status: DISCONTINUED | OUTPATIENT
Start: 2022-06-08 | End: 2022-06-08 | Stop reason: HOSPADM

## 2022-06-08 RX ORDER — PROPOFOL 10 MG/ML
INJECTION, EMULSION INTRAVENOUS AS NEEDED
Status: DISCONTINUED | OUTPATIENT
Start: 2022-06-08 | End: 2022-06-08 | Stop reason: HOSPADM

## 2022-06-08 RX ORDER — MIDAZOLAM HYDROCHLORIDE 1 MG/ML
0.5 INJECTION, SOLUTION INTRAMUSCULAR; INTRAVENOUS
Status: DISCONTINUED | OUTPATIENT
Start: 2022-06-08 | End: 2022-06-08 | Stop reason: HOSPADM

## 2022-06-08 RX ORDER — DEXAMETHASONE SODIUM PHOSPHATE 4 MG/ML
INJECTION, SOLUTION INTRA-ARTICULAR; INTRALESIONAL; INTRAMUSCULAR; INTRAVENOUS; SOFT TISSUE AS NEEDED
Status: DISCONTINUED | OUTPATIENT
Start: 2022-06-08 | End: 2022-06-08 | Stop reason: HOSPADM

## 2022-06-08 RX ORDER — HYDROMORPHONE HYDROCHLORIDE 1 MG/ML
0.5 INJECTION, SOLUTION INTRAMUSCULAR; INTRAVENOUS; SUBCUTANEOUS
Status: DISCONTINUED | OUTPATIENT
Start: 2022-06-08 | End: 2022-06-08 | Stop reason: HOSPADM

## 2022-06-08 RX ORDER — HYDROMORPHONE HYDROCHLORIDE 1 MG/ML
0.25 INJECTION, SOLUTION INTRAMUSCULAR; INTRAVENOUS; SUBCUTANEOUS
Status: DISCONTINUED | OUTPATIENT
Start: 2022-06-08 | End: 2022-06-21 | Stop reason: HOSPADM

## 2022-06-08 RX ORDER — SUCCINYLCHOLINE CHLORIDE 20 MG/ML
INJECTION INTRAMUSCULAR; INTRAVENOUS AS NEEDED
Status: DISCONTINUED | OUTPATIENT
Start: 2022-06-08 | End: 2022-06-08 | Stop reason: HOSPADM

## 2022-06-08 RX ORDER — ONDANSETRON 4 MG/1
4 TABLET, ORALLY DISINTEGRATING ORAL
Status: DISCONTINUED | OUTPATIENT
Start: 2022-06-08 | End: 2022-06-21 | Stop reason: HOSPADM

## 2022-06-08 RX ORDER — LIDOCAINE HYDROCHLORIDE 20 MG/ML
INJECTION, SOLUTION EPIDURAL; INFILTRATION; INTRACAUDAL; PERINEURAL AS NEEDED
Status: DISCONTINUED | OUTPATIENT
Start: 2022-06-08 | End: 2022-06-08 | Stop reason: HOSPADM

## 2022-06-08 RX ORDER — SODIUM CHLORIDE 0.9 % (FLUSH) 0.9 %
5-40 SYRINGE (ML) INJECTION AS NEEDED
Status: DISCONTINUED | OUTPATIENT
Start: 2022-06-08 | End: 2022-06-08 | Stop reason: HOSPADM

## 2022-06-08 RX ORDER — MIDAZOLAM HYDROCHLORIDE 1 MG/ML
1 INJECTION, SOLUTION INTRAMUSCULAR; INTRAVENOUS AS NEEDED
Status: DISCONTINUED | OUTPATIENT
Start: 2022-06-08 | End: 2022-06-08 | Stop reason: HOSPADM

## 2022-06-08 RX ORDER — SODIUM CHLORIDE 0.9 % (FLUSH) 0.9 %
5-40 SYRINGE (ML) INJECTION AS NEEDED
Status: DISCONTINUED | OUTPATIENT
Start: 2022-06-08 | End: 2022-06-11

## 2022-06-08 RX ORDER — ROCURONIUM BROMIDE 10 MG/ML
INJECTION, SOLUTION INTRAVENOUS AS NEEDED
Status: DISCONTINUED | OUTPATIENT
Start: 2022-06-08 | End: 2022-06-08 | Stop reason: HOSPADM

## 2022-06-08 RX ORDER — ONDANSETRON 2 MG/ML
4 INJECTION INTRAMUSCULAR; INTRAVENOUS
Status: DISCONTINUED | OUTPATIENT
Start: 2022-06-08 | End: 2022-06-21 | Stop reason: HOSPADM

## 2022-06-08 RX ORDER — SODIUM CHLORIDE 0.9 % (FLUSH) 0.9 %
5-40 SYRINGE (ML) INJECTION EVERY 8 HOURS
Status: DISCONTINUED | OUTPATIENT
Start: 2022-06-08 | End: 2022-06-11

## 2022-06-08 RX ORDER — POTASSIUM CHLORIDE 7.45 MG/ML
10 INJECTION INTRAVENOUS ONCE
Status: COMPLETED | OUTPATIENT
Start: 2022-06-08 | End: 2022-06-08

## 2022-06-08 RX ORDER — ONDANSETRON 2 MG/ML
INJECTION INTRAMUSCULAR; INTRAVENOUS AS NEEDED
Status: DISCONTINUED | OUTPATIENT
Start: 2022-06-08 | End: 2022-06-08 | Stop reason: HOSPADM

## 2022-06-08 RX ORDER — HYDRALAZINE HYDROCHLORIDE 20 MG/ML
10 INJECTION INTRAMUSCULAR; INTRAVENOUS
Status: DISCONTINUED | OUTPATIENT
Start: 2022-06-08 | End: 2022-06-08 | Stop reason: HOSPADM

## 2022-06-08 RX ORDER — METOPROLOL TARTRATE 5 MG/5ML
2.5 INJECTION INTRAVENOUS
Status: DISCONTINUED | OUTPATIENT
Start: 2022-06-08 | End: 2022-06-08 | Stop reason: HOSPADM

## 2022-06-08 RX ORDER — SODIUM CHLORIDE, SODIUM LACTATE, POTASSIUM CHLORIDE, CALCIUM CHLORIDE 600; 310; 30; 20 MG/100ML; MG/100ML; MG/100ML; MG/100ML
INJECTION, SOLUTION INTRAVENOUS
Status: DISCONTINUED | OUTPATIENT
Start: 2022-06-08 | End: 2022-06-08 | Stop reason: HOSPADM

## 2022-06-08 RX ADMIN — SUCCINYLCHOLINE CHLORIDE 120 MG: 20 INJECTION, SOLUTION INTRAMUSCULAR; INTRAVENOUS at 16:12

## 2022-06-08 RX ADMIN — FENTANYL CITRATE 50 MCG: 50 INJECTION, SOLUTION INTRAMUSCULAR; INTRAVENOUS at 16:37

## 2022-06-08 RX ADMIN — FENTANYL CITRATE 50 MCG: 50 INJECTION, SOLUTION INTRAMUSCULAR; INTRAVENOUS at 16:12

## 2022-06-08 RX ADMIN — SODIUM CHLORIDE, PRESERVATIVE FREE 10 ML: 5 INJECTION INTRAVENOUS at 17:54

## 2022-06-08 RX ADMIN — PIPERACILLIN AND TAZOBACTAM 3.38 G: 3; .375 INJECTION, POWDER, LYOPHILIZED, FOR SOLUTION INTRAVENOUS at 16:10

## 2022-06-08 RX ADMIN — SODIUM CHLORIDE, PRESERVATIVE FREE 10 ML: 5 INJECTION INTRAVENOUS at 13:31

## 2022-06-08 RX ADMIN — SUGAMMADEX 400 MG: 100 INJECTION, SOLUTION INTRAVENOUS at 16:55

## 2022-06-08 RX ADMIN — COLLAGENASE SANTYL: 250 OINTMENT TOPICAL at 13:34

## 2022-06-08 RX ADMIN — WHITE PETROLATUM,ZINC OXIDE: 20; 75 PASTE TOPICAL at 21:00

## 2022-06-08 RX ADMIN — SODIUM CHLORIDE, PRESERVATIVE FREE 10 ML: 5 INJECTION INTRAVENOUS at 05:15

## 2022-06-08 RX ADMIN — PROPOFOL 50 MG: 10 INJECTION, EMULSION INTRAVENOUS at 16:20

## 2022-06-08 RX ADMIN — POTASSIUM CHLORIDE 10 MEQ: 7.46 INJECTION, SOLUTION INTRAVENOUS at 13:27

## 2022-06-08 RX ADMIN — SODIUM CHLORIDE, PRESERVATIVE FREE 10 ML: 5 INJECTION INTRAVENOUS at 21:46

## 2022-06-08 RX ADMIN — DEXAMETHASONE SODIUM PHOSPHATE 4 MG: 4 INJECTION, SOLUTION INTRA-ARTICULAR; INTRALESIONAL; INTRAMUSCULAR; INTRAVENOUS; SOFT TISSUE at 16:29

## 2022-06-08 RX ADMIN — PIPERACILLIN AND TAZOBACTAM 3.38 G: 3; .375 INJECTION, POWDER, LYOPHILIZED, FOR SOLUTION INTRAVENOUS at 13:30

## 2022-06-08 RX ADMIN — ONDANSETRON 4 MG: 2 INJECTION INTRAMUSCULAR; INTRAVENOUS at 16:31

## 2022-06-08 RX ADMIN — SODIUM CHLORIDE, PRESERVATIVE FREE 10 ML: 5 INJECTION INTRAVENOUS at 14:35

## 2022-06-08 RX ADMIN — POTASSIUM CHLORIDE 10 MEQ: 7.46 INJECTION, SOLUTION INTRAVENOUS at 18:08

## 2022-06-08 RX ADMIN — ROCURONIUM BROMIDE 5 MG: 50 INJECTION, SOLUTION INTRAVENOUS at 16:12

## 2022-06-08 RX ADMIN — LIDOCAINE HYDROCHLORIDE 100 MG: 20 INJECTION, SOLUTION EPIDURAL; INFILTRATION; INTRACAUDAL; PERINEURAL at 16:12

## 2022-06-08 RX ADMIN — PROPOFOL 150 MG: 10 INJECTION, EMULSION INTRAVENOUS at 16:12

## 2022-06-08 RX ADMIN — POTASSIUM CHLORIDE AND SODIUM CHLORIDE: 900; 300 INJECTION, SOLUTION INTRAVENOUS at 07:54

## 2022-06-08 RX ADMIN — PIPERACILLIN AND TAZOBACTAM 3.38 G: 3; .375 INJECTION, POWDER, LYOPHILIZED, FOR SOLUTION INTRAVENOUS at 05:14

## 2022-06-08 RX ADMIN — SODIUM CHLORIDE, POTASSIUM CHLORIDE, SODIUM LACTATE AND CALCIUM CHLORIDE: 600; 310; 30; 20 INJECTION, SOLUTION INTRAVENOUS at 16:07

## 2022-06-08 RX ADMIN — WHITE PETROLATUM,ZINC OXIDE: 20; 75 PASTE TOPICAL at 09:00

## 2022-06-08 RX ADMIN — DEXTROSE MONOHYDRATE 125 ML: 100 INJECTION, SOLUTION INTRAVENOUS at 00:32

## 2022-06-08 RX ADMIN — PIPERACILLIN AND TAZOBACTAM 3.38 G: 3; .375 INJECTION, POWDER, LYOPHILIZED, FOR SOLUTION INTRAVENOUS at 21:38

## 2022-06-08 RX ADMIN — POTASSIUM CHLORIDE 10 MEQ: 7.46 INJECTION, SOLUTION INTRAVENOUS at 14:35

## 2022-06-08 RX ADMIN — SODIUM CHLORIDE 125 ML/HR: 9 INJECTION, SOLUTION INTRAVENOUS at 18:00

## 2022-06-08 RX ADMIN — ROCURONIUM BROMIDE 45 MG: 50 INJECTION, SOLUTION INTRAVENOUS at 16:23

## 2022-06-08 NOTE — PROGRESS NOTES
CM reviewed chart. patient going down for surgical procedure today. Updated clinicals sent via [facility] to [facility] where patient is [facility] resident.

## 2022-06-08 NOTE — PROGRESS NOTES
Problem: Pressure Injury - Risk of  Goal: *Prevention of pressure injury  Description: Document Jet Scale and appropriate interventions in the flowsheet.   Outcome: Progressing Towards Goal  Note: Pressure Injury Interventions:  Sensory Interventions: Avoid rigorous massage over bony prominences    Moisture Interventions: Absorbent underpads    Activity Interventions: PT/OT evaluation    Mobility Interventions: HOB 30 degrees or less    Nutrition Interventions: Document food/fluid/supplement intake    Friction and Shear Interventions: HOB 30 degrees or less

## 2022-06-08 NOTE — PROGRESS NOTES
Problem: Non-Violent Restraints  Goal: Removal from restraints as soon as assessed to be safe  Outcome: Not Progressing Towards Goal  Goal: No harm/injury to patient while restraints in use  Outcome: Not Progressing Towards Goal  Goal: Patient's dignity will be maintained  Outcome: Not Progressing Towards Goal  Goal: Patient Interventions  Outcome: Not Progressing Towards Goal     Problem: Pressure Injury - Risk of  Goal: *Prevention of pressure injury  Description: Document Jet Scale and appropriate interventions in the flowsheet. Outcome: Not Progressing Towards Goal  Note: Pressure Injury Interventions:  Sensory Interventions: Avoid rigorous massage over bony prominences    Moisture Interventions: Absorbent underpads    Activity Interventions: PT/OT evaluation    Mobility Interventions: HOB 30 degrees or less    Nutrition Interventions: Document food/fluid/supplement intake    Friction and Shear Interventions: HOB 30 degrees or less                Problem: Patient Education: Go to Patient Education Activity  Goal: Patient/Family Education  Outcome: Not Progressing Towards Goal     Problem: Falls - Risk of  Goal: *Absence of Falls  Description: Document Fabienne Fall Risk and appropriate interventions in the flowsheet. Outcome: Not Progressing Towards Goal  Note: Fall Risk Interventions:       Mentation Interventions: Adequate sleep, hydration, pain control    Medication Interventions: Bed/chair exit alarm    Elimination Interventions: Call light in reach              Problem: Patient Education: Go to Patient Education Activity  Goal: Patient/Family Education  Outcome: Not Progressing Towards Goal     Problem: Impaired Skin Integrity/Pressure Injury Treatment  Goal: *Improvement of Existing Pressure Injury  Outcome: Not Progressing Towards Goal  Goal: *Prevention of pressure injury  Description: Document Jet Scale and appropriate interventions in the flowsheet.   Outcome: Not Progressing Towards Goal  Note: Pressure Injury Interventions:  Sensory Interventions: Avoid rigorous massage over bony prominences    Moisture Interventions: Absorbent underpads    Activity Interventions: PT/OT evaluation    Mobility Interventions: HOB 30 degrees or less    Nutrition Interventions: Document food/fluid/supplement intake    Friction and Shear Interventions: HOB 30 degrees or less                Problem: Patient Education: Go to Patient Education Activity  Goal: Patient/Family Education  Outcome: Not Progressing Towards Goal

## 2022-06-08 NOTE — PROGRESS NOTES
PROGRESS NOTE      Chief Complaints:  Patient examined is  HPI and  Objective:    Patient is nonverbal.  Chart reviewed. Patient had no fever. Review of Systems:  Rest review of system negative, personally reviewed. EXAM:  Visit Vitals  BP (!) 162/83   Pulse 89   Temp 98.8 °F (37.1 °C)   Resp 16   Ht 5' 10\" (1.778 m)   Wt 185 lb (83.9 kg)   SpO2 100%   BMI 26.54 kg/m²       Patient is awake   Head and neck atraumatic, normocephalic. ENT: No hoarse voice  Cardiac system regular rate rhythm. Pulmonary no audible wheeze  Chest wall excursion normal with respiration cycle  Abdomen is soft distended  Neurologically nonfocal.  Skin is warm and moist.  Psychosocial: Cooperative. Vascular examination as previously noted no changes.     Recent Results (from the past 24 hour(s))   GLUCOSE, POC    Collection Time: 06/07/22 11:33 AM   Result Value Ref Range    Glucose (POC) 93 65 - 117 mg/dL    Performed by Tammy Watson    ANKLE BRACHIAL INDEX    Collection Time: 06/07/22  2:59 PM   Result Value Ref Range    Right dorsalis pedis  mmHg    Left dorsalis pedis  mmHg    Left arm  mmHg    Left posterior tibial 167 mmHg    Right posterior tibial 98 mmHg    Left ELVIRA 0.94     Right ELVIRA 0.89     Left toe pressure 111 mmHg    Right toe pressure 118 mmHg    Left TBI 0.63     Right TBI 0.67    GLUCOSE, POC    Collection Time: 06/07/22  5:51 PM   Result Value Ref Range    Glucose (POC) 90 65 - 117 mg/dL    Performed by Eliseo Becker    GLUCOSE, POC    Collection Time: 06/08/22 12:30 AM   Result Value Ref Range    Glucose (POC) 60 (L) 65 - 117 mg/dL    Performed by Javier Bridgesk, POC    Collection Time: 06/08/22  1:41 AM   Result Value Ref Range    Glucose (POC) 110 65 - 117 mg/dL    Performed by Javier Rousseau, POC    Collection Time: 06/08/22  5:23 AM   Result Value Ref Range    Glucose (POC) 81 65 - 117 mg/dL    Performed by Miryam Gottlieb        ASSESSMENT:   Patient is 66 y.o. with diagnosis of : Active Problems:    Sigmoid volvulus ([address] (6/4/2022)        PLAN:                 I discussed with the family with the CT scan findings. Patient essentially has not improved in terms of sigmoid volvulus. So I discussed yesterday with the family about surgical plans including exploratory laparotomy with a sigmoid colon resection possible colostomy placement. We talked about in details. Patient currently nonverbal and that he is not able to make decisions so the family had made a decision for him. Patient will be undergoing as a scheduled surgery today. Surgical consent forms obtained from family. Patient and family was explained about surgical risks benefits and complication.

## 2022-06-08 NOTE — ANESTHESIA PREPROCEDURE EVALUATION
Relevant Problems   No relevant active problems       Anesthetic History   No history of anesthetic complications            Review of Systems / Medical History  Patient summary reviewed, nursing notes reviewed and pertinent labs reviewed    Pulmonary  Within defined limits                 Neuro/Psych              Cardiovascular    Hypertension          Hyperlipidemia         GI/Hepatic/Renal         Renal disease: CRI       Endo/Other    Diabetes    Anemia     Other Findings            Past Medical History:   Diagnosis Date    Anemia     BPH (benign prostatic hyperplasia)     Chronic kidney disease     Diabetes ([facility] [address]     Hypercholesteremia     Hyperlipidemia     Neurological disorder     Nonspeaking deaf        History reviewed. No pertinent surgical history.     Current Outpatient Medications   Medication Instructions    atorvastatin (LIPITOR) 40 mg, Oral, DAILY    bethanechol (URECHOLINE) 25 mg, Oral, 3 TIMES DAILY    dilTIAZem ER (DILACOR XR) 180 mg, Oral, DAILY    finasteride (PROSCAR) 5 mg, Oral, DAILY    furosemide (LASIX) 40 mg, Oral, DAILY    Levemir U-100 Insulin 14 Units, SubCUTAneous, DAILY    metFORMIN (GLUCOPHAGE) 500 mg, Oral, 2 TIMES DAILY    metoprolol succinate (TOPROL-XL) 20 mg, Oral, DAILY    oxybutynin (DITROPAN) 5 mg, Oral, DAILY    pantoprazole (PROTONIX) 20 mg, Oral, DAILY    potassium chloride (K-DUR, KLOR-CON M20) 20 mEq tablet 20 mEq, Oral, DAILY    pregabalin (LYRICA) 100 mg, Oral, 2 TIMES DAILY    simethicone (GAS-X) 125 mg capsule 1 Capsule, Oral, 3 TIMES DAILY    spironolactone (ALDACTONE) 25 mg, Oral, DAILY    tamsulosin (FLOMAX) 0.4 mg, Oral, DAILY    Tradjenta 5 mg, Oral, DAILY       Current Facility-Administered Medications   Medication Dose Route Frequency    zinc oxide-white petrolatum 20-75 % topical paste   Topical BID    collagenase (SANTYL) 250 unit/gram ointment   Topical DAILY    piperacillin-tazobactam (ZOSYN) 3.375 g in 0.9% sodium chloride (MBP/ADV) 100 mL MBP  3.375 g IntraVENous Q8H    0.9% sodium chloride with KCl 40 mEq/L infusion   IntraVENous CONTINUOUS    insulin lispro (HUMALOG) injection   SubCUTAneous Q6H    glucose chewable tablet 16 g  4 Tablet Oral PRN    glucagon (GLUCAGEN) injection 1 mg  1 mg IntraMUSCular PRN    dextrose 10% infusion 0-250 mL  0-250 mL IntraVENous PRN    labetaloL (NORMODYNE;TRANDATE) 20 mg/4 mL (5 mg/mL) injection 10 mg  10 mg IntraVENous Q4H PRN    sodium chloride (NS) flush 5-40 mL  5-40 mL IntraVENous Q8H    sodium chloride (NS) flush 5-40 mL  5-40 mL IntraVENous PRN    acetaminophen (TYLENOL) tablet 650 mg  650 mg Oral Q6H PRN    Or    acetaminophen (TYLENOL) suppository 650 mg  650 mg Rectal Q6H PRN    polyethylene glycol (MIRALAX) packet 17 g  17 g Oral DAILY PRN    ondansetron (ZOFRAN ODT) tablet 4 mg  4 mg Oral Q8H PRN    Or    ondansetron (ZOFRAN) injection 4 mg  4 mg IntraVENous Q6H PRN    enoxaparin (LOVENOX) injection 40 mg  40 mg SubCUTAneous DAILY    haloperidol lactate (HALDOL) injection 2 mg  2 mg IntraMUSCular Q4H PRN    insulin glargine (LANTUS) injection 14 Units  14 Units SubCUTAneous DAILY    fentaNYL citrate (PF) injection    PRN    midazolam (PF) (VERSED) 1 mg/mL injection    PRN       Patient Vitals for the past 24 hrs:   Temp Pulse Resp BP SpO2   06/08/22 0144 37.1 °C (98.8 °F) 89 16 (!) 162/83 100 %   06/07/22 2006 37.4 °C (99.3 °F) 92 16 (!) 167/85 99 %   06/07/22 1552 36.8 °C (98.3 °F) 94 14 (!) 161/86 99 %       Lab Results   Component Value Date/Time    WBC 7.8 06/07/2022 08:28 AM    HGB 10.2 (L) 06/07/2022 08:28 AM    HCT 32.1 (L) 06/07/2022 08:28 AM    PLATELET 897 47/80/0818 08:28 AM    MCV 86.8 06/07/2022 08:28 AM     Lab Results   Component Value Date/Time    Sodium 143 06/07/2022 08:28 AM    Potassium 3.0 (L) 06/07/2022 08:28 AM    Chloride 111 (H) 06/07/2022 08:28 AM    CO2 25 06/07/2022 08:28 AM    Anion gap 7 06/07/2022 08:28 AM    Glucose 98 06/07/2022 08:28 AM    BUN 12 06/07/2022 08:28 AM    Creatinine 0.93 06/07/2022 08:28 AM    BUN/Creatinine ratio 13 06/07/2022 08:28 AM    GFR est AA >60 06/07/2022 08:28 AM    GFR est non-AA >60 06/07/2022 08:28 AM    Calcium 8.7 06/07/2022 08:28 AM     No results found for: APTT, PTP, INR, INREXT  Lab Results   Component Value Date/Time    Glucose 98 06/07/2022 08:28 AM    Glucose (POC) 81 06/08/2022 05:23 AM     Physical Exam    Airway    TM Distance: 4 - 6 cm  Neck ROM: normal range of motion       Comments: Unable to examine Cardiovascular    Rhythm: regular  Rate: normal         Dental         Pulmonary  Breath sounds clear to auscultation               Abdominal  GI exam deferred       Other Findings            Anesthetic Plan    ASA: 3  Anesthesia type: general          Induction: Intravenous  Anesthetic plan and risks discussed with: Patient and Family

## 2022-06-08 NOTE — PROGRESS NOTES
Hospitalist Progress Note    Subjective:   Daily Progress Note: 6/8/2022 9:06 AM    Patient confused but resting comfortably. NG tube draining bilious drainage.     Current Facility-Administered Medications   Medication Dose Route Frequency    zinc oxide-white petrolatum 20-75 % topical paste   Topical BID    collagenase (SANTYL) 250 unit/gram ointment   Topical DAILY    piperacillin-tazobactam (ZOSYN) 3.375 g in 0.9% sodium chloride (MBP/ADV) 100 mL MBP  3.375 g IntraVENous Q8H    0.9% sodium chloride with KCl 40 mEq/L infusion   IntraVENous CONTINUOUS    insulin lispro (HUMALOG) injection   SubCUTAneous Q6H    glucose chewable tablet 16 g  4 Tablet Oral PRN    glucagon (GLUCAGEN) injection 1 mg  1 mg IntraMUSCular PRN    dextrose 10% infusion 0-250 mL  0-250 mL IntraVENous PRN    labetaloL (NORMODYNE;TRANDATE) 20 mg/4 mL (5 mg/mL) injection 10 mg  10 mg IntraVENous Q4H PRN    sodium chloride (NS) flush 5-40 mL  5-40 mL IntraVENous Q8H    sodium chloride (NS) flush 5-40 mL  5-40 mL IntraVENous PRN    acetaminophen (TYLENOL) tablet 650 mg  650 mg Oral Q6H PRN    Or    acetaminophen (TYLENOL) suppository 650 mg  650 mg Rectal Q6H PRN    polyethylene glycol (MIRALAX) packet 17 g  17 g Oral DAILY PRN    ondansetron (ZOFRAN ODT) tablet 4 mg  4 mg Oral Q8H PRN    Or    ondansetron (ZOFRAN) injection 4 mg  4 mg IntraVENous Q6H PRN    enoxaparin (LOVENOX) injection 40 mg  40 mg SubCUTAneous DAILY    haloperidol lactate (HALDOL) injection 2 mg  2 mg IntraMUSCular Q4H PRN    insulin glargine (LANTUS) injection 14 Units  14 Units SubCUTAneous DAILY    fentaNYL citrate (PF) injection    PRN    midazolam (PF) (VERSED) 1 mg/mL injection    PRN        Review of Systems  Review of Systems   Unable to perform ROS: Acuity of condition            Objective:     Visit Vitals  BP (!) 162/83   Pulse 89   Temp 98.8 °F (37.1 °C)   Resp 16   Ht 5' 10\" (1.778 m)   Wt 83.9 kg (185 lb)   SpO2 100%   BMI 26.54 kg/m² O2 Device: None (Room air)    Temp (24hrs), Av.8 °F (37.1 °C), Min:98.3 °F (36.8 °C), Max:99.3 °F (37.4 °C)      No intake/output data recorded.  1901 -  0700  In: 3637.5 [I.V.:3607.5]  Out: 1450     Recent Results (from the past 24 hour(s))   GLUCOSE, POC    Collection Time: 22 11:33 AM   Result Value Ref Range    Glucose (POC) 93 65 - 117 mg/dL    Performed by Olamide Cecy INDEX    Collection Time: 22  2:59 PM   Result Value Ref Range    Right dorsalis pedis  mmHg    Left dorsalis pedis  mmHg    Left arm  mmHg    Left posterior tibial 167 mmHg    Right posterior tibial 98 mmHg    Left ELVIRA 0.94     Right ELVIRA 0.89     Left toe pressure 111 mmHg    Right toe pressure 118 mmHg    Left TBI 0.63     Right TBI 0.67    GLUCOSE, POC    Collection Time: 22  5:51 PM   Result Value Ref Range    Glucose (POC) 90 65 - 117 mg/dL    Performed by Eliseo Becker    GLUCOSE, POC    Collection Time: 22 12:30 AM   Result Value Ref Range    Glucose (POC) 60 (L) 65 - 117 mg/dL    Performed by Danisha Varela, POC    Collection Time: 22  1:41 AM   Result Value Ref Range    Glucose (POC) 110 65 - 117 mg/dL    Performed by Bennie Sánchez    GLUCOSE, POC    Collection Time: 22  5:23 AM   Result Value Ref Range    Glucose (POC) 81 65 - 117 mg/dL    Performed by Radha JACKSON         XR CHEST PORT   Final Result   NG tube directed toward the distal stomach. No acute cardiopulmonary   finding. ANKLE BRACHIAL INDEX         XR CHEST PORT   Final Result   Partially imaged NG tube coursing below the left hemidiaphragm,   terminating in the stomach. CT ABD PELV WO CONT   Final Result   Persistent sigmoid colon distention. Tapered narrowing for the sigmoid colon through pelvis again noting mesenteric   swirling. Findings again concerning for sigmoid volvulus. Pelvic small bowel loops are oral contrast filled and distended.  (oral contrast   has not reached to the level of the colon at the time of imaging). No free intraperitoneal air. No ascites. Report called to 2E. Spoke with nurse Santiago Small. XR CHEST PORT   Final Result   Negative exam.      XR ABD (KUB)   Final Result   Persistent sigmoid volvulus. XR CHEST PORT   Final Result   Diminished bibasilar atelectasis. XR ABD ACUTE W 1 V CHEST   Final Result   1. Enteric tube insertion. 2. Persistent distention of small and large bowel. 3. Mild bibasilar atelectasis. CT ABD PELV WO CONT   Final Result   Sigmoid volvulus. PHYSICAL EXAM:    Physical Exam  Vitals and nursing note reviewed. HENT:      Head: Normocephalic and atraumatic. Nose:      Comments: NG tube in place  Cardiovascular:      Rate and Rhythm: Normal rate and regular rhythm. Pulmonary:      Effort: No respiratory distress. Breath sounds: No wheezing. Abdominal:      General: Bowel sounds are normal. There is distension. Palpations: Abdomen is soft. Tenderness: There is no abdominal tenderness. Genitourinary:     Comments: Rectal tube in place  Musculoskeletal:      Right lower leg: No edema. Left lower leg: No edema. Skin:     Capillary Refill: Capillary refill takes less than 2 seconds. Comments: Left heel: slough with devitalized tissue with moderate malodor  Sacral wounds   Neurological:      Mental Status: He is alert.       Comments: Patient appears to be interactive with yes/no questions but is difficult to ascertain cognition    Psychiatric:      Comments: Unable to assess          Data Review    Recent Results (from the past 24 hour(s))   GLUCOSE, POC    Collection Time: 06/07/22 11:33 AM   Result Value Ref Range    Glucose (POC) 93 65 - 117 mg/dL    Performed by Kirstin Jensen    Collection Time: 06/07/22  2:59 PM   Result Value Ref Range    Right dorsalis pedis  mmHg    Left dorsalis pedis  mmHg    Left arm BP 177 mmHg    Left posterior tibial 167 mmHg    Right posterior tibial 98 mmHg    Left LEVIRA 0.94     Right ELVIRA 0.89     Left toe pressure 111 mmHg    Right toe pressure 118 mmHg    Left TBI 0.63     Right TBI 0.67    GLUCOSE, POC    Collection Time: 06/07/22  5:51 PM   Result Value Ref Range    Glucose (POC) 90 65 - 117 mg/dL    Performed by Eliseo Becker    GLUCOSE, POC    Collection Time: 06/08/22 12:30 AM   Result Value Ref Range    Glucose (POC) 60 (L) 65 - 117 mg/dL    Performed by Alfred Colón    GLUCOSE, POC    Collection Time: 06/08/22  1:41 AM   Result Value Ref Range    Glucose (POC) 110 65 - 117 mg/dL    Performed by [address] [place] [address] Avenue, POC    Collection Time: 06/08/22  5:23 AM   Result Value Ref Range    Glucose (POC) 81 65 - 117 mg/dL    Performed by Alfred Colón         Assessment/Plan:     Active Problems:    Sigmoid volvulus ([address] (6/4/2022)        Hospital Course:    Kd Espino is a 49-year-old male with a past medical history of schizophrenia, hypertension, DM, and hyperlipidemia who presented with abdominal pain. ED vital signs unremarkable. Initial labs significant for creatinine of 2.02 and lactic of 3. Blood culture pending. CT abdomen/pelvis showed sigmoid volvulus. Patient admitted for further work-up. Patient started on Rocephin. NG tube and rectal tube placed. General surgery and GI consulted. Underwent sigmoidoscopy with decompression on 6/4. KUB showed enteric tube insertion, mild bibasilar atelectasis, persistent distention of small and large bowel. KUB 6/5 showed persistent sigmoid volvulus. CXR negative for acute pulmonary abnormality. Repeat CT abd/pelvis showed persistent sigmoid colon distention tapered narrowing for the sigmoid colon through pelvis again noted mesenteric swirling pelvic small bowel loops filled and distended with oral contrast. Ceftriaxone discontinued and started on Zosyn for empiric coverage. ELVIRA pending.  Exploratory laparotomy with a sigmoid colon resection possible colostomy placement today. Sigmoid volvulus  S/p sigmoidoscopy with decompression on 6/4  NG and rectal tube tube in place  Continue IVF infusion  Repeat CT abd/pelvis showed persistent sigmoid colon distention tapered narrowing for the sigmoid colon through pelvis again noted mesenteric swirling pelvic small bowel loops filled and distended with oral contrast  GI/General surgery following    Left foot ulcer  Afebrile, no elevated WBC, inflammatory markers pending  Continue on Zosyn for empiric coverage  6/4 blood: NGTD, prelim  ELVIRA pending  Wound culture pending  Wound care following    Hypokalemia  Continue on IV supplemenation    Deaf  Noncommunicative, currently calm  Haldol as needed    Diabetes mellitus, type II  A1c pending  Continue on Lantus 14 units and sliding scale insulin as needed    CKD II  Creatinine stable  Monitor    Urinary retention  Monitor urinary output and consider Sosa cath if needed    DVT Prophylaxis: lovenox  Discharge and disposition barriers: surgery, 48 hours    Care Plan discussed with: Patient/Family, Nurse and     Total time spent with patient: 35 minutes.

## 2022-06-09 ENCOUNTER — APPOINTMENT (OUTPATIENT)
Dept: GENERAL RADIOLOGY | Age: 72
DRG: 330 | End: 2022-06-09
Attending: INTERNAL MEDICINE
Payer: MEDICARE

## 2022-06-09 LAB
ALBUMIN SERPL-MCNC: 2.6 G/DL (ref 3.5–5)
ALBUMIN/GLOB SERPL: 0.5 {RATIO} (ref 1.1–2.2)
ALP SERPL-CCNC: 110 U/L (ref 45–117)
ALT SERPL-CCNC: 10 U/L (ref 12–78)
ANION GAP SERPL CALC-SCNC: 11 MMOL/L (ref 5–15)
APPEARANCE UR: CLEAR
AST SERPL W P-5'-P-CCNC: 10 U/L (ref 15–37)
BACTERIA URNS QL MICRO: NEGATIVE /HPF
BASOPHILS # BLD: 0 K/UL (ref 0–0.1)
BASOPHILS NFR BLD: 0 % (ref 0–1)
BILIRUB SERPL-MCNC: 0.5 MG/DL (ref 0.2–1)
BILIRUB UR QL: NEGATIVE
BUN SERPL-MCNC: 10 MG/DL (ref 6–20)
BUN/CREAT SERPL: 9 (ref 12–20)
CA-I BLD-MCNC: 8.8 MG/DL (ref 8.5–10.1)
CHLORIDE SERPL-SCNC: 114 MMOL/L (ref 97–108)
CO2 SERPL-SCNC: 22 MMOL/L (ref 21–32)
COLOR UR: ABNORMAL
CREAT SERPL-MCNC: 1.06 MG/DL (ref 0.7–1.3)
CRP SERPL-MCNC: 16 MG/DL (ref 0–0.6)
DIFFERENTIAL METHOD BLD: ABNORMAL
EOSINOPHIL # BLD: 0 K/UL (ref 0–0.4)
EOSINOPHIL NFR BLD: 0 % (ref 0–7)
ERYTHROCYTE [DISTWIDTH] IN BLOOD BY AUTOMATED COUNT: 15.5 % (ref 11.5–14.5)
GLOBULIN SER CALC-MCNC: 4.8 G/DL (ref 2–4)
GLUCOSE BLD STRIP.AUTO-MCNC: 108 MG/DL (ref 65–117)
GLUCOSE BLD STRIP.AUTO-MCNC: 112 MG/DL (ref 65–117)
GLUCOSE BLD STRIP.AUTO-MCNC: 119 MG/DL (ref 65–117)
GLUCOSE BLD STRIP.AUTO-MCNC: 95 MG/DL (ref 65–117)
GLUCOSE SERPL-MCNC: 121 MG/DL (ref 65–100)
GLUCOSE UR STRIP.AUTO-MCNC: NEGATIVE MG/DL
HCT VFR BLD AUTO: 32.8 % (ref 36.6–50.3)
HGB BLD-MCNC: 10.2 G/DL (ref 12.1–17)
HGB UR QL STRIP: ABNORMAL
IMM GRANULOCYTES # BLD AUTO: 0.1 K/UL (ref 0–0.04)
IMM GRANULOCYTES NFR BLD AUTO: 1 % (ref 0–0.5)
KETONES UR QL STRIP.AUTO: 80 MG/DL
LEFT ABI: 0.94
LEFT ARM BP: 177 MMHG
LEFT POSTERIOR TIBIAL: 167 MMHG
LEFT TBI: 0.63
LEFT TOE PRESSURE: 111 MMHG
LEUKOCYTE ESTERASE UR QL STRIP.AUTO: NEGATIVE
LYMPHOCYTES # BLD: 1.2 K/UL (ref 0.8–3.5)
LYMPHOCYTES NFR BLD: 13 % (ref 12–49)
MCH RBC QN AUTO: 27.1 PG (ref 26–34)
MCHC RBC AUTO-ENTMCNC: 31.1 G/DL (ref 30–36.5)
MCV RBC AUTO: 87 FL (ref 80–99)
MONOCYTES # BLD: 0.9 K/UL (ref 0–1)
MONOCYTES NFR BLD: 9 % (ref 5–13)
NEUTS SEG # BLD: 7.6 K/UL (ref 1.8–8)
NEUTS SEG NFR BLD: 77 % (ref 32–75)
NITRITE UR QL STRIP.AUTO: NEGATIVE
NRBC # BLD: 0 K/UL (ref 0–0.01)
NRBC BLD-RTO: 0 PER 100 WBC
PERFORMED BY, TECHID: ABNORMAL
PERFORMED BY, TECHID: NORMAL
PH UR STRIP: 5 [PH] (ref 5–8)
PLATELET # BLD AUTO: 330 K/UL (ref 150–400)
PMV BLD AUTO: 11 FL (ref 8.9–12.9)
POTASSIUM SERPL-SCNC: 3.6 MMOL/L (ref 3.5–5.1)
PROT SERPL-MCNC: 7.4 G/DL (ref 6.4–8.2)
PROT UR STRIP-MCNC: 100 MG/DL
RBC # BLD AUTO: 3.77 M/UL (ref 4.1–5.7)
RBC #/AREA URNS HPF: ABNORMAL /HPF (ref 0–5)
RIGHT ABI: 0.89
RIGHT POSTERIOR TIBIAL: 98 MMHG
RIGHT TBI: 0.67
RIGHT TOE PRESSURE: 118 MMHG
SODIUM SERPL-SCNC: 147 MMOL/L (ref 136–145)
SP GR UR REFRACTOMETRY: 1.02 (ref 1–1.03)
UROBILINOGEN UR QL STRIP.AUTO: 0.1 EU/DL (ref 0.1–1)
VAS LEFT DORSALIS PEDIS BP: 132 MMHG
VAS RIGHT DORSALIS PEDIS BP: 158 MMHG
WBC # BLD AUTO: 9.8 K/UL (ref 4.1–11.1)
WBC URNS QL MICRO: ABNORMAL /HPF (ref 0–4)

## 2022-06-09 PROCEDURE — 85025 COMPLETE CBC W/AUTO DIFF WBC: CPT

## 2022-06-09 PROCEDURE — 74011000258 HC RX REV CODE- 258: Performed by: STUDENT IN AN ORGANIZED HEALTH CARE EDUCATION/TRAINING PROGRAM

## 2022-06-09 PROCEDURE — 80053 COMPREHEN METABOLIC PANEL: CPT

## 2022-06-09 PROCEDURE — 99024 POSTOP FOLLOW-UP VISIT: CPT | Performed by: SURGERY

## 2022-06-09 PROCEDURE — 74011000250 HC RX REV CODE- 250: Performed by: SURGERY

## 2022-06-09 PROCEDURE — 81001 URINALYSIS AUTO W/SCOPE: CPT

## 2022-06-09 PROCEDURE — 74011636637 HC RX REV CODE- 636/637: Performed by: INTERNAL MEDICINE

## 2022-06-09 PROCEDURE — 86140 C-REACTIVE PROTEIN: CPT

## 2022-06-09 PROCEDURE — 65270000029 HC RM PRIVATE

## 2022-06-09 PROCEDURE — 74011250636 HC RX REV CODE- 250/636: Performed by: SURGERY

## 2022-06-09 PROCEDURE — 74011250636 HC RX REV CODE- 250/636: Performed by: STUDENT IN AN ORGANIZED HEALTH CARE EDUCATION/TRAINING PROGRAM

## 2022-06-09 PROCEDURE — 71045 X-RAY EXAM CHEST 1 VIEW: CPT

## 2022-06-09 PROCEDURE — 74011000250 HC RX REV CODE- 250: Performed by: INTERNAL MEDICINE

## 2022-06-09 PROCEDURE — 82962 GLUCOSE BLOOD TEST: CPT

## 2022-06-09 PROCEDURE — 36415 COLL VENOUS BLD VENIPUNCTURE: CPT

## 2022-06-09 PROCEDURE — 87086 URINE CULTURE/COLONY COUNT: CPT

## 2022-06-09 RX ORDER — DEXTROSE, SODIUM CHLORIDE, SODIUM LACTATE, POTASSIUM CHLORIDE, AND CALCIUM CHLORIDE 5; .6; .31; .03; .02 G/100ML; G/100ML; G/100ML; G/100ML; G/100ML
75 INJECTION, SOLUTION INTRAVENOUS CONTINUOUS
Status: DISCONTINUED | OUTPATIENT
Start: 2022-06-09 | End: 2022-06-11

## 2022-06-09 RX ADMIN — ENOXAPARIN SODIUM 40 MG: 100 INJECTION SUBCUTANEOUS at 10:00

## 2022-06-09 RX ADMIN — PIPERACILLIN AND TAZOBACTAM 3.38 G: 3; .375 INJECTION, POWDER, LYOPHILIZED, FOR SOLUTION INTRAVENOUS at 12:42

## 2022-06-09 RX ADMIN — PIPERACILLIN AND TAZOBACTAM 3.38 G: 3; .375 INJECTION, POWDER, LYOPHILIZED, FOR SOLUTION INTRAVENOUS at 20:48

## 2022-06-09 RX ADMIN — SODIUM CHLORIDE, PRESERVATIVE FREE 10 ML: 5 INJECTION INTRAVENOUS at 05:30

## 2022-06-09 RX ADMIN — COLLAGENASE SANTYL: 250 OINTMENT TOPICAL at 10:01

## 2022-06-09 RX ADMIN — WHITE PETROLATUM,ZINC OXIDE: 20; 75 PASTE TOPICAL at 21:00

## 2022-06-09 RX ADMIN — SODIUM CHLORIDE, PRESERVATIVE FREE 10 ML: 5 INJECTION INTRAVENOUS at 14:00

## 2022-06-09 RX ADMIN — INSULIN GLARGINE 14 UNITS: 100 INJECTION, SOLUTION SUBCUTANEOUS at 10:00

## 2022-06-09 RX ADMIN — SODIUM CHLORIDE, SODIUM LACTATE, POTASSIUM CHLORIDE, CALCIUM CHLORIDE AND DEXTROSE MONOHYDRATE 75 ML/HR: 5; 600; 310; 30; 20 INJECTION, SOLUTION INTRAVENOUS at 13:00

## 2022-06-09 RX ADMIN — WHITE PETROLATUM,ZINC OXIDE: 20; 75 PASTE TOPICAL at 09:00

## 2022-06-09 RX ADMIN — PIPERACILLIN AND TAZOBACTAM 3.38 G: 3; .375 INJECTION, POWDER, LYOPHILIZED, FOR SOLUTION INTRAVENOUS at 05:39

## 2022-06-09 NOTE — PROGRESS NOTES
Patient examined this morning. He looks comfortable. He has NG tube in place. Abdomen soft appropriate tender at  Colostomy is intact and viable. Patient is postop day #1 exploratory laparotomy and the removal of the sigmoid volvulus and end colostomy placement. Labs pending this morning. Continue n.p.o. NG tube until colostomy working. Continue postop course.

## 2022-06-09 NOTE — ANESTHESIA POSTPROCEDURE EVALUATION
Procedure(s):  LAPAROTOMY EXPLORATORY, SIGMOIDECTOMY WITH COLOSTOMY.     general    Anesthesia Post Evaluation        Patient location during evaluation: bedside  Patient participation: complete - patient participated  Level of consciousness: awake  Pain score: 0  Pain management: adequate  Airway patency: patent  Anesthetic complications: no  Cardiovascular status: hemodynamically stable  Respiratory status: spontaneous ventilation  Hydration status: euvolemic  Post anesthesia nausea and vomiting:  controlled  Final Post Anesthesia Temperature Assessment:  Normothermia (36.0-37.5 degrees C)      INITIAL Post-op Vital signs:   Vitals Value Taken Time   /90 06/08/22 1742   Temp 36.7 °C (98 °F) 06/08/22 1715   Pulse 88 06/08/22 1742   Resp 14 06/08/22 1742   SpO2 94 % 06/08/22 1742

## 2022-06-09 NOTE — WOUND CARE
OSTOMY Assessment     Surgery Date:_6/8/2022____     Stoma Tissue Assessment: __X_Post-op ___Follow-up        Type of Diversion: _X__New___ Established ___Revision___Permanent____Temporary     _____  Ileostomy   ___X__  Colostomy: ____ Ascending, ____ Transverse, ____X_. Sigmoid   _____  Black Dross   _____  Ileal Conduit   _____  Mucous Fistul        Pressure Injury Interventions:  Sensory Interventions: Keep linens dry and wrinkle-free,Maintain/enhance activity level,Minimize linen layers,Pressure redistribution bed/mattress (bed type),Turn and reposition approx.  every two hours (pillows and wedges if needed)     Moisture Interventions: Apply protective barrier, creams and emollients,Internal/External urinary devices,Limit adult briefs,Minimize layers,Moisture barrier     Activity Interventions: Pressure redistribution bed/mattress(bed type)     Mobility Interventions: Pressure redistribution bed/mattress (bed type)     Nutrition Interventions: Discuss nutritional consult with provider     Friction and Shear Interventions: HOB 30 degrees or less,Minimize layers,Transferring/repositioning devices                 Appearance of Ostomy:   X__ Bright Red /Moist/Viable ___ [place] Red ___ [place] ___ Sloughing ___Necrotic____Pallor ____Red  ___Dry ____Moist     Stoma Size: ___~35_____ (mm)       __X_Round ___ Oval ___Irregular      Stoma Height:   ____Flush ____Retracted __X__Budded ____Edematous ____Prolapse      Stoma Location  __X__ Left Side          ____Right Side     _____Umbilicus  _____Incision Line  ____ Above Belt       ____ Below Belt     Abdominal Contours  ____ Firm ____ Flat ____Flabby _X__Soft _X__Round ___ Hard ___ Other         Stoma Function: __Yes __No  Output:   ____ Yellow ____Amber ____ Pink(Hematuria) ____ Tea Colored   ____ Clots ____Foul Odor ____ Mucous      Peristomal skin:   _X__ Intact ___ Irritant Dermatitis ___ Allergic Contact Dermatitis ___Candidiasis ___Caput Medusae ___Folliculitis ___Mechanical Trauma ___Mucosal Transplantation    ___Pyoderma Gangrenosum ___Hyperplasia ___Radiation Trauma ___Allergies mpling  ___ Dimpling ____Blistered ____Fragile ____Macerated ___Peeling  ___Ulceration  ___ Fungal ___Peristomal Hernia ___Non-blanchable ___ Hypergranulation        Stoma Complications:   __Excessive Bleeding __Ischemia __ Abscess __Necrosis __Prolapse   __Hernia __ Retraction __Stenosis __Mucosal Separation __Melanosis Coli   __Laceration __Other      Application for Patient- patient post op  Day 1,  Currently has a coloplast  1 piece pouch on,  No leaks noticed at this time. Small amount of blood in ostomy bag. Education:      Pouch System Recommended:   _X_One-Piece _X_Two-Piece ___Custom      Flat:   ___Pre-cut   _X__Cut-to fit      Convexity: _X__Shallow ___Deep__X_ Flexible ___Creative Convexity   ___Pre-cut _X__Cut to [employer]      Accessory Products   _X_ Adhesive Seals _X_Adhesive Remover Wipes _X_Barrier Wafer _X_Barrier Wipes   __Deodorizer __Liquid Adhesive _X_ Paste _X_ Powder _X_Strip   _X_ Support Belt __Tape     Appliance in place, no leaks noticed at this time, small amount of blood in bag. Patient is non-verbal and deaf and is a resident [facility]. Patient not a candidate for education. Will continue to follow and leave education in room for family, family can call N at ext: [phone] for any questions.     Recommended appliances:    -Coloplast Apollo Convex flip: [id number] (1 piece)    -Coloplast Sensura [place] one-piece: [id number]    -Coloplast  Sensura Apollo flex wafer: [id number]    with the [address]: [id number]

## 2022-06-09 NOTE — PROGRESS NOTES
Comprehensive Nutrition Assessment    Type and Reason for Visit: Consult (poor appetite/intake)    Nutrition Recommendations/Plan:   Continue NPO, advance diet as medically able  Consider dextrose containing IVF for protein sparing kcals  Monitor and record intakes and Bms in I/Os     Malnutrition Assessment:  Malnutrition Status:  Mild malnutrition (06/09/22 1112)    Context:  Acute illness     Findings of the 6 clinical characteristics of malnutrition:   Energy Intake:  50% or less of est energy requirements for 5 or more days  Weight Loss:  No significant weight loss     Body Fat Loss:  No significant body fat loss,     Muscle Mass Loss:  No significant muscle mass loss,    Fluid Accumulation:  No significant fluid accumulation,     Strength:  Not performed     Nutrition Assessment:    (P) Admitted for abdominal distention/vomiting. Underwent ex lap with partial sigmoid colon resection, colostomy. +NGT for suctioning, pt frequently removes. Per MD PT NPO until colostomy functioning. Consulted for poor appetite/intake x5 days- NPO d/t medical status. No weight loss per chart review. Labs: Na 147, K 3.6, BUN 10, Creat 1.06, Gluc 121, Alb 2.6. Meds: insulin glargine, insulin lispro, ondansetron. Nutrition Related Findings:    (P) Nourished per NFPE. No n/v, d/c, or problems chewing/swallowing. 2+ b/l LE edema. BM 6/8. Wound Type: (P) Moisture associate skin damage,Diabetic ulcer    Current Nutrition Intake & Therapies:  Average Meal Intake: (P) NPO  Average Supplement Intake: (P) NPO  DIET NPO    Anthropometric Measures:  Height: (P) 5' 10\" (177.8 cm)  Ideal Body Weight (IBW): (P) 166 lbs ((P) 75 kg)  Current Body Wt:  (P) 83.9 kg (184 lb 15.5 oz), (P) 111.4 % IBW.  (P) Stated  Current BMI (kg/m2): (P) 26.5    Estimated Daily Nutrient Needs:  Energy Requirements Based On: (P) Kcal/kg  Weight Used for Energy Requirements: (P) Current  Energy (kcal/day): (P) 2098kcal (25kcal/kg)  Weight Used for Protein Requirements: (P) Current  Protein (g/day): (P) 84g (1g.kg)  Method Used for Fluid Requirements: (P) 1 ml/kcal  Fluid (ml/day): (P) 2098mL (1mL/kcal)    Nutrition Diagnosis:   Inadequate protein-energy intake related to altered GI structure as evidenced by NPO or clear liquid status due to medical condition    Nutrition Interventions:   Food and/or Nutrient Delivery: Continue NPO  Nutrition Education/Counseling: Education not indicated  Coordination of Nutrition Care: Continue to monitor while inpatient,Feeding assistance/environmental change,Speech therapy    Goals:  Goals: PO intake 50% or greater,by next RD assessment    Nutrition Monitoring and Evaluation:   Behavioral-Environmental Outcomes: None identified  Food/Nutrient Intake Outcomes: Diet advancement/tolerance,Food and nutrient intake  Physical Signs/Symptoms Outcomes: GI status,Meal time behavior,Weight    Discharge Planning:     Too soon to determine    Shikha Mederos RD  Contact: [phone]

## 2022-06-09 NOTE — PROGRESS NOTES
Hospitalist Progress Note    Subjective:   Daily Progress Note: 6/9/2022 9:06 AM    Patient confused but resting comfortably. NG tube draining brown red drainage.     Current Facility-Administered Medications   Medication Dose Route Frequency    sodium chloride (NS) flush 5-40 mL  5-40 mL IntraVENous Q8H    sodium chloride (NS) flush 5-40 mL  5-40 mL IntraVENous PRN    ondansetron (ZOFRAN ODT) tablet 4 mg  4 mg Oral Q8H PRN    Or    ondansetron (ZOFRAN) injection 4 mg  4 mg IntraVENous Q6H PRN    enoxaparin (LOVENOX) injection 40 mg  40 mg SubCUTAneous DAILY    0.9% sodium chloride infusion  125 mL/hr IntraVENous CONTINUOUS    HYDROmorphone (DILAUDID) syringe 0.25 mg  0.25 mg IntraVENous Q3H PRN    Or    HYDROmorphone (DILAUDID) injection 1 mg  1 mg IntraVENous Q4H PRN    zinc oxide-white petrolatum 20-75 % topical paste   Topical BID    collagenase (SANTYL) 250 unit/gram ointment   Topical DAILY    piperacillin-tazobactam (ZOSYN) 3.375 g in 0.9% sodium chloride (MBP/ADV) 100 mL MBP  3.375 g IntraVENous Q8H    0.9% sodium chloride with KCl 40 mEq/L infusion   IntraVENous CONTINUOUS    insulin lispro (HUMALOG) injection   SubCUTAneous Q6H    glucose chewable tablet 16 g  4 Tablet Oral PRN    glucagon (GLUCAGEN) injection 1 mg  1 mg IntraMUSCular PRN    dextrose 10% infusion 0-250 mL  0-250 mL IntraVENous PRN    labetaloL (NORMODYNE;TRANDATE) 20 mg/4 mL (5 mg/mL) injection 10 mg  10 mg IntraVENous Q4H PRN    sodium chloride (NS) flush 5-40 mL  5-40 mL IntraVENous Q8H    sodium chloride (NS) flush 5-40 mL  5-40 mL IntraVENous PRN    acetaminophen (TYLENOL) tablet 650 mg  650 mg Oral Q6H PRN    Or    acetaminophen (TYLENOL) suppository 650 mg  650 mg Rectal Q6H PRN    polyethylene glycol (MIRALAX) packet 17 g  17 g Oral DAILY PRN    ondansetron (ZOFRAN ODT) tablet 4 mg  4 mg Oral Q8H PRN    Or    ondansetron (ZOFRAN) injection 4 mg  4 mg IntraVENous Q6H PRN    haloperidol lactate (HALDOL) injection 2 mg  2 mg IntraMUSCular Q4H PRN    insulin glargine (LANTUS) injection 14 Units  14 Units SubCUTAneous DAILY        Review of Systems  Review of Systems   Unable to perform ROS: Acuity of condition            Objective:     Visit Vitals  BP (!) 182/82 (BP 1 Location: Right lower arm, BP Patient Position: Semi fowlers)   Pulse 93   Temp 99 °F (37.2 °C)   Resp 16   Ht 5' 10\" (1.778 m)   Wt 83.9 kg (185 lb)   SpO2 99%   BMI 26.54 kg/m²      O2 Device: None (Room air)    Temp (24hrs), Av.6 °F (37 °C), Min:97.3 °F (36.3 °C), Max:99.5 °F (37.5 °C)      No intake/output data recorded.  1901 -  0700  In: 3552.5 [I.V.:3552.5]  Out: 5959 [Urine:800; Drains:40]    Recent Results (from the past 24 hour(s))   GLUCOSE, POC    Collection Time: 22  9:57 AM   Result Value Ref Range    Glucose (POC) 77 65 - 117 mg/dL    Performed by Sandra Pena    METABOLIC PANEL, COMPREHENSIVE    Collection Time: 22 10:57 AM   Result Value Ref Range    Sodium 146 (H) 136 - 145 mmol/L    Potassium 2.9 (L) 3.5 - 5.1 mmol/L    Chloride 115 (H) 97 - 108 mmol/L    CO2 24 21 - 32 mmol/L    Anion gap 7 5 - 15 mmol/L    Glucose 71 65 - 100 mg/dL    BUN 9 6 - 20 mg/dL    Creatinine 0.88 0.70 - 1.30 mg/dL    BUN/Creatinine ratio 10 (L) 12 - 20      GFR est AA >60 >60 ml/min/1.73m2    GFR est non-AA >60 >60 ml/min/1.73m2    Calcium 8.5 8.5 - 10.1 mg/dL    Bilirubin, total 0.4 0.2 - 1.0 mg/dL    AST (SGOT) 11 (L) 15 - 37 U/L    ALT (SGPT) 9 (L) 12 - 78 U/L    Alk.  phosphatase 114 45 - 117 U/L    Protein, total 7.3 6.4 - 8.2 g/dL    Albumin 2.7 (L) 3.5 - 5.0 g/dL    Globulin 4.6 (H) 2.0 - 4.0 g/dL    A-G Ratio 0.6 (L) 1.1 - 2.2     CBC WITH AUTOMATED DIFF    Collection Time: 22 10:57 AM   Result Value Ref Range    WBC 7.3 4.1 - 11.1 K/uL    RBC 3.46 (L) 4.10 - 5.70 M/uL    HGB 9.3 (L) 12.1 - 17.0 g/dL    HCT 29.9 (L) 36.6 - 50.3 %    MCV 86.4 80.0 - 99.0 FL    MCH 26.9 26.0 - 34.0 PG    MCHC 31.1 30.0 - 36.5 g/dL RDW 15.2 (H) 11.5 - 14.5 %    PLATELET 377 785 - 249 K/uL    MPV 11.0 8.9 - 12.9 FL    NRBC 0.0 0.0  WBC    ABSOLUTE NRBC 0.00 0.00 - 0.01 K/uL    NEUTROPHILS 69 32 - 75 %    LYMPHOCYTES 17 12 - 49 %    MONOCYTES 11 5 - 13 %    EOSINOPHILS 2 0 - 7 %    BASOPHILS 0 0 - 1 %    IMMATURE GRANULOCYTES 1 (H) 0 - 0.5 %    ABS. NEUTROPHILS 5.0 1.8 - 8.0 K/UL    ABS. LYMPHOCYTES 1.3 0.8 - 3.5 K/UL    ABS. MONOCYTES 0.8 0.0 - 1.0 K/UL    ABS. EOSINOPHILS 0.1 0.0 - 0.4 K/UL    ABS. BASOPHILS 0.0 0.0 - 0.1 K/UL    ABS. IMM. GRANS. 0.0 0.00 - 0.04 K/UL    DF AUTOMATED     C REACTIVE PROTEIN, QT    Collection Time: 06/08/22 10:57 AM   Result Value Ref Range    C-Reactive protein 10.70 (H) 0.00 - 0.60 mg/dL   GLUCOSE, POC    Collection Time: 06/08/22  2:33 PM   Result Value Ref Range    Glucose (POC) 73 65 - 117 mg/dL    Performed by Nelly Ferrara    GLUCOSE, POC    Collection Time: 06/08/22  5:53 PM   Result Value Ref Range    Glucose (POC) 82 65 - 117 mg/dL    Performed by Nelly Ferrara    GLUCOSE, POC    Collection Time: 06/09/22 12:17 AM   Result Value Ref Range    Glucose (POC) 95 65 - 117 mg/dL    Performed by Edmar Padilla    URINALYSIS W/MICROSCOPIC    Collection Time: 06/09/22  6:00 AM   Result Value Ref Range    Color Yellow/Straw      Appearance Clear Clear      Specific gravity 1.023 1.003 - 1.030      pH (UA) 5.0 5.0 - 8.0      Protein 100 (A) Negative mg/dL    Glucose Negative Negative mg/dL    Ketone 80 (A) Negative mg/dL    Bilirubin Negative Negative      Blood Small (A) Negative      Urobilinogen 0.1 0.1 - 1.0 EU/dL    Nitrites Negative Negative      Leukocyte Esterase Negative Negative      WBC 0-4 0 - 4 /hpf    RBC 0-5 0 - 5 /hpf    Bacteria Negative Negative /hpf   GLUCOSE, POC    Collection Time: 06/09/22  6:50 AM   Result Value Ref Range    Glucose (POC) 108 65 - 117 mg/dL    Performed by PlateJoyTHIA         XR CHEST SNGL V   Final Result   1. The nasogastric tube is in satisfactory position. 2.  There is mild hypoventilation and minimal atelectasis within the lung bases. The lungs are otherwise clear   3. This examination is negative for new pulmonary pathology or additional   interval changes. XR CHEST PORT   Final Result   NG tube directed toward the distal stomach. No acute cardiopulmonary   finding. ANKLE BRACHIAL INDEX   Final Result      XR CHEST PORT   Final Result   Partially imaged NG tube coursing below the left hemidiaphragm,   terminating in the stomach. CT ABD PELV WO CONT   Final Result   Persistent sigmoid colon distention. Tapered narrowing for the sigmoid colon through pelvis again noting mesenteric   swirling. Findings again concerning for sigmoid volvulus. Pelvic small bowel loops are oral contrast filled and distended. (oral contrast   has not reached to the level of the colon at the time of imaging). No free intraperitoneal air. No ascites. Report called to 2E. Spoke with nurse Amy Calzada. XR CHEST PORT   Final Result   Negative exam.      XR ABD (KUB)   Final Result   Persistent sigmoid volvulus. XR CHEST PORT   Final Result   Diminished bibasilar atelectasis. XR ABD ACUTE W 1 V CHEST   Final Result   1. Enteric tube insertion. 2. Persistent distention of small and large bowel. 3. Mild bibasilar atelectasis. CT ABD PELV WO CONT   Final Result   Sigmoid volvulus. PHYSICAL EXAM:    Physical Exam  Vitals and nursing note reviewed. HENT:      Head: Normocephalic and atraumatic. Nose:      Comments: NG tube draining brown red drainage  Cardiovascular:      Rate and Rhythm: Normal rate and regular rhythm. Pulmonary:      Effort: No respiratory distress. Breath sounds: No wheezing. Abdominal:      General: Bowel sounds are normal. There is distension. Palpations: Abdomen is soft. Tenderness: There is no abdominal tenderness.           Comments: Vertical incision in epigastric area dressing clean, dry, and intact with CELESTINO drain in place in RLQ  Colostomy present in LLQ     Genitourinary:     Comments: Sosa present  Musculoskeletal:      Right lower leg: No edema. Left lower leg: No edema. Skin:     Capillary Refill: Capillary refill takes less than 2 seconds. Comments: Left heel: slough with devitalized tissue with moderate malodor  Sacral wounds   Neurological:      Mental Status: He is alert. Comments: Patient appears to be interactive with yes/no questions but is difficult to ascertain cognition    Psychiatric:      Comments: Unable to assess          Data Review    Recent Results (from the past 24 hour(s))   GLUCOSE, POC    Collection Time: 06/08/22  9:57 AM   Result Value Ref Range    Glucose (POC) 77 65 - 117 mg/dL    Performed by Anita Gresham    METABOLIC PANEL, COMPREHENSIVE    Collection Time: 06/08/22 10:57 AM   Result Value Ref Range    Sodium 146 (H) 136 - 145 mmol/L    Potassium 2.9 (L) 3.5 - 5.1 mmol/L    Chloride 115 (H) 97 - 108 mmol/L    CO2 24 21 - 32 mmol/L    Anion gap 7 5 - 15 mmol/L    Glucose 71 65 - 100 mg/dL    BUN 9 6 - 20 mg/dL    Creatinine 0.88 0.70 - 1.30 mg/dL    BUN/Creatinine ratio 10 (L) 12 - 20      GFR est AA >60 >60 ml/min/1.73m2    GFR est non-AA >60 >60 ml/min/1.73m2    Calcium 8.5 8.5 - 10.1 mg/dL    Bilirubin, total 0.4 0.2 - 1.0 mg/dL    AST (SGOT) 11 (L) 15 - 37 U/L    ALT (SGPT) 9 (L) 12 - 78 U/L    Alk.  phosphatase 114 45 - 117 U/L    Protein, total 7.3 6.4 - 8.2 g/dL    Albumin 2.7 (L) 3.5 - 5.0 g/dL    Globulin 4.6 (H) 2.0 - 4.0 g/dL    A-G Ratio 0.6 (L) 1.1 - 2.2     CBC WITH AUTOMATED DIFF    Collection Time: 06/08/22 10:57 AM   Result Value Ref Range    WBC 7.3 4.1 - 11.1 K/uL    RBC 3.46 (L) 4.10 - 5.70 M/uL    HGB 9.3 (L) 12.1 - 17.0 g/dL    HCT 29.9 (L) 36.6 - 50.3 %    MCV 86.4 80.0 - 99.0 FL    MCH 26.9 26.0 - 34.0 PG    MCHC 31.1 30.0 - 36.5 g/dL    RDW 15.2 (H) 11.5 - 14.5 %    PLATELET 828 711 - 882 K/uL    MPV 11.0 8.9 - 12.9 FL NRBC 0.0 0.0  WBC    ABSOLUTE NRBC 0.00 0.00 - 0.01 K/uL    NEUTROPHILS 69 32 - 75 %    LYMPHOCYTES 17 12 - 49 %    MONOCYTES 11 5 - 13 %    EOSINOPHILS 2 0 - 7 %    BASOPHILS 0 0 - 1 %    IMMATURE GRANULOCYTES 1 (H) 0 - 0.5 %    ABS. NEUTROPHILS 5.0 1.8 - 8.0 K/UL    ABS. LYMPHOCYTES 1.3 0.8 - 3.5 K/UL    ABS. MONOCYTES 0.8 0.0 - 1.0 K/UL    ABS. EOSINOPHILS 0.1 0.0 - 0.4 K/UL    ABS. BASOPHILS 0.0 0.0 - 0.1 K/UL    ABS. IMM.  GRANS. 0.0 0.00 - 0.04 K/UL    DF AUTOMATED     C REACTIVE PROTEIN, QT    Collection Time: 06/08/22 10:57 AM   Result Value Ref Range    C-Reactive protein 10.70 (H) 0.00 - 0.60 mg/dL   GLUCOSE, POC    Collection Time: 06/08/22  2:33 PM   Result Value Ref Range    Glucose (POC) 73 65 - 117 mg/dL    Performed by Scott Hartman    GLUCOSE, POC    Collection Time: 06/08/22  5:53 PM   Result Value Ref Range    Glucose (POC) 82 65 - 117 mg/dL    Performed by Scott Hartman    GLUCOSE, POC    Collection Time: 06/09/22 12:17 AM   Result Value Ref Range    Glucose (POC) 95 65 - 117 mg/dL    Performed by [facility]    URINALYSIS W/MICROSCOPIC    Collection Time: 06/09/22  6:00 AM   Result Value Ref Range    Color Yellow/Straw      Appearance Clear Clear      Specific gravity 1.023 1.003 - 1.030      pH (UA) 5.0 5.0 - 8.0      Protein 100 (A) Negative mg/dL    Glucose Negative Negative mg/dL    Ketone 80 (A) Negative mg/dL    Bilirubin Negative Negative      Blood Small (A) Negative      Urobilinogen 0.1 0.1 - 1.0 EU/dL    Nitrites Negative Negative      Leukocyte Esterase Negative Negative      WBC 0-4 0 - 4 /hpf    RBC 0-5 0 - 5 /hpf    Bacteria Negative Negative /hpf   GLUCOSE, POC    Collection Time: 06/09/22  6:50 AM   Result Value Ref Range    Glucose (POC) 108 65 - 117 mg/dL    Performed by Nkechi ROPER         Assessment/Plan:     Active Problems:    Sigmoid volvulus ([address] (6/4/2022)        Hospital Course:    Lakesha Aiken is a 68-year-old male with a past medical history of schizophrenia, hypertension, DM, and hyperlipidemia who presented with abdominal pain. ED vital signs unremarkable. Initial labs significant for creatinine of 2.02 and lactic of 3. Blood culture pending. CT abdomen/pelvis showed sigmoid volvulus. Patient admitted for further work-up. Patient started on Rocephin. NG tube and rectal tube placed. General surgery and GI consulted. Underwent sigmoidoscopy with decompression on 6/4. KUB showed enteric tube insertion, mild bibasilar atelectasis, persistent distention of small and large bowel. KUB 6/5 showed persistent sigmoid volvulus. CXR negative for acute pulmonary abnormality. Repeat CT abd/pelvis showed persistent sigmoid colon distention tapered narrowing for the sigmoid colon through pelvis again noted mesenteric swirling pelvic small bowel loops filled and distended with oral contrast. Ceftriaxone discontinued and started on Zosyn for empiric coverage. ELVIRA showed mild PAD of bilateral LE. Exploratory laparotomy with a sigmoid colon resection and colostomy placement performed on 6/8. NG to remain in place until colostomy is functioning. SLP and nutrition consulted.     Sigmoid volvulus  S/p sigmoidoscopy with decompression on 6/4  Exploratory laparotomy with a sigmoid colon resection and colostomy placement performed on 6/8  NG in place until colostomy functioning  Continue IVF infusion  GI/General surgery following    Left foot ulcer  Afebrile, no elevated WBC, inflammatory markers pending  Continue on Zosyn for empiric coverage  6/4 blood: NGTD, prelim  ELVIRA showed mild PAD of bilateral LE  Wound culture pending  Wound care following    Hypokalemia  Continue on IV supplemenation    Deaf  Noncommunicative, currently calm  Haldol as needed    Diabetes mellitus, type II  A1c 7.8  Continue on Lantus 14 units and sliding scale insulin as needed    CKD II  Creatinine stable  Monitor    Urinary retention  UA + hematuria and proteinuria  6/9 urine: pending  Monitor urinary output and consider Sosa cath if needed    DVT Prophylaxis: lovenox  Discharge and disposition barriers: return of bowel function, NG tube in place until then, 48 hours    Ritu SANTOS@ [phone] - updated on patient condition    Care Plan discussed with: Patient/Family, Nurse and     Total time spent with patient: 35 minutes.

## 2022-06-09 NOTE — PROGRESS NOTES
Changing patient; patient pulled out NG tube; new tube reinserted; portable CXR ordered for placement

## 2022-06-09 NOTE — PROGRESS NOTES
Consult received for bedside swallow evaluation. Patient s/p exploratory laparotomy w/ removal of sigmoid volvulus and placement of colostomy by Dr. Shawna Cross. Per Dr. Farhan Dawson note on 6/9/22 at [address] patient to remain NPO w/ NGT until colostomy working. Will hold ST at this time and perform bedside swallow evaluation once clear for PO intake by surgeon.

## 2022-06-09 NOTE — OP NOTES
This is operative report on Gail Waller, patient's YOB: 1950. Date of surgery: June 18, 2022. Diagnosis:  Sigmoid volvulus with medical management failure. Post op diagnosis: Same as above    Procedure:  1. Exploratory laparotomy. 2.  Lysis of additions of  small intestine attached to the large intestine. 3.  Partial sigmoid colon resection of the involved the volvulus and end colostomy placement. Anesthesia: General  Anesthesiologist: Dr. Ramiro Sy  Assistant: Mr. Koenigaiden Hodges  Specimen: Sigmoid colon  Implants: None  EBL: 120 cc:  Complication: None  Fluids and urine output: Please see anesthesia record    Indication for surgery: Mr. Migue Tenorio is currently hospitalized with abdominal distention and CT scan shows sigmoid volvulus. This was initially treated with a rectal tube placement and patient had a repeat CT scan clinically patient is not getting better. So patient and family was discussed about surgical exploration. We talked about rational for the surgery, surgical risks benefits complication. Patient's family has signed surgical consent. Description procedure: Patient was brought to operating table comfortable supine position. Followed by general anesthesia was initiated. Followed by patient's abdomen was prepped usual sterile technique using DuraPrep's followed by sterile drapes were placed usual fashion. At this time timeout was called after confirmation was made procedure commenced. Generous midline incision was made. Fascia was opened up. Upon entering abdomen, large distended redundant sigmoid volvulus was noted. Sigmoid colon was untwisted. And looks like a message area of the sigmoid colon was attached to the small intestine. So lysis of adhesion was performed by detaching small intestine from the mesenteric area of the colon. Followed by using YOEL staple device. Proximal sigmoid colon was divided.   Followed by distal sigmoid colon was divided as well with a YOEL stapler. and mesenteric vessels was ligated with Elsi clamps and divided. Followed by 0 silk sutures was used to ligate the vessels of the mesentery of the sigmoid colon. After hemostasis obtained abdomen was washed out. Colostomy site was tunneled at the left lower quadrant area. Followed by proximal sigmoid colon was not delivered extracorporeally through the colostomy site. Followed by abdominal fascia was closed with 0 PDS running sutures. And skin was closed with a staple devices. Followed by colostomy was matured with 3-0 Vicryl and 3-0 silk sutures. Followed by sterile dressing was applied. Patient tolerated procedure well without any complication. Patient was awakened and extubated in the OR transferred recovery area stable condition.

## 2022-06-10 PROBLEM — R60.9 PERIPHERAL EDEMA: Status: ACTIVE | Noted: 2021-12-06

## 2022-06-10 PROBLEM — N18.2 TYPE 2 DIABETES MELLITUS WITH STAGE 2 CHRONIC KIDNEY DISEASE, WITH LONG-TERM CURRENT USE OF INSULIN (HCC): Status: ACTIVE | Noted: 2022-06-10

## 2022-06-10 PROBLEM — N18.2 STAGE 2 CHRONIC KIDNEY DISEASE: Status: ACTIVE | Noted: 2022-06-10

## 2022-06-10 PROBLEM — E11.22 TYPE 2 DIABETES MELLITUS WITH STAGE 2 CHRONIC KIDNEY DISEASE, WITH LONG-TERM CURRENT USE OF INSULIN (HCC): Status: ACTIVE | Noted: 2022-06-10

## 2022-06-10 PROBLEM — E13.621 FOOT ULCER DUE TO SECONDARY DM (HCC): Status: ACTIVE | Noted: 2022-06-10

## 2022-06-10 PROBLEM — R60.0 PERIPHERAL EDEMA: Status: ACTIVE | Noted: 2021-12-06

## 2022-06-10 PROBLEM — L97.509 FOOT ULCER DUE TO SECONDARY DM (HCC): Status: ACTIVE | Noted: 2022-06-10

## 2022-06-10 PROBLEM — Z79.4 TYPE 2 DIABETES MELLITUS WITH STAGE 2 CHRONIC KIDNEY DISEASE, WITH LONG-TERM CURRENT USE OF INSULIN (HCC): Status: ACTIVE | Noted: 2022-06-10

## 2022-06-10 PROBLEM — N18.9 CHRONIC KIDNEY DISEASE: Status: ACTIVE | Noted: 2021-12-06

## 2022-06-10 PROBLEM — I10 HYPERTENSION: Status: ACTIVE | Noted: 2022-06-10

## 2022-06-10 PROBLEM — E87.6 HYPOKALEMIA: Status: ACTIVE | Noted: 2022-06-10

## 2022-06-10 PROBLEM — I10 ESSENTIAL (PRIMARY) HYPERTENSION: Status: ACTIVE | Noted: 2021-12-06

## 2022-06-10 LAB
ALBUMIN SERPL-MCNC: 2.4 G/DL (ref 3.5–5)
ALBUMIN/GLOB SERPL: 0.5 {RATIO} (ref 1.1–2.2)
ALP SERPL-CCNC: 97 U/L (ref 45–117)
ALT SERPL-CCNC: 9 U/L (ref 12–78)
ANION GAP SERPL CALC-SCNC: 8 MMOL/L (ref 5–15)
AST SERPL W P-5'-P-CCNC: 9 U/L (ref 15–37)
BACTERIA SPEC CULT: NORMAL
BASOPHILS # BLD: 0 K/UL (ref 0–0.1)
BASOPHILS NFR BLD: 0 % (ref 0–1)
BILIRUB SERPL-MCNC: 0.4 MG/DL (ref 0.2–1)
BUN SERPL-MCNC: 6 MG/DL (ref 6–20)
BUN/CREAT SERPL: 7 (ref 12–20)
CA-I BLD-MCNC: 8.8 MG/DL (ref 8.5–10.1)
CHLORIDE SERPL-SCNC: 112 MMOL/L (ref 97–108)
CO2 SERPL-SCNC: 25 MMOL/L (ref 21–32)
CREAT SERPL-MCNC: 0.88 MG/DL (ref 0.7–1.3)
CRP SERPL-MCNC: 20.1 MG/DL (ref 0–0.6)
DIFFERENTIAL METHOD BLD: ABNORMAL
EOSINOPHIL # BLD: 0.1 K/UL (ref 0–0.4)
EOSINOPHIL NFR BLD: 1 % (ref 0–7)
ERYTHROCYTE [DISTWIDTH] IN BLOOD BY AUTOMATED COUNT: 15.4 % (ref 11.5–14.5)
GLOBULIN SER CALC-MCNC: 5.3 G/DL (ref 2–4)
GLUCOSE BLD STRIP.AUTO-MCNC: 144 MG/DL (ref 65–117)
GLUCOSE BLD STRIP.AUTO-MCNC: 156 MG/DL (ref 65–117)
GLUCOSE BLD STRIP.AUTO-MCNC: 168 MG/DL (ref 65–117)
GLUCOSE BLD STRIP.AUTO-MCNC: 170 MG/DL (ref 65–117)
GLUCOSE BLD STRIP.AUTO-MCNC: 179 MG/DL (ref 65–117)
GLUCOSE SERPL-MCNC: 191 MG/DL (ref 65–100)
HCT VFR BLD AUTO: 30.8 % (ref 36.6–50.3)
HGB BLD-MCNC: 9.7 G/DL (ref 12.1–17)
IMM GRANULOCYTES # BLD AUTO: 0.1 K/UL (ref 0–0.04)
IMM GRANULOCYTES NFR BLD AUTO: 1 % (ref 0–0.5)
LYMPHOCYTES # BLD: 1.4 K/UL (ref 0.8–3.5)
LYMPHOCYTES NFR BLD: 14 % (ref 12–49)
MCH RBC QN AUTO: 27.1 PG (ref 26–34)
MCHC RBC AUTO-ENTMCNC: 31.5 G/DL (ref 30–36.5)
MCV RBC AUTO: 86 FL (ref 80–99)
MONOCYTES # BLD: 1 K/UL (ref 0–1)
MONOCYTES NFR BLD: 10 % (ref 5–13)
NEUTS SEG # BLD: 7.7 K/UL (ref 1.8–8)
NEUTS SEG NFR BLD: 74 % (ref 32–75)
NRBC # BLD: 0 K/UL (ref 0–0.01)
NRBC BLD-RTO: 0 PER 100 WBC
PERFORMED BY, TECHID: ABNORMAL
PLATELET # BLD AUTO: 312 K/UL (ref 150–400)
PMV BLD AUTO: 10.7 FL (ref 8.9–12.9)
POTASSIUM SERPL-SCNC: 3 MMOL/L (ref 3.5–5.1)
PROT SERPL-MCNC: 7.7 G/DL (ref 6.4–8.2)
RBC # BLD AUTO: 3.58 M/UL (ref 4.1–5.7)
SODIUM SERPL-SCNC: 145 MMOL/L (ref 136–145)
SPECIAL REQUESTS,SREQ: NORMAL
WBC # BLD AUTO: 10.2 K/UL (ref 4.1–11.1)

## 2022-06-10 PROCEDURE — 74011000258 HC RX REV CODE- 258: Performed by: STUDENT IN AN ORGANIZED HEALTH CARE EDUCATION/TRAINING PROGRAM

## 2022-06-10 PROCEDURE — 74011250636 HC RX REV CODE- 250/636: Performed by: SURGERY

## 2022-06-10 PROCEDURE — 99024 POSTOP FOLLOW-UP VISIT: CPT | Performed by: SURGERY

## 2022-06-10 PROCEDURE — 65270000029 HC RM PRIVATE

## 2022-06-10 PROCEDURE — 86140 C-REACTIVE PROTEIN: CPT

## 2022-06-10 PROCEDURE — 74011000250 HC RX REV CODE- 250: Performed by: SURGERY

## 2022-06-10 PROCEDURE — 36415 COLL VENOUS BLD VENIPUNCTURE: CPT

## 2022-06-10 PROCEDURE — 80053 COMPREHEN METABOLIC PANEL: CPT

## 2022-06-10 PROCEDURE — 74011000250 HC RX REV CODE- 250: Performed by: INTERNAL MEDICINE

## 2022-06-10 PROCEDURE — 74011636637 HC RX REV CODE- 636/637: Performed by: INTERNAL MEDICINE

## 2022-06-10 PROCEDURE — 74011250636 HC RX REV CODE- 250/636: Performed by: STUDENT IN AN ORGANIZED HEALTH CARE EDUCATION/TRAINING PROGRAM

## 2022-06-10 PROCEDURE — 82962 GLUCOSE BLOOD TEST: CPT

## 2022-06-10 PROCEDURE — 85025 COMPLETE CBC W/AUTO DIFF WBC: CPT

## 2022-06-10 RX ORDER — POTASSIUM CHLORIDE 7.45 MG/ML
10 INJECTION INTRAVENOUS
Status: COMPLETED | OUTPATIENT
Start: 2022-06-10 | End: 2022-06-10

## 2022-06-10 RX ADMIN — SODIUM CHLORIDE, PRESERVATIVE FREE 10 ML: 5 INJECTION INTRAVENOUS at 13:27

## 2022-06-10 RX ADMIN — PIPERACILLIN AND TAZOBACTAM 3.38 G: 3; .375 INJECTION, POWDER, LYOPHILIZED, FOR SOLUTION INTRAVENOUS at 06:19

## 2022-06-10 RX ADMIN — PIPERACILLIN AND TAZOBACTAM 3.38 G: 3; .375 INJECTION, POWDER, LYOPHILIZED, FOR SOLUTION INTRAVENOUS at 13:27

## 2022-06-10 RX ADMIN — WHITE PETROLATUM,ZINC OXIDE: 20; 75 PASTE TOPICAL at 09:00

## 2022-06-10 RX ADMIN — LABETALOL HYDROCHLORIDE 10 MG: 5 INJECTION, SOLUTION INTRAVENOUS at 16:12

## 2022-06-10 RX ADMIN — INSULIN GLARGINE 14 UNITS: 100 INJECTION, SOLUTION SUBCUTANEOUS at 09:00

## 2022-06-10 RX ADMIN — POTASSIUM CHLORIDE 10 MEQ: 7.46 INJECTION, SOLUTION INTRAVENOUS at 17:53

## 2022-06-10 RX ADMIN — INSULIN LISPRO 2 UNITS: 100 INJECTION, SOLUTION INTRAVENOUS; SUBCUTANEOUS at 13:26

## 2022-06-10 RX ADMIN — HYDROMORPHONE HYDROCHLORIDE 1 MG: 1 INJECTION, SOLUTION INTRAMUSCULAR; INTRAVENOUS; SUBCUTANEOUS at 16:14

## 2022-06-10 RX ADMIN — COLLAGENASE SANTYL: 250 OINTMENT TOPICAL at 09:57

## 2022-06-10 RX ADMIN — POTASSIUM CHLORIDE 10 MEQ: 7.46 INJECTION, SOLUTION INTRAVENOUS at 18:09

## 2022-06-10 RX ADMIN — SODIUM CHLORIDE, PRESERVATIVE FREE 10 ML: 5 INJECTION INTRAVENOUS at 14:00

## 2022-06-10 RX ADMIN — WHITE PETROLATUM,ZINC OXIDE: 20; 75 PASTE TOPICAL at 21:00

## 2022-06-10 RX ADMIN — SODIUM CHLORIDE, PRESERVATIVE FREE 10 ML: 5 INJECTION INTRAVENOUS at 00:40

## 2022-06-10 RX ADMIN — ENOXAPARIN SODIUM 40 MG: 100 INJECTION SUBCUTANEOUS at 09:57

## 2022-06-10 RX ADMIN — SODIUM CHLORIDE, PRESERVATIVE FREE 10 ML: 5 INJECTION INTRAVENOUS at 06:20

## 2022-06-10 RX ADMIN — INSULIN LISPRO 2 UNITS: 100 INJECTION, SOLUTION INTRAVENOUS; SUBCUTANEOUS at 07:51

## 2022-06-10 RX ADMIN — POTASSIUM CHLORIDE 10 MEQ: 7.46 INJECTION, SOLUTION INTRAVENOUS at 19:42

## 2022-06-10 RX ADMIN — INSULIN LISPRO 2 UNITS: 100 INJECTION, SOLUTION INTRAVENOUS; SUBCUTANEOUS at 23:55

## 2022-06-10 RX ADMIN — PIPERACILLIN AND TAZOBACTAM 3.38 G: 3; .375 INJECTION, POWDER, LYOPHILIZED, FOR SOLUTION INTRAVENOUS at 23:08

## 2022-06-10 RX ADMIN — POTASSIUM CHLORIDE 10 MEQ: 7.46 INJECTION, SOLUTION INTRAVENOUS at 16:10

## 2022-06-10 RX ADMIN — SODIUM CHLORIDE, PRESERVATIVE FREE 10 ML: 5 INJECTION INTRAVENOUS at 23:08

## 2022-06-10 RX ADMIN — INSULIN LISPRO 2 UNITS: 100 INJECTION, SOLUTION INTRAVENOUS; SUBCUTANEOUS at 18:06

## 2022-06-10 RX ADMIN — POTASSIUM CHLORIDE 10 MEQ: 7.46 INJECTION, SOLUTION INTRAVENOUS at 22:50

## 2022-06-10 NOTE — PROGRESS NOTES
CM reviewed chart. Patient currently still NPO, new colostomy, awaiting return of bowel function. Updated clinicals sent via [employer] to [facility] at [facility]. Once medically stable plan is to return back to LTC at [facility]

## 2022-06-10 NOTE — WOUND CARE
WCN in to assess ostomy. Ostomy beefy red and moist,  sanguinous drainage noted in ostomy bag. No leaking noticed at this time, bag and wafer are intact. WCN to continue to monitor and assist with any bagging needs.

## 2022-06-10 NOTE — PROGRESS NOTES
Consult received for bedside swallow evaluation. Patient s/p exploratory laparotomy w/ removal of sigmoid volvulus and placement of colostomy by Dr. Rui Rosa. Per Dr. Gisela Renee note on 6/10/22 at [address] [place] [address] patient to remain NPO w/ NGT until colostomy has output. Will hold ST at this time and perform bedside swallow evaluation once clear for PO intake by surgeon.

## 2022-06-10 NOTE — PROGRESS NOTES
PROGRESS NOTE      Chief Complaints:  No events overnight. HPI and  Objective:    Patient is comfortable NG tube output is minimal.  Patient had temperature 99.4. Patient is nonverbal.    Review of Systems:  Able to assess  EXAM:  Visit Vitals  BP (!) 177/83 (BP 1 Location: Left upper arm, BP Patient Position: At rest)   Pulse 95   Temp 99.4 °F (37.4 °C)   Resp 18   Ht 5' 10\" (1.778 m)   Wt 185 lb (83.9 kg)   SpO2 94%   BMI 26.54 kg/m²       Patient is awake   Head and neck atraumatic, normocephalic. ENT: No hoarse voice  Cardiac system regular rate rhythm. Pulmonary no audible wheeze  Chest wall excursion normal with respiration cycle  Abdomen is soft not particularly distended. Colostomy has no output yet. Incision is clean dry. Neurologically nonfocal.  Skin is warm and moist.  Psychosocial: Cooperative. Vascular examination as previously noted no changes. Recent Results (from the past 24 hour(s))   METABOLIC PANEL, COMPREHENSIVE    Collection Time: 06/09/22  8:12 AM   Result Value Ref Range    Sodium 147 (H) 136 - 145 mmol/L    Potassium 3.6 3.5 - 5.1 mmol/L    Chloride 114 (H) 97 - 108 mmol/L    CO2 22 21 - 32 mmol/L    Anion gap 11 5 - 15 mmol/L    Glucose 121 (H) 65 - 100 mg/dL    BUN 10 6 - 20 mg/dL    Creatinine 1.06 0.70 - 1.30 mg/dL    BUN/Creatinine ratio 9 (L) 12 - 20      GFR est AA >60 >60 ml/min/1.73m2    GFR est non-AA >60 >60 ml/min/1.73m2    Calcium 8.8 8.5 - 10.1 mg/dL    Bilirubin, total 0.5 0.2 - 1.0 mg/dL    AST (SGOT) 10 (L) 15 - 37 U/L    ALT (SGPT) 10 (L) 12 - 78 U/L    Alk.  phosphatase 110 45 - 117 U/L    Protein, total 7.4 6.4 - 8.2 g/dL    Albumin 2.6 (L) 3.5 - 5.0 g/dL    Globulin 4.8 (H) 2.0 - 4.0 g/dL    A-G Ratio 0.5 (L) 1.1 - 2.2     CBC WITH AUTOMATED DIFF    Collection Time: 06/09/22  8:12 AM   Result Value Ref Range    WBC 9.8 4.1 - 11.1 K/uL    RBC 3.77 (L) 4.10 - 5.70 M/uL    HGB 10.2 (L) 12.1 - 17.0 g/dL    HCT 32.8 (L) 36.6 - 50.3 %    MCV 87.0 80.0 - 99.0 FL MCH 27.1 26.0 - 34.0 PG    MCHC 31.1 30.0 - 36.5 g/dL    RDW 15.5 (H) 11.5 - 14.5 %    PLATELET 517 285 - 977 K/uL    MPV 11.0 8.9 - 12.9 FL    NRBC 0.0 0.0  WBC    ABSOLUTE NRBC 0.00 0.00 - 0.01 K/uL    NEUTROPHILS 77 (H) 32 - 75 %    LYMPHOCYTES 13 12 - 49 %    MONOCYTES 9 5 - 13 %    EOSINOPHILS 0 0 - 7 %    BASOPHILS 0 0 - 1 %    IMMATURE GRANULOCYTES 1 (H) 0 - 0.5 %    ABS. NEUTROPHILS 7.6 1.8 - 8.0 K/UL    ABS. LYMPHOCYTES 1.2 0.8 - 3.5 K/UL    ABS. MONOCYTES 0.9 0.0 - 1.0 K/UL    ABS. EOSINOPHILS 0.0 0.0 - 0.4 K/UL    ABS. BASOPHILS 0.0 0.0 - 0.1 K/UL    ABS. IMM. GRANS. 0.1 (H) 0.00 - 0.04 K/UL    DF AUTOMATED     C REACTIVE PROTEIN, QT    Collection Time: 06/09/22  8:12 AM   Result Value Ref Range    C-Reactive protein 16.00 (H) 0.00 - 0.60 mg/dL   GLUCOSE, POC    Collection Time: 06/09/22 11:09 AM   Result Value Ref Range    Glucose (POC) 112 65 - 117 mg/dL    Performed by Gloria Howard    GLUCOSE, POC    Collection Time: 06/09/22  5:18 PM   Result Value Ref Range    Glucose (POC) 119 (H) 65 - 117 mg/dL    Performed by Gloria Howard    GLUCOSE, POC    Collection Time: 06/10/22 12:44 AM   Result Value Ref Range    Glucose (POC) 144 (H) 65 - 117 mg/dL    Performed by DIXON ROPER    GLUCOSE, POC    Collection Time: 06/10/22  6:43 AM   Result Value Ref Range    Glucose (POC) 179 (H) 65 - 117 mg/dL    Performed by Natalie Olivas        ASSESSMENT:   Patient is 66 y.o. with diagnosis of : Active Problems:    Sigmoid volvulus ([address] (6/4/2022)        PLAN:                 Continue n.p.o., NG tube suctioning until colostomy have output. Check electrolytes today. Patient can be out of bed today to chair. Continue postop surgical care.

## 2022-06-10 NOTE — PROGRESS NOTES
Hospitalist Progress Note    Subjective:   Daily Progress Note: 6/10/2022 9:06 AM    Patient with no acute complaints. NG tube draining less brown red drainage. Ostomy with serosanguinous liquid.     Current Facility-Administered Medications   Medication Dose Route Frequency    potassium chloride 10 mEq in 100 ml IVPB  10 mEq IntraVENous Q1H    dextrose 5% lactated ringers infusion  75 mL/hr IntraVENous CONTINUOUS    sodium chloride (NS) flush 5-40 mL  5-40 mL IntraVENous Q8H    sodium chloride (NS) flush 5-40 mL  5-40 mL IntraVENous PRN    ondansetron (ZOFRAN ODT) tablet 4 mg  4 mg Oral Q8H PRN    Or    ondansetron (ZOFRAN) injection 4 mg  4 mg IntraVENous Q6H PRN    enoxaparin (LOVENOX) injection 40 mg  40 mg SubCUTAneous DAILY    HYDROmorphone (DILAUDID) syringe 0.25 mg  0.25 mg IntraVENous Q3H PRN    Or    HYDROmorphone (DILAUDID) injection 1 mg  1 mg IntraVENous Q4H PRN    zinc oxide-white petrolatum 20-75 % topical paste   Topical BID    collagenase (SANTYL) 250 unit/gram ointment   Topical DAILY    piperacillin-tazobactam (ZOSYN) 3.375 g in 0.9% sodium chloride (MBP/ADV) 100 mL MBP  3.375 g IntraVENous Q8H    insulin lispro (HUMALOG) injection   SubCUTAneous Q6H    glucose chewable tablet 16 g  4 Tablet Oral PRN    glucagon (GLUCAGEN) injection 1 mg  1 mg IntraMUSCular PRN    dextrose 10% infusion 0-250 mL  0-250 mL IntraVENous PRN    labetaloL (NORMODYNE;TRANDATE) 20 mg/4 mL (5 mg/mL) injection 10 mg  10 mg IntraVENous Q4H PRN    sodium chloride (NS) flush 5-40 mL  5-40 mL IntraVENous Q8H    sodium chloride (NS) flush 5-40 mL  5-40 mL IntraVENous PRN    acetaminophen (TYLENOL) tablet 650 mg  650 mg Oral Q6H PRN    Or    acetaminophen (TYLENOL) suppository 650 mg  650 mg Rectal Q6H PRN    polyethylene glycol (MIRALAX) packet 17 g  17 g Oral DAILY PRN    ondansetron (ZOFRAN ODT) tablet 4 mg  4 mg Oral Q8H PRN    Or    ondansetron (ZOFRAN) injection 4 mg  4 mg IntraVENous Q6H PRN    haloperidol lactate (HALDOL) injection 2 mg  2 mg IntraMUSCular Q4H PRN    insulin glargine (LANTUS) injection 14 Units  14 Units SubCUTAneous DAILY        Review of Systems  Review of Systems   Unable to perform ROS: Acuity of condition            Objective:     Visit Vitals  BP (!) 177/83 (BP 1 Location: Left upper arm, BP Patient Position: At rest)   Pulse 96   Temp 97.7 °F (36.5 °C)   Resp 20   Ht 5' 10\" (1.778 m)   Wt 83.9 kg (185 lb)   SpO2 99%   BMI 26.54 kg/m²      O2 Device: None (Room air)    Temp (24hrs), Av.1 °F (37.3 °C), Min:97.7 °F (36.5 °C), Max:99.7 °F (37.6 °C)      No intake/output data recorded.  1901 - 06/10 0700  In: 1950 [I.V.:1950]  Out: 1850 [Urine:1550; Drains:50]    Recent Results (from the past 24 hour(s))   GLUCOSE, POC    Collection Time: 22  5:18 PM   Result Value Ref Range    Glucose (POC) 119 (H) 65 - 117 mg/dL    Performed by Truong Manley    GLUCOSE, POC    Collection Time: 06/10/22 12:44 AM   Result Value Ref Range    Glucose (POC) 144 (H) 65 - 117 mg/dL    Performed by [facility]    GLUCOSE, POC    Collection Time: 06/10/22  6:43 AM   Result Value Ref Range    Glucose (POC) 179 (H) 65 - 117 mg/dL    Performed by [facility]    METABOLIC PANEL, COMPREHENSIVE    Collection Time: 06/10/22  7:00 AM   Result Value Ref Range    Sodium 145 136 - 145 mmol/L    Potassium 3.0 (L) 3.5 - 5.1 mmol/L    Chloride 112 (H) 97 - 108 mmol/L    CO2 25 21 - 32 mmol/L    Anion gap 8 5 - 15 mmol/L    Glucose 191 (H) 65 - 100 mg/dL    BUN 6 6 - 20 mg/dL    Creatinine 0.88 0.70 - 1.30 mg/dL    BUN/Creatinine ratio 7 (L) 12 - 20      GFR est AA >60 >60 ml/min/1.73m2    GFR est non-AA >60 >60 ml/min/1.73m2    Calcium 8.8 8.5 - 10.1 mg/dL    Bilirubin, total 0.4 0.2 - 1.0 mg/dL    AST (SGOT) 9 (L) 15 - 37 U/L    ALT (SGPT) 9 (L) 12 - 78 U/L    Alk.  phosphatase 97 45 - 117 U/L    Protein, total 7.7 6.4 - 8.2 g/dL    Albumin 2.4 (L) 3.5 - 5.0 g/dL    Globulin 5.3 (H) 2.0 - 4.0 g/dL A-G Ratio 0.5 (L) 1.1 - 2.2     CBC WITH AUTOMATED DIFF    Collection Time: 06/10/22  7:00 AM   Result Value Ref Range    WBC 10.2 4.1 - 11.1 K/uL    RBC 3.58 (L) 4.10 - 5.70 M/uL    HGB 9.7 (L) 12.1 - 17.0 g/dL    HCT 30.8 (L) 36.6 - 50.3 %    MCV 86.0 80.0 - 99.0 FL    MCH 27.1 26.0 - 34.0 PG    MCHC 31.5 30.0 - 36.5 g/dL    RDW 15.4 (H) 11.5 - 14.5 %    PLATELET 661 073 - 638 K/uL    MPV 10.7 8.9 - 12.9 FL    NRBC 0.0 0.0  WBC    ABSOLUTE NRBC 0.00 0.00 - 0.01 K/uL    NEUTROPHILS 74 32 - 75 %    LYMPHOCYTES 14 12 - 49 %    MONOCYTES 10 5 - 13 %    EOSINOPHILS 1 0 - 7 %    BASOPHILS 0 0 - 1 %    IMMATURE GRANULOCYTES 1 (H) 0 - 0.5 %    ABS. NEUTROPHILS 7.7 1.8 - 8.0 K/UL    ABS. LYMPHOCYTES 1.4 0.8 - 3.5 K/UL    ABS. MONOCYTES 1.0 0.0 - 1.0 K/UL    ABS. EOSINOPHILS 0.1 0.0 - 0.4 K/UL    ABS. BASOPHILS 0.0 0.0 - 0.1 K/UL    ABS. IMM. GRANS. 0.1 (H) 0.00 - 0.04 K/UL    DF AUTOMATED     C REACTIVE PROTEIN, QT    Collection Time: 06/10/22  7:00 AM   Result Value Ref Range    C-Reactive protein 20.10 (H) 0.00 - 0.60 mg/dL   GLUCOSE, POC    Collection Time: 06/10/22 12:37 PM   Result Value Ref Range    Glucose (POC) 168 (H) 65 - 117 mg/dL    Performed by Janelle Tarango         XR CHEST SNGL V   Final Result   1. The nasogastric tube is in satisfactory position. 2.  There is mild hypoventilation and minimal atelectasis within the lung bases. The lungs are otherwise clear   3. This examination is negative for new pulmonary pathology or additional   interval changes. XR CHEST PORT   Final Result   NG tube directed toward the distal stomach. No acute cardiopulmonary   finding. ANKLE BRACHIAL INDEX   Final Result      XR CHEST PORT   Final Result   Partially imaged NG tube coursing below the left hemidiaphragm,   terminating in the stomach. CT ABD PELV WO CONT   Final Result   Persistent sigmoid colon distention.    Tapered narrowing for the sigmoid colon through pelvis again noting mesenteric swirling. Findings again concerning for sigmoid volvulus. Pelvic small bowel loops are oral contrast filled and distended. (oral contrast   has not reached to the level of the colon at the time of imaging). No free intraperitoneal air. No ascites. Report called to 2E. Spoke with nurse Sixto Pearl. XR CHEST PORT   Final Result   Negative exam.      XR ABD (KUB)   Final Result   Persistent sigmoid volvulus. XR CHEST PORT   Final Result   Diminished bibasilar atelectasis. XR ABD ACUTE W 1 V CHEST   Final Result   1. Enteric tube insertion. 2. Persistent distention of small and large bowel. 3. Mild bibasilar atelectasis. CT ABD PELV WO CONT   Final Result   Sigmoid volvulus. PHYSICAL EXAM:    Physical Exam  Vitals and nursing note reviewed. HENT:      Head: Normocephalic and atraumatic. Nose:      Comments: NG tube draining brown red drainage  Cardiovascular:      Rate and Rhythm: Normal rate and regular rhythm. Pulmonary:      Effort: No respiratory distress. Breath sounds: No wheezing. Abdominal:      General: Bowel sounds are normal. There is distension. Palpations: Abdomen is soft. Tenderness: There is no abdominal tenderness. Comments: Vertical incision in epigastric area dressing clean, dry, and intact with CELESTINO drain in place in RLQ  Colostomy present in LLQ with serosanguinous liquid within     Genitourinary:     Comments: Sosa present  Musculoskeletal:      Right lower leg: No edema. Left lower leg: No edema. Skin:     Capillary Refill: Capillary refill takes less than 2 seconds. Comments: Left heel: slough with devitalized tissue with moderate malodor  Sacral wounds   Neurological:      Mental Status: He is alert.       Comments: Patient appears to be interactive with yes/no questions but is difficult to ascertain cognition    Psychiatric:      Comments: Unable to assess          Data Review    Recent Results (from the past 24 hour(s))   GLUCOSE, POC    Collection Time: 06/09/22  5:18 PM   Result Value Ref Range    Glucose (POC) 119 (H) 65 - 117 mg/dL    Performed by Xiomara Kulwinder    GLUCOSE, POC    Collection Time: 06/10/22 12:44 AM   Result Value Ref Range    Glucose (POC) 144 (H) 65 - 117 mg/dL    Performed by [facility]    GLUCOSE, POC    Collection Time: 06/10/22  6:43 AM   Result Value Ref Range    Glucose (POC) 179 (H) 65 - 117 mg/dL    Performed by Artice Asa    METABOLIC PANEL, COMPREHENSIVE    Collection Time: 06/10/22  7:00 AM   Result Value Ref Range    Sodium 145 136 - 145 mmol/L    Potassium 3.0 (L) 3.5 - 5.1 mmol/L    Chloride 112 (H) 97 - 108 mmol/L    CO2 25 21 - 32 mmol/L    Anion gap 8 5 - 15 mmol/L    Glucose 191 (H) 65 - 100 mg/dL    BUN 6 6 - 20 mg/dL    Creatinine 0.88 0.70 - 1.30 mg/dL    BUN/Creatinine ratio 7 (L) 12 - 20      GFR est AA >60 >60 ml/min/1.73m2    GFR est non-AA >60 >60 ml/min/1.73m2    Calcium 8.8 8.5 - 10.1 mg/dL    Bilirubin, total 0.4 0.2 - 1.0 mg/dL    AST (SGOT) 9 (L) 15 - 37 U/L    ALT (SGPT) 9 (L) 12 - 78 U/L    Alk. phosphatase 97 45 - 117 U/L    Protein, total 7.7 6.4 - 8.2 g/dL    Albumin 2.4 (L) 3.5 - 5.0 g/dL    Globulin 5.3 (H) 2.0 - 4.0 g/dL    A-G Ratio 0.5 (L) 1.1 - 2.2     CBC WITH AUTOMATED DIFF    Collection Time: 06/10/22  7:00 AM   Result Value Ref Range    WBC 10.2 4.1 - 11.1 K/uL    RBC 3.58 (L) 4.10 - 5.70 M/uL    HGB 9.7 (L) 12.1 - 17.0 g/dL    HCT 30.8 (L) 36.6 - 50.3 %    MCV 86.0 80.0 - 99.0 FL    MCH 27.1 26.0 - 34.0 PG    MCHC 31.5 30.0 - 36.5 g/dL    RDW 15.4 (H) 11.5 - 14.5 %    PLATELET 988 235 - 821 K/uL    MPV 10.7 8.9 - 12.9 FL    NRBC 0.0 0.0  WBC    ABSOLUTE NRBC 0.00 0.00 - 0.01 K/uL    NEUTROPHILS 74 32 - 75 %    LYMPHOCYTES 14 12 - 49 %    MONOCYTES 10 5 - 13 %    EOSINOPHILS 1 0 - 7 %    BASOPHILS 0 0 - 1 %    IMMATURE GRANULOCYTES 1 (H) 0 - 0.5 %    ABS. NEUTROPHILS 7.7 1.8 - 8.0 K/UL    ABS. LYMPHOCYTES 1.4 0.8 - 3.5 K/UL    ABS. MONOCYTES 1.0 0.0 - 1.0 K/UL    ABS. EOSINOPHILS 0.1 0.0 - 0.4 K/UL    ABS. BASOPHILS 0.0 0.0 - 0.1 K/UL    ABS. IMM. GRANS. 0.1 (H) 0.00 - 0.04 K/UL    DF AUTOMATED     C REACTIVE PROTEIN, QT    Collection Time: 06/10/22  7:00 AM   Result Value Ref Range    C-Reactive protein 20.10 (H) 0.00 - 0.60 mg/dL   GLUCOSE, POC    Collection Time: 06/10/22 12:37 PM   Result Value Ref Range    Glucose (POC) 168 (H) 65 - 117 mg/dL    Performed by Newton Florence         Assessment/Plan:     Active Problems:    Sigmoid volvulus ([address] (6/4/2022)      Type 2 diabetes mellitus with stage 2 chronic kidney disease, with long-term current use of insulin ([address] (6/10/2022)      Hypokalemia (6/10/2022)      Foot ulcer due to secondary DM ([address] (6/10/2022)      Stage 2 chronic kidney disease (6/10/2022)      Hypertension (6/10/2022)        Hospital Course:    Lucina Huggins is a 40-year-old male with a past medical history of schizophrenia, hypertension, DM, and hyperlipidemia who presented with abdominal pain. ED vital signs unremarkable. Initial labs significant for creatinine of 2.02 and lactic of 3. Blood culture pending. CT abdomen/pelvis showed sigmoid volvulus. Patient admitted for further work-up. Patient started on Rocephin. NG tube and rectal tube placed. General surgery and GI consulted. Underwent sigmoidoscopy with decompression on 6/4. KUB showed enteric tube insertion, mild bibasilar atelectasis, persistent distention of small and large bowel. KUB 6/5 showed persistent sigmoid volvulus. CXR negative for acute pulmonary abnormality. Repeat CT abd/pelvis showed persistent sigmoid colon distention tapered narrowing for the sigmoid colon through pelvis again noted mesenteric swirling pelvic small bowel loops filled and distended with oral contrast. Ceftriaxone discontinued and started on Zosyn for empiric coverage. ELVIRA showed mild PAD of bilateral LE.  Exploratory laparotomy with a sigmoid colon resection and colostomy placement performed on 6/8. Urine culture negative. NG to remain in place until colostomy is functioning. SLP and nutrition consulted. Sigmoid volvulus  S/p sigmoidoscopy with decompression on 6/4  Exploratory laparotomy with a sigmoid colon resection and colostomy placement performed on 6/8  NG in place until colostomy functioning  Continue IVF infusion  GI/General surgery following    Diabetic ulcer of the left heel  Afebrile, no elevated WBC, inflammatory markers pending  Continue on Zosyn for empiric coverage  6/4 blood: NGTD, prelim  ELVIRA showed mild PAD of bilateral LE  Wound culture pending  Wound care following    Hypokalemia  Continue on IV supplemenation    Deaf  Noncommunicative, currently calm  Haldol as needed    Diabetes mellitus, type II  A1c 7.8  Continue on Lantus 14 units and sliding scale insulin as needed    CKD II  Creatinine stable  Monitor    Urinary retention  UA + hematuria and proteinuria  6/9 urine: negative  Monitor urinary output and consider Sosa cath if needed    DVT Prophylaxis: lovenox  Discharge and disposition barriers: return of bowel function, NG tube in place until then, 48 hours    Gris Guerrero sister - [phone] - updated on patient condition    Care Plan discussed with: Patient/Family, Nurse and     Total time spent with patient: 35 minutes.
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
## 2022-06-11 LAB
ALBUMIN SERPL-MCNC: 2.2 G/DL (ref 3.5–5)
ALBUMIN/GLOB SERPL: 0.5 {RATIO} (ref 1.1–2.2)
ALP SERPL-CCNC: 85 U/L (ref 45–117)
ALT SERPL-CCNC: 8 U/L (ref 12–78)
ANION GAP SERPL CALC-SCNC: 4 MMOL/L (ref 5–15)
AST SERPL W P-5'-P-CCNC: 7 U/L (ref 15–37)
BACTERIA SPEC CULT: NORMAL
BASOPHILS # BLD: 0 K/UL (ref 0–0.1)
BASOPHILS NFR BLD: 0 % (ref 0–1)
BILIRUB SERPL-MCNC: 0.4 MG/DL (ref 0.2–1)
BUN SERPL-MCNC: 5 MG/DL (ref 6–20)
BUN/CREAT SERPL: 5 (ref 12–20)
CA-I BLD-MCNC: 8.2 MG/DL (ref 8.5–10.1)
CHLORIDE SERPL-SCNC: 111 MMOL/L (ref 97–108)
CO2 SERPL-SCNC: 29 MMOL/L (ref 21–32)
CREAT SERPL-MCNC: 0.91 MG/DL (ref 0.7–1.3)
CRP SERPL-MCNC: 16.4 MG/DL (ref 0–0.6)
DIFFERENTIAL METHOD BLD: ABNORMAL
EOSINOPHIL # BLD: 0.3 K/UL (ref 0–0.4)
EOSINOPHIL NFR BLD: 3 % (ref 0–7)
ERYTHROCYTE [DISTWIDTH] IN BLOOD BY AUTOMATED COUNT: 15.6 % (ref 11.5–14.5)
GLOBULIN SER CALC-MCNC: 4.2 G/DL (ref 2–4)
GLUCOSE BLD STRIP.AUTO-MCNC: 116 MG/DL (ref 65–117)
GLUCOSE BLD STRIP.AUTO-MCNC: 123 MG/DL (ref 65–117)
GLUCOSE BLD STRIP.AUTO-MCNC: 157 MG/DL (ref 65–117)
GLUCOSE BLD STRIP.AUTO-MCNC: 164 MG/DL (ref 65–117)
GLUCOSE SERPL-MCNC: 182 MG/DL (ref 65–100)
HCT VFR BLD AUTO: 29.1 % (ref 36.6–50.3)
HGB BLD-MCNC: 9.2 G/DL (ref 12.1–17)
IMM GRANULOCYTES # BLD AUTO: 0.1 K/UL (ref 0–0.04)
IMM GRANULOCYTES NFR BLD AUTO: 1 % (ref 0–0.5)
LYMPHOCYTES # BLD: 1.5 K/UL (ref 0.8–3.5)
LYMPHOCYTES NFR BLD: 17 % (ref 12–49)
MCH RBC QN AUTO: 27.4 PG (ref 26–34)
MCHC RBC AUTO-ENTMCNC: 31.6 G/DL (ref 30–36.5)
MCV RBC AUTO: 86.6 FL (ref 80–99)
MONOCYTES # BLD: 0.9 K/UL (ref 0–1)
MONOCYTES NFR BLD: 10 % (ref 5–13)
NEUTS SEG # BLD: 6.1 K/UL (ref 1.8–8)
NEUTS SEG NFR BLD: 69 % (ref 32–75)
NRBC # BLD: 0 K/UL (ref 0–0.01)
NRBC BLD-RTO: 0 PER 100 WBC
PERFORMED BY, TECHID: ABNORMAL
PERFORMED BY, TECHID: NORMAL
PLATELET # BLD AUTO: 307 K/UL (ref 150–400)
PMV BLD AUTO: 11.2 FL (ref 8.9–12.9)
POTASSIUM SERPL-SCNC: 2.8 MMOL/L (ref 3.5–5.1)
PROT SERPL-MCNC: 6.4 G/DL (ref 6.4–8.2)
RBC # BLD AUTO: 3.36 M/UL (ref 4.1–5.7)
SODIUM SERPL-SCNC: 144 MMOL/L (ref 136–145)
SPECIAL REQUESTS,SREQ: NORMAL
WBC # BLD AUTO: 9 K/UL (ref 4.1–11.1)

## 2022-06-11 PROCEDURE — 74011250636 HC RX REV CODE- 250/636: Performed by: COLON & RECTAL SURGERY

## 2022-06-11 PROCEDURE — 74011636637 HC RX REV CODE- 636/637: Performed by: INTERNAL MEDICINE

## 2022-06-11 PROCEDURE — 74011000258 HC RX REV CODE- 258: Performed by: STUDENT IN AN ORGANIZED HEALTH CARE EDUCATION/TRAINING PROGRAM

## 2022-06-11 PROCEDURE — 85025 COMPLETE CBC W/AUTO DIFF WBC: CPT

## 2022-06-11 PROCEDURE — 99024 POSTOP FOLLOW-UP VISIT: CPT | Performed by: COLON & RECTAL SURGERY

## 2022-06-11 PROCEDURE — 74011250636 HC RX REV CODE- 250/636: Performed by: STUDENT IN AN ORGANIZED HEALTH CARE EDUCATION/TRAINING PROGRAM

## 2022-06-11 PROCEDURE — 74011000250 HC RX REV CODE- 250: Performed by: SURGERY

## 2022-06-11 PROCEDURE — 82962 GLUCOSE BLOOD TEST: CPT

## 2022-06-11 PROCEDURE — 74011250636 HC RX REV CODE- 250/636: Performed by: SURGERY

## 2022-06-11 PROCEDURE — 86140 C-REACTIVE PROTEIN: CPT

## 2022-06-11 PROCEDURE — 92610 EVALUATE SWALLOWING FUNCTION: CPT

## 2022-06-11 PROCEDURE — 80053 COMPREHEN METABOLIC PANEL: CPT

## 2022-06-11 PROCEDURE — 94762 N-INVAS EAR/PLS OXIMTRY CONT: CPT

## 2022-06-11 PROCEDURE — 65270000029 HC RM PRIVATE

## 2022-06-11 PROCEDURE — 36415 COLL VENOUS BLD VENIPUNCTURE: CPT

## 2022-06-11 RX ORDER — DEXTROSE MONOHYDRATE, SODIUM CHLORIDE, SODIUM LACTATE, POTASSIUM CHLORIDE, CALCIUM CHLORIDE 5; 600; 310; 179; 20 G/100ML; MG/100ML; MG/100ML; MG/100ML; MG/100ML
INJECTION, SOLUTION INTRAVENOUS CONTINUOUS
Status: DISCONTINUED | OUTPATIENT
Start: 2022-06-11 | End: 2022-06-17

## 2022-06-11 RX ORDER — POTASSIUM CHLORIDE 7.45 MG/ML
10 INJECTION INTRAVENOUS
Status: COMPLETED | OUTPATIENT
Start: 2022-06-11 | End: 2022-06-11

## 2022-06-11 RX ORDER — POTASSIUM CHLORIDE 7.45 MG/ML
10 INJECTION INTRAVENOUS
Status: DISCONTINUED | OUTPATIENT
Start: 2022-06-11 | End: 2022-06-11

## 2022-06-11 RX ADMIN — HYDROMORPHONE HYDROCHLORIDE 1 MG: 1 INJECTION, SOLUTION INTRAMUSCULAR; INTRAVENOUS; SUBCUTANEOUS at 22:14

## 2022-06-11 RX ADMIN — PIPERACILLIN AND TAZOBACTAM 3.38 G: 3; .375 INJECTION, POWDER, LYOPHILIZED, FOR SOLUTION INTRAVENOUS at 14:07

## 2022-06-11 RX ADMIN — COLLAGENASE SANTYL: 250 OINTMENT TOPICAL at 08:21

## 2022-06-11 RX ADMIN — WHITE PETROLATUM,ZINC OXIDE: 20; 75 PASTE TOPICAL at 21:00

## 2022-06-11 RX ADMIN — WHITE PETROLATUM,ZINC OXIDE: 20; 75 PASTE TOPICAL at 09:00

## 2022-06-11 RX ADMIN — SODIUM CHLORIDE, PRESERVATIVE FREE 10 ML: 5 INJECTION INTRAVENOUS at 06:41

## 2022-06-11 RX ADMIN — POTASSIUM CHLORIDE 10 MEQ: 7.46 INJECTION, SOLUTION INTRAVENOUS at 11:50

## 2022-06-11 RX ADMIN — SODIUM CHLORIDE, PRESERVATIVE FREE 10 ML: 5 INJECTION INTRAVENOUS at 14:09

## 2022-06-11 RX ADMIN — INSULIN GLARGINE 14 UNITS: 100 INJECTION, SOLUTION SUBCUTANEOUS at 08:20

## 2022-06-11 RX ADMIN — ENOXAPARIN SODIUM 40 MG: 100 INJECTION SUBCUTANEOUS at 08:20

## 2022-06-11 RX ADMIN — INSULIN LISPRO 2 UNITS: 100 INJECTION, SOLUTION INTRAVENOUS; SUBCUTANEOUS at 12:16

## 2022-06-11 RX ADMIN — DEXTROSE MONOHYDRATE, SODIUM CHLORIDE, SODIUM LACTATE, POTASSIUM CHLORIDE, CALCIUM CHLORIDE: 5; 600; 310; 179; 20 INJECTION, SOLUTION INTRAVENOUS at 12:15

## 2022-06-11 RX ADMIN — POTASSIUM CHLORIDE 10 MEQ: 7.46 INJECTION, SOLUTION INTRAVENOUS at 12:49

## 2022-06-11 RX ADMIN — PIPERACILLIN AND TAZOBACTAM 3.38 G: 3; .375 INJECTION, POWDER, LYOPHILIZED, FOR SOLUTION INTRAVENOUS at 22:13

## 2022-06-11 RX ADMIN — PIPERACILLIN AND TAZOBACTAM 3.38 G: 3; .375 INJECTION, POWDER, LYOPHILIZED, FOR SOLUTION INTRAVENOUS at 06:35

## 2022-06-11 RX ADMIN — POTASSIUM CHLORIDE 10 MEQ: 7.46 INJECTION, SOLUTION INTRAVENOUS at 14:10

## 2022-06-11 RX ADMIN — SODIUM CHLORIDE, SODIUM LACTATE, POTASSIUM CHLORIDE, CALCIUM CHLORIDE AND DEXTROSE MONOHYDRATE 75 ML/HR: 5; 600; 310; 30; 20 INJECTION, SOLUTION INTRAVENOUS at 03:47

## 2022-06-11 RX ADMIN — POTASSIUM CHLORIDE 10 MEQ: 7.46 INJECTION, SOLUTION INTRAVENOUS at 14:05

## 2022-06-11 NOTE — PROGRESS NOTES
SPEECH LANGUAGE PATHOLOGY BEDSIDE SWALLOW EVALUATION  Patient: Halie Fischer (58 y.o. male)  Date: 6/11/2022  Primary Diagnosis: Sigmoid volvulus ([facility] [address] [K56.2]  Procedure(s) (LRB):  LAPAROTOMY EXPLORATORY, SIGMOIDECTOMY WITH COLOSTOMY (N/A) 3 Days Post-Op   Precautions: Aspiration      ASSESSMENT :  Based on the objective data described below, the patient presents with mild pharyngeal dysphagia. Patient with appropriate bolus acceptance and timely and efficient mastication and A-P transit. Min oral residue, which is cleared with liquid wash. Pharyngeal phase c/b mildly delayed swallow initiation and occasional audible swallow. HLE adequate to palpation. Overt s/s of pen/aspiration c/b throat clear following 3 consecutive cup sips thin liquid. Patient tolerated ice chips, single and additional consecutive cup sips thin liquid, puree, SBS, and limited trials regular with no overt s/s of pen/aspiration. Patient receptive to min cues to take single cup sips. Patient will benefit from skilled intervention to address the above impairments. Patients rehabilitation potential is considered to be Good     PLAN :  Recommendations and Planned Interventions:  Recommend initiate EC, thin liquid diet. STRICT aspiration and GERD precautions, monitor pt closely for s/s aspiration, meds crushed if able in puree  Frequency/Duration: Patient will be followed by speech-language pathology 3 times a week to address goals. Discharge Recommendations: To Be Determined     SUBJECTIVE:   Patient is agreeable to beginning eval    OBJECTIVE:     Past Medical History:   Diagnosis Date    Anemia     BPH (benign prostatic hyperplasia)     Chronic kidney disease     Diabetes ([facility] [address]     Hypercholesteremia     Hyperlipidemia     Neurological disorder     Nonspeaking deaf    History reviewed. No pertinent surgical history.     Prior Level of Function/Home Situation:   Home Situation  Support Systems: Other Family Member(s)  Patient Expects to be Discharged to[de-identified] Long Term Care  Diet prior to admission: Unknown  Current Diet: NPO    Cognitive and Communication Status:  Neurologic State: Alert  Orientation Level: Unable to verbalize  Cognition: Follows commands    Swallowing Evaluation:   Oral Assessment:  Oral Assessment  Labial: No impairment  Dentition: Full;Natural  Oral Hygiene:  (Adequate)  Lingual: No impairment    P.O. Trials:  Patient Position:  (HOB raised)  Vocal quality prior to P.O.: Other (comment) (Unable to assess)  Consistency Presented: Thin liquid; Solid;Puree;Mechanical soft; Ice chips  How Presented: Self-fed/presented;SLP-fed/presented;Cup/sip;Spoon; Successive swallows  How Much:  (Multiple presentations)  Bolus Acceptance: No impairment  Bolus Formation/Control: No impairment  Propulsion: No impairment  Oral Residue: Less than 10% of bolus  Initiation of Swallow: Delayed (# of seconds)  Laryngeal Elevation: Functional  Aspiration Signs/Symptoms: Clear throat  Pharyngeal Phase Characteristics: Audible swallow  Oral Phase Severity: No impairment  Pharyngeal Phase Severity : Mild    Voice:  Vocal Quality: Other (comment) (Unable to assess)    Pain:  Patient did not exhibit s/s of pain/discomfort throughout eval     After treatment:   Patient left in no apparent distress in bed, Call bell within reach, and Nursing notified    COMMUNICATION/EDUCATION:   Patient was educated regarding purpose of SLP assessment, POC, diet recs and sw safety precautions. Patient demonstrated unknown understanding as evidenced by non-communicative. The patient's plan of care including recommendations, planned interventions, and recommended diet changes were discussed with: Registered nurse and Physician. Patient is unable to participate in goal setting and plan of care.     Problem: Dysphagia (Adult)  Goal: *Acute Goals and Plan of Care (Insert Text)  Description: Speech Therapy Goals  Initiated 6/11/2022  -Patient will tolerate EC diet with thin liquids without signs/symptoms of aspiration within 7 day(s). [ ] Not met  [ ]  MET   [ ] Progressing  [ ] Johana Loza  -Patient will tolerate PO trials with clinician without signs/symptoms of aspiration within 7 day(s). [ ] Not met  [ ]  MET   [ ] Progressing  [ ] Johana Loza  -Patient will demonstrate understanding of swallow safety precautions and aspiration precautions, diet recs with minimal-mod cues within 7 day(s).         [ ] Not met  [ ]  MET   [ ] Progressing  [ ] Discontinue   Outcome: Not Met    Thank you for this referral.  Radha Lozada M.S. CCC-SLP  Time Calculation: 18 mins

## 2022-06-11 NOTE — PROGRESS NOTES
Hospitalist Progress Note    Subjective:   Daily Progress Note: 6/11/2022 9:06 AM    Patient appears happier today remains non-communicative. Ostomy with soft liquid stool.     Current Facility-Administered Medications   Medication Dose Route Frequency    dextrose 5% - LR with KCl 20 mEq/L infusion   IntraVENous CONTINUOUS    potassium chloride 10 mEq in 100 ml IVPB  10 mEq IntraVENous Q1H    sodium chloride (NS) flush 5-40 mL  5-40 mL IntraVENous Q8H    sodium chloride (NS) flush 5-40 mL  5-40 mL IntraVENous PRN    ondansetron (ZOFRAN ODT) tablet 4 mg  4 mg Oral Q8H PRN    Or    ondansetron (ZOFRAN) injection 4 mg  4 mg IntraVENous Q6H PRN    enoxaparin (LOVENOX) injection 40 mg  40 mg SubCUTAneous DAILY    HYDROmorphone (DILAUDID) syringe 0.25 mg  0.25 mg IntraVENous Q3H PRN    Or    HYDROmorphone (DILAUDID) injection 1 mg  1 mg IntraVENous Q4H PRN    zinc oxide-white petrolatum 20-75 % topical paste   Topical BID    collagenase (SANTYL) 250 unit/gram ointment   Topical DAILY    piperacillin-tazobactam (ZOSYN) 3.375 g in 0.9% sodium chloride (MBP/ADV) 100 mL MBP  3.375 g IntraVENous Q8H    insulin lispro (HUMALOG) injection   SubCUTAneous Q6H    glucose chewable tablet 16 g  4 Tablet Oral PRN    glucagon (GLUCAGEN) injection 1 mg  1 mg IntraMUSCular PRN    dextrose 10% infusion 0-250 mL  0-250 mL IntraVENous PRN    labetaloL (NORMODYNE;TRANDATE) 20 mg/4 mL (5 mg/mL) injection 10 mg  10 mg IntraVENous Q4H PRN    sodium chloride (NS) flush 5-40 mL  5-40 mL IntraVENous Q8H    sodium chloride (NS) flush 5-40 mL  5-40 mL IntraVENous PRN    acetaminophen (TYLENOL) tablet 650 mg  650 mg Oral Q6H PRN    Or    acetaminophen (TYLENOL) suppository 650 mg  650 mg Rectal Q6H PRN    polyethylene glycol (MIRALAX) packet 17 g  17 g Oral DAILY PRN    ondansetron (ZOFRAN ODT) tablet 4 mg  4 mg Oral Q8H PRN    Or    ondansetron (ZOFRAN) injection 4 mg  4 mg IntraVENous Q6H PRN    haloperidol lactate (HALDOL) injection 2 mg  2 mg IntraMUSCular Q4H PRN    insulin glargine (LANTUS) injection 14 Units  14 Units SubCUTAneous DAILY        Review of Systems  Review of Systems   Unable to perform ROS: Acuity of condition            Objective:     Visit Vitals  BP (!) 145/77 (BP 1 Location: Left upper arm, BP Patient Position: At rest;Supine)   Pulse 92   Temp 98.6 °F (37 °C)   Resp 19   Ht 5' 10\" (1.778 m)   Wt 83.9 kg (185 lb)   SpO2 95%   BMI 26.54 kg/m²      O2 Device: None (Room air)    Temp (24hrs), Av.6 °F (37 °C), Min:98.4 °F (36.9 °C), Max:98.8 °F (37.1 °C)      701 - 1900  In: -   Out: 80   1901 -  07  In: 950 [I.V.:950]  Out: 470 [Urine:100; Drains:170]    Recent Results (from the past 24 hour(s))   GLUCOSE, POC    Collection Time: 06/10/22 12:37 PM   Result Value Ref Range    Glucose (POC) 168 (H) 65 - 117 mg/dL    Performed by Teo Core    GLUCOSE, POC    Collection Time: 06/10/22  6:03 PM   Result Value Ref Range    Glucose (POC) 156 (H) 65 - 117 mg/dL    Performed by Greer Core    GLUCOSE, POC    Collection Time: 06/10/22 11:51 PM   Result Value Ref Range    Glucose (POC) 170 (H) 65 - 117 mg/dL    Performed by Murtaza Lawrence    GLUCOSE, POC    Collection Time: 22  6:34 AM   Result Value Ref Range    Glucose (POC) 157 (H) 65 - 117 mg/dL    Performed by Murtaza Lawrence    METABOLIC PANEL, COMPREHENSIVE    Collection Time: 22  7:54 AM   Result Value Ref Range    Sodium 144 136 - 145 mmol/L    Potassium 2.8 (L) 3.5 - 5.1 mmol/L    Chloride 111 (H) 97 - 108 mmol/L    CO2 29 21 - 32 mmol/L    Anion gap 4 (L) 5 - 15 mmol/L    Glucose 182 (H) 65 - 100 mg/dL    BUN 5 (L) 6 - 20 mg/dL    Creatinine 0.91 0.70 - 1.30 mg/dL    BUN/Creatinine ratio 5 (L) 12 - 20      GFR est AA >60 >60 ml/min/1.73m2    GFR est non-AA >60 >60 ml/min/1.73m2    Calcium 8.2 (L) 8.5 - 10.1 mg/dL    Bilirubin, total 0.4 0.2 - 1.0 mg/dL    AST (SGOT) 7 (L) 15 - 37 U/L    ALT (SGPT) 8 (L) 12 - 78 U/L    Alk. phosphatase 85 45 - 117 U/L    Protein, total 6.4 6.4 - 8.2 g/dL    Albumin 2.2 (L) 3.5 - 5.0 g/dL    Globulin 4.2 (H) 2.0 - 4.0 g/dL    A-G Ratio 0.5 (L) 1.1 - 2.2     CBC WITH AUTOMATED DIFF    Collection Time: 06/11/22  7:54 AM   Result Value Ref Range    WBC 9.0 4.1 - 11.1 K/uL    RBC 3.36 (L) 4.10 - 5.70 M/uL    HGB 9.2 (L) 12.1 - 17.0 g/dL    HCT 29.1 (L) 36.6 - 50.3 %    MCV 86.6 80.0 - 99.0 FL    MCH 27.4 26.0 - 34.0 PG    MCHC 31.6 30.0 - 36.5 g/dL    RDW 15.6 (H) 11.5 - 14.5 %    PLATELET 460 561 - 177 K/uL    MPV 11.2 8.9 - 12.9 FL    NRBC 0.0 0.0  WBC    ABSOLUTE NRBC 0.00 0.00 - 0.01 K/uL    NEUTROPHILS 69 32 - 75 %    LYMPHOCYTES 17 12 - 49 %    MONOCYTES 10 5 - 13 %    EOSINOPHILS 3 0 - 7 %    BASOPHILS 0 0 - 1 %    IMMATURE GRANULOCYTES 1 (H) 0 - 0.5 %    ABS. NEUTROPHILS 6.1 1.8 - 8.0 K/UL    ABS. LYMPHOCYTES 1.5 0.8 - 3.5 K/UL    ABS. MONOCYTES 0.9 0.0 - 1.0 K/UL    ABS. EOSINOPHILS 0.3 0.0 - 0.4 K/UL    ABS. BASOPHILS 0.0 0.0 - 0.1 K/UL    ABS. IMM. GRANS. 0.1 (H) 0.00 - 0.04 K/UL    DF AUTOMATED     C REACTIVE PROTEIN, QT    Collection Time: 06/11/22  7:54 AM   Result Value Ref Range    C-Reactive protein 16.40 (H) 0.00 - 0.60 mg/dL        XR CHEST SNGL V   Final Result   1. The nasogastric tube is in satisfactory position. 2.  There is mild hypoventilation and minimal atelectasis within the lung bases. The lungs are otherwise clear   3. This examination is negative for new pulmonary pathology or additional   interval changes. XR CHEST PORT   Final Result   NG tube directed toward the distal stomach. No acute cardiopulmonary   finding. ANKLE BRACHIAL INDEX   Final Result      XR CHEST PORT   Final Result   Partially imaged NG tube coursing below the left hemidiaphragm,   terminating in the stomach. CT ABD PELV WO CONT   Final Result   Persistent sigmoid colon distention.    Tapered narrowing for the sigmoid colon through pelvis again noting mesenteric swirling. Findings again concerning for sigmoid volvulus. Pelvic small bowel loops are oral contrast filled and distended. (oral contrast   has not reached to the level of the colon at the time of imaging). No free intraperitoneal air. No ascites. Report called to 2E. Spoke with nurse Mela Morillo. XR CHEST PORT   Final Result   Negative exam.      XR ABD (KUB)   Final Result   Persistent sigmoid volvulus. XR CHEST PORT   Final Result   Diminished bibasilar atelectasis. XR ABD ACUTE W 1 V CHEST   Final Result   1. Enteric tube insertion. 2. Persistent distention of small and large bowel. 3. Mild bibasilar atelectasis. CT ABD PELV WO CONT   Final Result   Sigmoid volvulus. PHYSICAL EXAM:    Physical Exam  Vitals and nursing note reviewed. HENT:      Head: Normocephalic and atraumatic. Cardiovascular:      Rate and Rhythm: Normal rate and regular rhythm. Pulmonary:      Effort: No respiratory distress. Breath sounds: No wheezing. Abdominal:      General: Bowel sounds are normal. There is no distension. Palpations: Abdomen is soft. Tenderness: There is no abdominal tenderness. Comments: Vertical incision in epigastric area dressing clean, dry, and intact with CELESTINO drain in place in RLQ  Colostomy present in LLQ with light brown liquid stool within     Genitourinary:     Comments: Sosa not present  Musculoskeletal:      Right lower leg: No edema. Left lower leg: No edema. Skin:     Capillary Refill: Capillary refill takes less than 2 seconds. Comments: Left heel: slough with devitalized tissue in dressing clean, dry, and intact  Sacral wounds presnet   Neurological:      Mental Status: He is alert.       Comments: Patient appears to be interactive with yes/no questions but is difficult to ascertain cognition    Psychiatric:      Comments: Unable to assess          Data Review    Recent Results (from the past 24 hour(s))   GLUCOSE, POC Collection Time: 06/10/22 12:37 PM   Result Value Ref Range    Glucose (POC) 168 (H) 65 - 117 mg/dL    Performed by Bisi Hawkins    GLUCOSE, POC    Collection Time: 06/10/22  6:03 PM   Result Value Ref Range    Glucose (POC) 156 (H) 65 - 117 mg/dL    Performed by Bisi CA, POC    Collection Time: 06/10/22 11:51 PM   Result Value Ref Range    Glucose (POC) 170 (H) 65 - 117 mg/dL    Performed by Rakel, POC    Collection Time: 06/11/22  6:34 AM   Result Value Ref Range    Glucose (POC) 157 (H) 65 - 117 mg/dL    Performed by [facility]    METABOLIC PANEL, COMPREHENSIVE    Collection Time: 06/11/22  7:54 AM   Result Value Ref Range    Sodium 144 136 - 145 mmol/L    Potassium 2.8 (L) 3.5 - 5.1 mmol/L    Chloride 111 (H) 97 - 108 mmol/L    CO2 29 21 - 32 mmol/L    Anion gap 4 (L) 5 - 15 mmol/L    Glucose 182 (H) 65 - 100 mg/dL    BUN 5 (L) 6 - 20 mg/dL    Creatinine 0.91 0.70 - 1.30 mg/dL    BUN/Creatinine ratio 5 (L) 12 - 20      GFR est AA >60 >60 ml/min/1.73m2    GFR est non-AA >60 >60 ml/min/1.73m2    Calcium 8.2 (L) 8.5 - 10.1 mg/dL    Bilirubin, total 0.4 0.2 - 1.0 mg/dL    AST (SGOT) 7 (L) 15 - 37 U/L    ALT (SGPT) 8 (L) 12 - 78 U/L    Alk.  phosphatase 85 45 - 117 U/L    Protein, total 6.4 6.4 - 8.2 g/dL    Albumin 2.2 (L) 3.5 - 5.0 g/dL    Globulin 4.2 (H) 2.0 - 4.0 g/dL    A-G Ratio 0.5 (L) 1.1 - 2.2     CBC WITH AUTOMATED DIFF    Collection Time: 06/11/22  7:54 AM   Result Value Ref Range    WBC 9.0 4.1 - 11.1 K/uL    RBC 3.36 (L) 4.10 - 5.70 M/uL    HGB 9.2 (L) 12.1 - 17.0 g/dL    HCT 29.1 (L) 36.6 - 50.3 %    MCV 86.6 80.0 - 99.0 FL    MCH 27.4 26.0 - 34.0 PG    MCHC 31.6 30.0 - 36.5 g/dL    RDW 15.6 (H) 11.5 - 14.5 %    PLATELET 767 808 - 185 K/uL    MPV 11.2 8.9 - 12.9 FL    NRBC 0.0 0.0  WBC    ABSOLUTE NRBC 0.00 0.00 - 0.01 K/uL    NEUTROPHILS 69 32 - 75 %    LYMPHOCYTES 17 12 - 49 %    MONOCYTES 10 5 - 13 %    EOSINOPHILS 3 0 - 7 %    BASOPHILS 0 0 - 1 %    IMMATURE GRANULOCYTES 1 (H) 0 - 0.5 %    ABS. NEUTROPHILS 6.1 1.8 - 8.0 K/UL    ABS. LYMPHOCYTES 1.5 0.8 - 3.5 K/UL    ABS. MONOCYTES 0.9 0.0 - 1.0 K/UL    ABS. EOSINOPHILS 0.3 0.0 - 0.4 K/UL    ABS. BASOPHILS 0.0 0.0 - 0.1 K/UL    ABS. IMM. GRANS. 0.1 (H) 0.00 - 0.04 K/UL    DF AUTOMATED     C REACTIVE PROTEIN, QT    Collection Time: 06/11/22  7:54 AM   Result Value Ref Range    C-Reactive protein 16.40 (H) 0.00 - 0.60 mg/dL        Assessment/Plan:     Active Problems:    Sigmoid volvulus ([facility] [address] (6/4/2022)      Type 2 diabetes mellitus with stage 2 chronic kidney disease, with long-term current use of insulin ([facility] [address] (6/10/2022)      Hypokalemia (6/10/2022)      Foot ulcer due to secondary DM ([address] (6/10/2022)      Stage 2 chronic kidney disease (6/10/2022)      Hypertension (6/10/2022)        Hospital Course:    Tina Carnes is a 68-year-old male with a past medical history of schizophrenia, hypertension, DM, and hyperlipidemia who presented with abdominal pain. ED vital signs unremarkable. Initial labs significant for creatinine of 2.02 and lactic of 3. Blood culture pending. CT abdomen/pelvis showed sigmoid volvulus. Patient admitted for further work-up. Patient started on Rocephin. NG tube and rectal tube placed. General surgery and GI consulted. Underwent sigmoidoscopy with decompression on 6/4. KUB showed enteric tube insertion, mild bibasilar atelectasis, persistent distention of small and large bowel. KUB 6/5 showed persistent sigmoid volvulus. CXR negative for acute pulmonary abnormality. Repeat CT abd/pelvis showed persistent sigmoid colon distention tapered narrowing for the sigmoid colon through pelvis again noted mesenteric swirling pelvic small bowel loops filled and distended with oral contrast. Ceftriaxone discontinued and started on Zosyn for empiric coverage. ELVIRA showed mild PAD of bilateral LE. Exploratory laparotomy with a sigmoid colon resection and colostomy placement performed on 6/8. Urine culture negative. NG to remain in place until colostomy is functioning. SLP and nutrition consulted. Sigmoid volvulus  S/p sigmoidoscopy with decompression on 6/4  Exploratory laparotomy with a sigmoid colon resection and colostomy placement performed on 6/8  NG in place until colostomy functioning  Continue IVF infusion  GI/General surgery following    Diabetic ulcer of the left heel  Afebrile, no elevated WBC, inflammatory markers pending  Continue on Zosyn #3/5   6/4 blood: negative  ELVIRA showed mild PAD of bilateral LE  Wound culture pending  Wound care following    Hypokalemia  Continue on IV supplemenation    Deaf  Noncommunicative, currently calm  Haldol as needed    Diabetes mellitus, type II  A1c 7.8  Continue on Lantus 14 units and sliding scale insulin as needed    CKD II  Creatinine stable  Monitor    Urinary retention  UA + hematuria and proteinuria  6/9 urine: negative  Monitor urinary output and consider Sosa cath if needed    DVT Prophylaxis: lovenox  Discharge and disposition barriers: SLP eval, tolerating po diet, 24 - 48 hours    [facility] sister - [phone] - updated on patient condition    Care Plan discussed with: Patient/Family, Nurse and     Total time spent with patient: 35 minutes.

## 2022-06-11 NOTE — PROGRESS NOTES
Patient alert with confusion. Remains with bilateral soft wrists restraints. Attempted to educate patient to use call bell for assistance but unsuccessful. Call bell in reach No s/s of pain discomfort or distress noted.

## 2022-06-11 NOTE — PROGRESS NOTES
Progress Note    Patient: Halie Fischer MRN: [id number]  SSN: [id number]    YOB: 1950  Age: 66 y.o.   Sex: male      Admit Date: 6/4/2022    LOS: 7 days     Subjective:   Postoperative day 3 status post exploratory laparotomy, sigmoid colectomy and colostomy for sigmoid volvulus  Patient seen in bed  Noncommunicative does not appear to be any distress    Objective:     Vitals:    06/10/22 1532 06/10/22 2330 06/11/22 0443 06/11/22 0738   BP: (!) 192/96 (!) 129/59 135/68 (!) 145/77   Pulse: 89 (!) 102 97 92   Resp: 16 18 18 19   Temp: 98.7 °F (37.1 °C) 98.8 °F (37.1 °C) 98.4 °F (36.9 °C) 98.6 °F (37 °C)   SpO2: 96% 96% 93% 95%   Weight:       Height:            Intake and Output:  Current Shift: 06/11 0701 - 06/11 1900  In: -   Out: 80   Last three shifts: 06/09 1901 - 06/11 0700  In: 950 [I.V.:950]  Out: 470 [Urine:100; Drains:170]    Review of Systems:  ROS     Physical Exam:   Soft, nondistended, appropriately tender, colostomy productive of stool, CELESTINO drain serosanguineous, incision clean and intact    Lab/Data Review:  Recent Results (from the past 24 hour(s))   GLUCOSE, POC    Collection Time: 06/10/22 12:37 PM   Result Value Ref Range    Glucose (POC) 168 (H) 65 - 117 mg/dL    Performed by Monse Irving, POC    Collection Time: 06/10/22  6:03 PM   Result Value Ref Range    Glucose (POC) 156 (H) 65 - 117 mg/dL    Performed by Monse Irving, POC    Collection Time: 06/10/22 11:51 PM   Result Value Ref Range    Glucose (POC) 170 (H) 65 - 117 mg/dL    Performed by Godwin Bender    GLUCOSE, POC    Collection Time: 06/11/22  6:34 AM   Result Value Ref Range    Glucose (POC) 157 (H) 65 - 117 mg/dL    Performed by Godwin Bender    METABOLIC PANEL, COMPREHENSIVE    Collection Time: 06/11/22  7:54 AM   Result Value Ref Range    Sodium 144 136 - 145 mmol/L    Potassium 2.8 (L) 3.5 - 5.1 mmol/L    Chloride 111 (H) 97 - 108 mmol/L    CO2 29 21 - 32 mmol/L    Anion gap 4 (L) 5 - 15 mmol/L    Glucose 182 (H) 65 - 100 mg/dL    BUN 5 (L) 6 - 20 mg/dL    Creatinine 0.91 0.70 - 1.30 mg/dL    BUN/Creatinine ratio 5 (L) 12 - 20      GFR est AA >60 >60 ml/min/1.73m2    GFR est non-AA >60 >60 ml/min/1.73m2    Calcium 8.2 (L) 8.5 - 10.1 mg/dL    Bilirubin, total 0.4 0.2 - 1.0 mg/dL    AST (SGOT) 7 (L) 15 - 37 U/L    ALT (SGPT) 8 (L) 12 - 78 U/L    Alk. phosphatase 85 45 - 117 U/L    Protein, total 6.4 6.4 - 8.2 g/dL    Albumin 2.2 (L) 3.5 - 5.0 g/dL    Globulin 4.2 (H) 2.0 - 4.0 g/dL    A-G Ratio 0.5 (L) 1.1 - 2.2     CBC WITH AUTOMATED DIFF    Collection Time: 06/11/22  7:54 AM   Result Value Ref Range    WBC 9.0 4.1 - 11.1 K/uL    RBC 3.36 (L) 4.10 - 5.70 M/uL    HGB 9.2 (L) 12.1 - 17.0 g/dL    HCT 29.1 (L) 36.6 - 50.3 %    MCV 86.6 80.0 - 99.0 FL    MCH 27.4 26.0 - 34.0 PG    MCHC 31.6 30.0 - 36.5 g/dL    RDW 15.6 (H) 11.5 - 14.5 %    PLATELET 601 972 - 545 K/uL    MPV 11.2 8.9 - 12.9 FL    NRBC 0.0 0.0  WBC    ABSOLUTE NRBC 0.00 0.00 - 0.01 K/uL    NEUTROPHILS 69 32 - 75 %    LYMPHOCYTES 17 12 - 49 %    MONOCYTES 10 5 - 13 %    EOSINOPHILS 3 0 - 7 %    BASOPHILS 0 0 - 1 %    IMMATURE GRANULOCYTES 1 (H) 0 - 0.5 %    ABS. NEUTROPHILS 6.1 1.8 - 8.0 K/UL    ABS. LYMPHOCYTES 1.5 0.8 - 3.5 K/UL    ABS. MONOCYTES 0.9 0.0 - 1.0 K/UL    ABS. EOSINOPHILS 0.3 0.0 - 0.4 K/UL    ABS. BASOPHILS 0.0 0.0 - 0.1 K/UL    ABS. IMM.  GRANS. 0.1 (H) 0.00 - 0.04 K/UL    DF AUTOMATED     C REACTIVE PROTEIN, QT    Collection Time: 06/11/22  7:54 AM   Result Value Ref Range    C-Reactive protein 16.40 (H) 0.00 - 0.60 mg/dL          Assessment:     Active Problems:    Sigmoid volvulus ([address] (6/4/2022)      Type 2 diabetes mellitus with stage 2 chronic kidney disease, with long-term current use of insulin ([address] (6/10/2022)      Hypokalemia (6/10/2022)      Foot ulcer due to secondary DM ([address] (6/10/2022)      Stage 2 chronic kidney disease (6/10/2022)      Hypertension (6/10/2022)        Plan:   Doing well  DC nasogastric tube  Replete potassium  Continue n.p.o.   Up to chair    Signed By: Oz Yanez MD     June 11, 2022

## 2022-06-12 LAB
ALBUMIN SERPL-MCNC: 2.2 G/DL (ref 3.5–5)
ALBUMIN/GLOB SERPL: 0.6 {RATIO} (ref 1.1–2.2)
ALP SERPL-CCNC: 78 U/L (ref 45–117)
ALT SERPL-CCNC: 9 U/L (ref 12–78)
ANION GAP SERPL CALC-SCNC: 5 MMOL/L (ref 5–15)
AST SERPL W P-5'-P-CCNC: 10 U/L (ref 15–37)
BASOPHILS # BLD: 0 K/UL (ref 0–0.1)
BASOPHILS NFR BLD: 0 % (ref 0–1)
BILIRUB SERPL-MCNC: 0.3 MG/DL (ref 0.2–1)
BUN SERPL-MCNC: 5 MG/DL (ref 6–20)
BUN/CREAT SERPL: 5 (ref 12–20)
CA-I BLD-MCNC: 8 MG/DL (ref 8.5–10.1)
CHLORIDE SERPL-SCNC: 111 MMOL/L (ref 97–108)
CO2 SERPL-SCNC: 29 MMOL/L (ref 21–32)
CREAT SERPL-MCNC: 1.02 MG/DL (ref 0.7–1.3)
CRP SERPL-MCNC: 9.97 MG/DL (ref 0–0.6)
DIFFERENTIAL METHOD BLD: ABNORMAL
EOSINOPHIL # BLD: 0.6 K/UL (ref 0–0.4)
EOSINOPHIL NFR BLD: 8 % (ref 0–7)
ERYTHROCYTE [DISTWIDTH] IN BLOOD BY AUTOMATED COUNT: 15.7 % (ref 11.5–14.5)
GLOBULIN SER CALC-MCNC: 4 G/DL (ref 2–4)
GLUCOSE BLD STRIP.AUTO-MCNC: 153 MG/DL (ref 65–117)
GLUCOSE BLD STRIP.AUTO-MCNC: 154 MG/DL (ref 65–117)
GLUCOSE BLD STRIP.AUTO-MCNC: 166 MG/DL (ref 65–117)
GLUCOSE BLD STRIP.AUTO-MCNC: 304 MG/DL (ref 65–117)
GLUCOSE SERPL-MCNC: 145 MG/DL (ref 65–100)
HCT VFR BLD AUTO: 27.3 % (ref 36.6–50.3)
HGB BLD-MCNC: 8.4 G/DL (ref 12.1–17)
IMM GRANULOCYTES # BLD AUTO: 0.1 K/UL (ref 0–0.04)
IMM GRANULOCYTES NFR BLD AUTO: 1 % (ref 0–0.5)
LYMPHOCYTES # BLD: 1.9 K/UL (ref 0.8–3.5)
LYMPHOCYTES NFR BLD: 25 % (ref 12–49)
MCH RBC QN AUTO: 26.8 PG (ref 26–34)
MCHC RBC AUTO-ENTMCNC: 30.8 G/DL (ref 30–36.5)
MCV RBC AUTO: 86.9 FL (ref 80–99)
MONOCYTES # BLD: 0.7 K/UL (ref 0–1)
MONOCYTES NFR BLD: 10 % (ref 5–13)
NEUTS SEG # BLD: 4.1 K/UL (ref 1.8–8)
NEUTS SEG NFR BLD: 56 % (ref 32–75)
NRBC # BLD: 0 K/UL (ref 0–0.01)
NRBC BLD-RTO: 0 PER 100 WBC
PERFORMED BY, TECHID: ABNORMAL
PLATELET # BLD AUTO: 300 K/UL (ref 150–400)
PMV BLD AUTO: 11.2 FL (ref 8.9–12.9)
POTASSIUM SERPL-SCNC: 3.3 MMOL/L (ref 3.5–5.1)
PROT SERPL-MCNC: 6.2 G/DL (ref 6.4–8.2)
RBC # BLD AUTO: 3.14 M/UL (ref 4.1–5.7)
SODIUM SERPL-SCNC: 145 MMOL/L (ref 136–145)
WBC # BLD AUTO: 7.3 K/UL (ref 4.1–11.1)

## 2022-06-12 PROCEDURE — 36415 COLL VENOUS BLD VENIPUNCTURE: CPT

## 2022-06-12 PROCEDURE — 65270000029 HC RM PRIVATE

## 2022-06-12 PROCEDURE — 86140 C-REACTIVE PROTEIN: CPT

## 2022-06-12 PROCEDURE — 99024 POSTOP FOLLOW-UP VISIT: CPT | Performed by: COLON & RECTAL SURGERY

## 2022-06-12 PROCEDURE — 74011000258 HC RX REV CODE- 258: Performed by: STUDENT IN AN ORGANIZED HEALTH CARE EDUCATION/TRAINING PROGRAM

## 2022-06-12 PROCEDURE — 74011636637 HC RX REV CODE- 636/637: Performed by: INTERNAL MEDICINE

## 2022-06-12 PROCEDURE — 82962 GLUCOSE BLOOD TEST: CPT

## 2022-06-12 PROCEDURE — 74011250636 HC RX REV CODE- 250/636: Performed by: STUDENT IN AN ORGANIZED HEALTH CARE EDUCATION/TRAINING PROGRAM

## 2022-06-12 PROCEDURE — 74011250636 HC RX REV CODE- 250/636: Performed by: SURGERY

## 2022-06-12 PROCEDURE — 85025 COMPLETE CBC W/AUTO DIFF WBC: CPT

## 2022-06-12 PROCEDURE — 80053 COMPREHEN METABOLIC PANEL: CPT

## 2022-06-12 RX ORDER — POTASSIUM CHLORIDE 7.45 MG/ML
10 INJECTION INTRAVENOUS
Status: COMPLETED | OUTPATIENT
Start: 2022-06-12 | End: 2022-06-12

## 2022-06-12 RX ADMIN — INSULIN LISPRO 2 UNITS: 100 INJECTION, SOLUTION INTRAVENOUS; SUBCUTANEOUS at 06:00

## 2022-06-12 RX ADMIN — ENOXAPARIN SODIUM 40 MG: 100 INJECTION SUBCUTANEOUS at 10:55

## 2022-06-12 RX ADMIN — POTASSIUM CHLORIDE 10 MEQ: 7.46 INJECTION, SOLUTION INTRAVENOUS at 12:24

## 2022-06-12 RX ADMIN — DEXTROSE MONOHYDRATE, SODIUM CHLORIDE, SODIUM LACTATE, POTASSIUM CHLORIDE, CALCIUM CHLORIDE: 5; 600; 310; 179; 20 INJECTION, SOLUTION INTRAVENOUS at 15:16

## 2022-06-12 RX ADMIN — POTASSIUM CHLORIDE 10 MEQ: 7.46 INJECTION, SOLUTION INTRAVENOUS at 13:22

## 2022-06-12 RX ADMIN — WHITE PETROLATUM,ZINC OXIDE: 20; 75 PASTE TOPICAL at 09:00

## 2022-06-12 RX ADMIN — INSULIN LISPRO 2 UNITS: 100 INJECTION, SOLUTION INTRAVENOUS; SUBCUTANEOUS at 18:18

## 2022-06-12 RX ADMIN — COLLAGENASE SANTYL: 250 OINTMENT TOPICAL at 09:00

## 2022-06-12 RX ADMIN — WHITE PETROLATUM,ZINC OXIDE: 20; 75 PASTE TOPICAL at 21:00

## 2022-06-12 RX ADMIN — HYDROMORPHONE HYDROCHLORIDE 1 MG: 1 INJECTION, SOLUTION INTRAMUSCULAR; INTRAVENOUS; SUBCUTANEOUS at 05:44

## 2022-06-12 RX ADMIN — PIPERACILLIN AND TAZOBACTAM 3.38 G: 3; .375 INJECTION, POWDER, LYOPHILIZED, FOR SOLUTION INTRAVENOUS at 05:44

## 2022-06-12 RX ADMIN — INSULIN LISPRO 8 UNITS: 100 INJECTION, SOLUTION INTRAVENOUS; SUBCUTANEOUS at 13:22

## 2022-06-12 RX ADMIN — INSULIN GLARGINE 14 UNITS: 100 INJECTION, SOLUTION SUBCUTANEOUS at 13:22

## 2022-06-12 NOTE — PROGRESS NOTES
Hospitalist Progress Note    Subjective:   Daily Progress Note: 6/12/2022 9:06 AM    Patient resting comfortably, interactive with yes/no shake of head, unclear capacity of understanding    Current Facility-Administered Medications   Medication Dose Route Frequency    dextrose 5% - LR with KCl 20 mEq/L infusion   IntraVENous CONTINUOUS    piperacillin-tazobactam (ZOSYN) 3.375 g in 0.9% sodium chloride (MBP/ADV) 100 mL MBP  3.375 g IntraVENous Q8H    ondansetron (ZOFRAN ODT) tablet 4 mg  4 mg Oral Q8H PRN    Or    ondansetron (ZOFRAN) injection 4 mg  4 mg IntraVENous Q6H PRN    enoxaparin (LOVENOX) injection 40 mg  40 mg SubCUTAneous DAILY    HYDROmorphone (DILAUDID) syringe 0.25 mg  0.25 mg IntraVENous Q3H PRN    Or    HYDROmorphone (DILAUDID) injection 1 mg  1 mg IntraVENous Q4H PRN    zinc oxide-white petrolatum 20-75 % topical paste   Topical BID    collagenase (SANTYL) 250 unit/gram ointment   Topical DAILY    insulin lispro (HUMALOG) injection   SubCUTAneous Q6H    glucose chewable tablet 16 g  4 Tablet Oral PRN    glucagon (GLUCAGEN) injection 1 mg  1 mg IntraMUSCular PRN    dextrose 10% infusion 0-250 mL  0-250 mL IntraVENous PRN    labetaloL (NORMODYNE;TRANDATE) 20 mg/4 mL (5 mg/mL) injection 10 mg  10 mg IntraVENous Q4H PRN    acetaminophen (TYLENOL) tablet 650 mg  650 mg Oral Q6H PRN    Or    acetaminophen (TYLENOL) suppository 650 mg  650 mg Rectal Q6H PRN    polyethylene glycol (MIRALAX) packet 17 g  17 g Oral DAILY PRN    ondansetron (ZOFRAN ODT) tablet 4 mg  4 mg Oral Q8H PRN    Or    ondansetron (ZOFRAN) injection 4 mg  4 mg IntraVENous Q6H PRN    haloperidol lactate (HALDOL) injection 2 mg  2 mg IntraMUSCular Q4H PRN    insulin glargine (LANTUS) injection 14 Units  14 Units SubCUTAneous DAILY        Review of Systems  Review of Systems   Unable to perform ROS: Acuity of condition            Objective:     Visit Vitals  /89   Pulse 90   Temp 98.7 °F (37.1 °C)   Resp 18   Ht 5' 10\" (1.778 m)   Wt 83.9 kg (185 lb)   SpO2 98%   BMI 26.54 kg/m²      O2 Device: None (Room air)    Temp (24hrs), Av.9 °F (37.2 °C), Min:98.7 °F (37.1 °C), Max:99 °F (37.2 °C)      No intake/output data recorded. 06/10 1901 -  0700  In: 6345 [P.O.:740; I.V.:925]  Out: 1080 [Drains:350]    Recent Results (from the past 24 hour(s))   GLUCOSE, POC    Collection Time: 22 11:54 AM   Result Value Ref Range    Glucose (POC) 164 (H) 65 - 117 mg/dL    Performed by Maddie Andrade, POC    Collection Time: 22  5:56 PM   Result Value Ref Range    Glucose (POC) 116 65 - 117 mg/dL    Performed by Maddie Andrade, POC    Collection Time: 22 10:58 PM   Result Value Ref Range    Glucose (POC) 123 (H) 65 - 117 mg/dL    Performed by [id number] [employer] [facility], POC    Collection Time: 22  5:50 AM   Result Value Ref Range    Glucose (POC) 154 (H) 65 - 117 mg/dL    Performed by Fei Pelayo         XR CHEST SNGL V   Final Result   1. The nasogastric tube is in satisfactory position. 2.  There is mild hypoventilation and minimal atelectasis within the lung bases. The lungs are otherwise clear   3. This examination is negative for new pulmonary pathology or additional   interval changes. XR CHEST PORT   Final Result   NG tube directed toward the distal stomach. No acute cardiopulmonary   finding. ANKLE BRACHIAL INDEX   Final Result      XR CHEST PORT   Final Result   Partially imaged NG tube coursing below the left hemidiaphragm,   terminating in the stomach. CT ABD PELV WO CONT   Final Result   Persistent sigmoid colon distention. Tapered narrowing for the sigmoid colon through pelvis again noting mesenteric   swirling. Findings again concerning for sigmoid volvulus. Pelvic small bowel loops are oral contrast filled and distended. (oral contrast   has not reached to the level of the colon at the time of imaging). No free intraperitoneal air.  No ascites. Report called to 2E. Spoke with nurse Lovely Walton. XR CHEST PORT   Final Result   Negative exam.      XR ABD (KUB)   Final Result   Persistent sigmoid volvulus. XR CHEST PORT   Final Result   Diminished bibasilar atelectasis. XR ABD ACUTE W 1 V CHEST   Final Result   1. Enteric tube insertion. 2. Persistent distention of small and large bowel. 3. Mild bibasilar atelectasis. CT ABD PELV WO CONT   Final Result   Sigmoid volvulus. PHYSICAL EXAM:    Physical Exam  Vitals and nursing note reviewed. HENT:      Head: Normocephalic and atraumatic. Cardiovascular:      Rate and Rhythm: Normal rate and regular rhythm. Pulmonary:      Effort: No respiratory distress. Breath sounds: No wheezing. Abdominal:      General: Bowel sounds are normal. There is no distension. Palpations: Abdomen is soft. Tenderness: There is no abdominal tenderness. Comments: Vertical incision in epigastric area dressing clean, dry, and intact with CELESTINO drain in place in RLQ  Colostomy present in LLQ with light brown liquid stool within     Genitourinary:     Comments: Sosa not present  Musculoskeletal:      Right lower leg: No edema. Left lower leg: No edema. Skin:     Capillary Refill: Capillary refill takes less than 2 seconds. Comments: Left heel: slough with devitalized tissue in dressing clean, dry, and intact  Sacral wounds presnet   Neurological:      Mental Status: He is alert.       Comments: Patient appears to be interactive with yes/no questions but is difficult to ascertain cognition    Psychiatric:      Comments: Unable to assess          Data Review    Recent Results (from the past 24 hour(s))   GLUCOSE, POC    Collection Time: 06/11/22 11:54 AM   Result Value Ref Range    Glucose (POC) 164 (H) 65 - 117 mg/dL    Performed by Franny Desir    GLUCOSE, POC    Collection Time: 06/11/22  5:56 PM   Result Value Ref Range    Glucose (POC) 116 65 - 117 mg/dL Performed by Brendan Sam, POC    Collection Time: 06/11/22 10:58 PM   Result Value Ref Range    Glucose (POC) 123 (H) 65 - 117 mg/dL    Performed by Christina Fuentes, POC    Collection Time: 06/12/22  5:50 AM   Result Value Ref Range    Glucose (POC) 154 (H) 65 - 117 mg/dL    Performed by Uvaldo Mancuso         Assessment/Plan:     Active Problems:    Sigmoid volvulus ([address] (6/4/2022)      Type 2 diabetes mellitus with stage 2 chronic kidney disease, with long-term current use of insulin ([address] (6/10/2022)      Hypokalemia (6/10/2022)      Foot ulcer due to secondary DM ([address] (6/10/2022)      Stage 2 chronic kidney disease (6/10/2022)      Hypertension (6/10/2022)        Hospital Course:    Hayde Shah is a 70-year-old male with a past medical history of schizophrenia, hypertension, DM, and hyperlipidemia who presented with abdominal pain. ED vital signs unremarkable. Initial labs significant for creatinine of 2.02 and lactic of 3. Blood culture pending. CT abdomen/pelvis showed sigmoid volvulus. Patient admitted for further work-up. Patient started on Rocephin. NG tube and rectal tube placed. General surgery and GI consulted. Underwent sigmoidoscopy with decompression on 6/4. KUB showed enteric tube insertion, mild bibasilar atelectasis, persistent distention of small and large bowel. KUB 6/5 showed persistent sigmoid volvulus. CXR negative for acute pulmonary abnormality. Repeat CT abd/pelvis showed persistent sigmoid colon distention tapered narrowing for the sigmoid colon through pelvis again noted mesenteric swirling pelvic small bowel loops filled and distended with oral contrast. Ceftriaxone discontinued and started on Zosyn for empiric coverage. ELVIRA showed mild PAD of bilateral LE. Exploratory laparotomy with a sigmoid colon resection and colostomy placement performed on 6/8. Urine culture negative. NG to remain in place until colostomy is functioning. SLP and nutrition consulted.  SLP recommending easy to chew. PT/OT consulted. Sigmoid volvulus  S/p sigmoidoscopy with decompression on 6/4  Exploratory laparotomy with a sigmoid colon resection and colostomy placement performed on 6/8  Continue IVF infusion  GI/General surgery following    Diabetic ulcer of the left heel  Afebrile, no elevated WBC, inflammatory markers pending  Continue on Zosyn #5/5   6/4 blood: negative  ELVIRA showed mild PAD of bilateral LE  Wound culture pending  Wound care following    Hypokalemia  Continue on IV supplemenation    Deaf  Noncommunicative, currently calm  Haldol as needed    Diabetes mellitus, type II  A1c 7.8  Continue on Lantus 14 units and sliding scale insulin as needed    CKD II  Creatinine stable  Monitor    Urinary retention  UA + hematuria and proteinuria  6/9 urine: negative  Monitor urinary output and consider Sosa cath if needed    DVT Prophylaxis: lovenox  Discharge and disposition barriers: PT/OT eval, K stabilization, IV abx, general surgery clearance, 24 hours    [facility] sister - [phone] - updated on patient condition    Care Plan discussed with: Patient/Family, Nurse and     Total time spent with patient: 35 minutes.

## 2022-06-12 NOTE — PROGRESS NOTES
Problem: Pressure Injury - Risk of  Goal: *Prevention of pressure injury  Description: Document Jet Scale and appropriate interventions in the flowsheet. Outcome: Progressing Towards Goal  Note: Pressure Injury Interventions:  Sensory Interventions: Assess changes in LOC,Keep linens dry and wrinkle-free,Minimize linen layers,Turn and reposition approx. every two hours (pillows and wedges if needed)    Moisture Interventions: Absorbent underpads,Minimize layers,Internal/External urinary devices    Activity Interventions: Increase time out of bed,PT/OT evaluation    Mobility Interventions: HOB 30 degrees or less    Nutrition Interventions: Document food/fluid/supplement intake    Friction and Shear Interventions: HOB 30 degrees or less,Minimize layers                Problem: Falls - Risk of  Goal: *Absence of Falls  Description: Document Fabienne Fall Risk and appropriate interventions in the flowsheet.   Outcome: Progressing Towards Goal  Note: Fall Risk Interventions:       Mentation Interventions: Bed/chair exit alarm    Medication Interventions: Bed/chair exit alarm    Elimination Interventions: Bed/chair exit alarm

## 2022-06-12 NOTE — PROGRESS NOTES
Progress Note    Patient: Lucina Huggins MRN: [id number]  SSN: [id number]    YOB: 1950  Age: 66 y.o. Sex: male      Admit Date: 6/4/2022    LOS: 8 days     Subjective:   Postoperative day 4 status post total laparotomy sigmoid colectomy and end colostomy for sigmoid volvulus  Patient seen in bed  Shakes head yes and no to questions, reports he is hungry, denies pain    Objective:     Vitals:    06/11/22 0738 06/11/22 1958 06/11/22 2044 06/12/22 0300   BP: (!) 145/77 (!) 144/90  138/89   Pulse: 92 (!) 58 97 90   Resp: 19 20  18   Temp: 98.6 °F (37 °C) 99 °F (37.2 °C)  98.7 °F (37.1 °C)   SpO2: 95% 100%  98%   Weight:       Height:            Intake and Output:  Current Shift: 06/12 0701 - 06/12 1900  In: 480 [P.O.:480]  Out: -   Last three shifts: 06/10 1901 - 06/12 0700  In: 1665 [P.O.:740;  I.V.:925]  Out: 1080 [Drains:350]    Review of Systems:  ROS     Physical Exam:   Is soft, nondistended, appropriately tender, midline incision clean, colostomy pink productive of stool, CELESTINO drain serosanguineous drainage    Lab/Data Review:  Recent Results (from the past 24 hour(s))   GLUCOSE, POC    Collection Time: 06/11/22 11:54 AM   Result Value Ref Range    Glucose (POC) 164 (H) 65 - 117 mg/dL    Performed by [employer], POC    Collection Time: 06/11/22  5:56 PM   Result Value Ref Range    Glucose (POC) 116 65 - 117 mg/dL    Performed by [employer], POC    Collection Time: 06/11/22 10:58 PM   Result Value Ref Range    Glucose (POC) 123 (H) 65 - 117 mg/dL    Performed by [facility], POC    Collection Time: 06/12/22  5:50 AM   Result Value Ref Range    Glucose (POC) 154 (H) 65 - 117 mg/dL    Performed by [facility], COMPRE[facility]ENSIVE    Collection Time: 06/12/22  7:55 AM   Result Value Ref Range    Sodium 145 136 - 145 mmol/L    Potassium 3.3 (L) 3.5 - 5.1 mmol/L    Chloride 111 (H) 97 - 108 mmol/L    CO2 29 21 - 32 mmol/L    Anion gap 5 5 - 15 mmol/L    Glucose 145 (H) 65 - 100 mg/dL    BUN 5 (L) 6 - 20 mg/dL    Creatinine 1.02 0.70 - 1.30 mg/dL    BUN/Creatinine ratio 5 (L) 12 - 20      GFR est AA >60 >60 ml/min/1.73m2    GFR est non-AA >60 >60 ml/min/1.73m2    Calcium 8.0 (L) 8.5 - 10.1 mg/dL    Bilirubin, total 0.3 0.2 - 1.0 mg/dL    AST (SGOT) 10 (L) 15 - 37 U/L    ALT (SGPT) 9 (L) 12 - 78 U/L    Alk. phosphatase 78 45 - 117 U/L    Protein, total 6.2 (L) 6.4 - 8.2 g/dL    Albumin 2.2 (L) 3.5 - 5.0 g/dL    Globulin 4.0 2.0 - 4.0 g/dL    A-G Ratio 0.6 (L) 1.1 - 2.2     CBC WITH AUTOMATED DIFF    Collection Time: 06/12/22  7:55 AM   Result Value Ref Range    WBC 7.3 4.1 - 11.1 K/uL    RBC 3.14 (L) 4.10 - 5.70 M/uL    HGB 8.4 (L) 12.1 - 17.0 g/dL    HCT 27.3 (L) 36.6 - 50.3 %    MCV 86.9 80.0 - 99.0 FL    MCH 26.8 26.0 - 34.0 PG    MCHC 30.8 30.0 - 36.5 g/dL    RDW 15.7 (H) 11.5 - 14.5 %    PLATELET 643 584 - 342 K/uL    MPV 11.2 8.9 - 12.9 FL    NRBC 0.0 0.0  WBC    ABSOLUTE NRBC 0.00 0.00 - 0.01 K/uL    NEUTROPHILS 56 32 - 75 %    LYMPHOCYTES 25 12 - 49 %    MONOCYTES 10 5 - 13 %    EOSINOPHILS 8 (H) 0 - 7 %    BASOPHILS 0 0 - 1 %    IMMATURE GRANULOCYTES 1 (H) 0 - 0.5 %    ABS. NEUTROPHILS 4.1 1.8 - 8.0 K/UL    ABS. LYMPHOCYTES 1.9 0.8 - 3.5 K/UL    ABS. MONOCYTES 0.7 0.0 - 1.0 K/UL    ABS. EOSINOPHILS 0.6 (H) 0.0 - 0.4 K/UL    ABS. BASOPHILS 0.0 0.0 - 0.1 K/UL    ABS. IMM.  GRANS. 0.1 (H) 0.00 - 0.04 K/UL    DF AUTOMATED     C REACTIVE PROTEIN, QT    Collection Time: 06/12/22  7:55 AM   Result Value Ref Range    C-Reactive protein 9.97 (H) 0.00 - 0.60 mg/dL   GLUCOSE, POC    Collection Time: 06/12/22  9:03 AM   Result Value Ref Range    Glucose (POC) 153 (H) 65 - 117 mg/dL    Performed by Jag Thomas       Assessment:     Active Problems:    Sigmoid volvulus ([address] (6/4/2022)      Type 2 diabetes mellitus with stage 2 chronic kidney disease, with long-term current use of insulin ([address] (6/10/2022)      Hypokalemia (6/10/2022)      Foot ulcer due to secondary DM ([facility] [address] (6/10/2022)      Stage 2 chronic kidney disease (6/10/2022)      Hypertension (6/10/2022)        Plan:   Doing well  Advance to clear liquid diet  Continue local wound care to left foot ulcer  Lovenox for DVT prophylaxis    Addendum:  Started on soft easy chew diet by medical service yesterday    Signed By: Star Frank MD     June 12, 2022

## 2022-06-12 NOTE — PROGRESS NOTES
Nursing Dysphagia Assessment    Pt tolerated thin liquids without difficulty, no coughing/choking noted. Pt is able to chew and swallow easy to chew diet without difficulty. Pt consumes 100% of meals with feeding assistance.

## 2022-06-13 LAB
ALBUMIN SERPL-MCNC: 2.1 G/DL (ref 3.5–5)
ALBUMIN/GLOB SERPL: 0.6 {RATIO} (ref 1.1–2.2)
ALP SERPL-CCNC: 73 U/L (ref 45–117)
ALT SERPL-CCNC: 11 U/L (ref 12–78)
ANION GAP SERPL CALC-SCNC: 4 MMOL/L (ref 5–15)
AST SERPL W P-5'-P-CCNC: 13 U/L (ref 15–37)
BASOPHILS # BLD: 0 K/UL (ref 0–0.1)
BASOPHILS NFR BLD: 0 % (ref 0–1)
BILIRUB SERPL-MCNC: 0.2 MG/DL (ref 0.2–1)
BUN SERPL-MCNC: 7 MG/DL (ref 6–20)
BUN/CREAT SERPL: 8 (ref 12–20)
CA-I BLD-MCNC: 8.3 MG/DL (ref 8.5–10.1)
CHLORIDE SERPL-SCNC: 108 MMOL/L (ref 97–108)
CO2 SERPL-SCNC: 29 MMOL/L (ref 21–32)
CREAT SERPL-MCNC: 0.91 MG/DL (ref 0.7–1.3)
CRP SERPL-MCNC: 5.77 MG/DL (ref 0–0.6)
DIFFERENTIAL METHOD BLD: ABNORMAL
EOSINOPHIL # BLD: 0.6 K/UL (ref 0–0.4)
EOSINOPHIL NFR BLD: 7 % (ref 0–7)
ERYTHROCYTE [DISTWIDTH] IN BLOOD BY AUTOMATED COUNT: 15.6 % (ref 11.5–14.5)
GLOBULIN SER CALC-MCNC: 3.8 G/DL (ref 2–4)
GLUCOSE BLD STRIP.AUTO-MCNC: 135 MG/DL (ref 65–117)
GLUCOSE BLD STRIP.AUTO-MCNC: 157 MG/DL (ref 65–117)
GLUCOSE BLD STRIP.AUTO-MCNC: 166 MG/DL (ref 65–117)
GLUCOSE BLD STRIP.AUTO-MCNC: 269 MG/DL (ref 65–117)
GLUCOSE SERPL-MCNC: 136 MG/DL (ref 65–100)
HCT VFR BLD AUTO: 26 % (ref 36.6–50.3)
HGB BLD-MCNC: 8.2 G/DL (ref 12.1–17)
IMM GRANULOCYTES # BLD AUTO: 0.1 K/UL (ref 0–0.04)
IMM GRANULOCYTES NFR BLD AUTO: 1 % (ref 0–0.5)
LYMPHOCYTES # BLD: 1.8 K/UL (ref 0.8–3.5)
LYMPHOCYTES NFR BLD: 22 % (ref 12–49)
MCH RBC QN AUTO: 27.2 PG (ref 26–34)
MCHC RBC AUTO-ENTMCNC: 31.5 G/DL (ref 30–36.5)
MCV RBC AUTO: 86.1 FL (ref 80–99)
MONOCYTES # BLD: 0.9 K/UL (ref 0–1)
MONOCYTES NFR BLD: 11 % (ref 5–13)
NEUTS SEG # BLD: 5 K/UL (ref 1.8–8)
NEUTS SEG NFR BLD: 59 % (ref 32–75)
NRBC # BLD: 0.02 K/UL (ref 0–0.01)
NRBC BLD-RTO: 0.2 PER 100 WBC
PERFORMED BY, TECHID: ABNORMAL
PLATELET # BLD AUTO: 291 K/UL (ref 150–400)
PMV BLD AUTO: 11.4 FL (ref 8.9–12.9)
POTASSIUM SERPL-SCNC: 3.8 MMOL/L (ref 3.5–5.1)
PROT SERPL-MCNC: 5.9 G/DL (ref 6.4–8.2)
RBC # BLD AUTO: 3.02 M/UL (ref 4.1–5.7)
SODIUM SERPL-SCNC: 141 MMOL/L (ref 136–145)
WBC # BLD AUTO: 8.4 K/UL (ref 4.1–11.1)

## 2022-06-13 PROCEDURE — 65270000029 HC RM PRIVATE

## 2022-06-13 PROCEDURE — 74011636637 HC RX REV CODE- 636/637: Performed by: SURGERY

## 2022-06-13 PROCEDURE — 99024 POSTOP FOLLOW-UP VISIT: CPT | Performed by: SURGERY

## 2022-06-13 PROCEDURE — 74011636637 HC RX REV CODE- 636/637: Performed by: INTERNAL MEDICINE

## 2022-06-13 PROCEDURE — 80053 COMPREHEN METABOLIC PANEL: CPT

## 2022-06-13 PROCEDURE — 74011250636 HC RX REV CODE- 250/636: Performed by: STUDENT IN AN ORGANIZED HEALTH CARE EDUCATION/TRAINING PROGRAM

## 2022-06-13 PROCEDURE — 36415 COLL VENOUS BLD VENIPUNCTURE: CPT

## 2022-06-13 PROCEDURE — 82962 GLUCOSE BLOOD TEST: CPT

## 2022-06-13 PROCEDURE — 92526 ORAL FUNCTION THERAPY: CPT

## 2022-06-13 PROCEDURE — 74011250636 HC RX REV CODE- 250/636: Performed by: SURGERY

## 2022-06-13 PROCEDURE — 86140 C-REACTIVE PROTEIN: CPT

## 2022-06-13 PROCEDURE — 85025 COMPLETE CBC W/AUTO DIFF WBC: CPT

## 2022-06-13 RX ADMIN — DEXTROSE MONOHYDRATE, SODIUM CHLORIDE, SODIUM LACTATE, POTASSIUM CHLORIDE, CALCIUM CHLORIDE: 5; 600; 310; 179; 20 INJECTION, SOLUTION INTRAVENOUS at 06:00

## 2022-06-13 RX ADMIN — COLLAGENASE SANTYL: 250 OINTMENT TOPICAL at 09:06

## 2022-06-13 RX ADMIN — WHITE PETROLATUM,ZINC OXIDE: 20; 75 PASTE TOPICAL at 09:08

## 2022-06-13 RX ADMIN — ENOXAPARIN SODIUM 40 MG: 100 INJECTION SUBCUTANEOUS at 09:07

## 2022-06-13 RX ADMIN — WHITE PETROLATUM,ZINC OXIDE: 20; 75 PASTE TOPICAL at 21:00

## 2022-06-13 RX ADMIN — INSULIN LISPRO 2 UNITS: 100 INJECTION, SOLUTION INTRAVENOUS; SUBCUTANEOUS at 17:35

## 2022-06-13 RX ADMIN — INSULIN LISPRO 6 UNITS: 100 INJECTION, SOLUTION INTRAVENOUS; SUBCUTANEOUS at 12:19

## 2022-06-13 RX ADMIN — INSULIN LISPRO 2 UNITS: 100 INJECTION, SOLUTION INTRAVENOUS; SUBCUTANEOUS at 00:00

## 2022-06-13 RX ADMIN — INSULIN GLARGINE 14 UNITS: 100 INJECTION, SOLUTION SUBCUTANEOUS at 09:08

## 2022-06-13 NOTE — PROGRESS NOTES
Patient examined this morning. Patient looks comfortable. Vital signs stable. Abdomen soft. Colostomy has minimal stool output. We will advance regular diet. Continue local wound care on the left foot with iodine soaks. Patient has nonpalpable pedal pulse but ABIs normal most likely abnormally elevated. Patient will need angiographic examination left leg at some point. Continue monitor progress.

## 2022-06-13 NOTE — WOUND CARE
WCN in for ostomy assessment, area is clean, dry and intact. Stoma is red and moist. Stoma is producing soft brown stool. Patient is deaf and non-verbal, no appropriate for education. If family wound like ostomy education while patient is admitted can call for ostomy nurse to come speak with them,ext [phone].

## 2022-06-13 NOTE — PROGRESS NOTES
OT eval order received and acknowledged. OT eval attempted at 8:30 AM, however, nursing requesting therapy return later as pt did not sleep well last night. OT will continue to follow and eval at a later time. Thank you.

## 2022-06-13 NOTE — PROGRESS NOTES
Hospitalist Progress Note    Subjective:   Daily Progress Note: 2022 9:06 AM    Patient examined alert and nonverbal lying in bed. No acute distress noted on examination. No overnight events reported. Diet advanced, tolerating well. Mittens intact.     Current Facility-Administered Medications   Medication Dose Route Frequency    dextrose 5% - LR with KCl 20 mEq/L infusion   IntraVENous CONTINUOUS    ondansetron (ZOFRAN ODT) tablet 4 mg  4 mg Oral Q8H PRN    Or    ondansetron (ZOFRAN) injection 4 mg  4 mg IntraVENous Q6H PRN    enoxaparin (LOVENOX) injection 40 mg  40 mg SubCUTAneous DAILY    HYDROmorphone (DILAUDID) syringe 0.25 mg  0.25 mg IntraVENous Q3H PRN    Or    HYDROmorphone (DILAUDID) injection 1 mg  1 mg IntraVENous Q4H PRN    zinc oxide-white petrolatum 20-75 % topical paste   Topical BID    collagenase (SANTYL) 250 unit/gram ointment   Topical DAILY    insulin lispro (HUMALOG) injection   SubCUTAneous Q6H    glucose chewable tablet 16 g  4 Tablet Oral PRN    glucagon (GLUCAGEN) injection 1 mg  1 mg IntraMUSCular PRN    dextrose 10% infusion 0-250 mL  0-250 mL IntraVENous PRN    labetaloL (NORMODYNE;TRANDATE) 20 mg/4 mL (5 mg/mL) injection 10 mg  10 mg IntraVENous Q4H PRN    acetaminophen (TYLENOL) tablet 650 mg  650 mg Oral Q6H PRN    Or    acetaminophen (TYLENOL) suppository 650 mg  650 mg Rectal Q6H PRN    polyethylene glycol (MIRALAX) packet 17 g  17 g Oral DAILY PRN    haloperidol lactate (HALDOL) injection 2 mg  2 mg IntraMUSCular Q4H PRN    insulin glargine (LANTUS) injection 14 Units  14 Units SubCUTAneous DAILY        Review of Systems  Review of Systems   Unable to perform ROS: Acuity of condition            Objective:     Visit Vitals  BP (!) 142/73   Pulse 82   Temp 98.7 °F (37.1 °C)   Resp 18   Ht 5' 10\" (1.778 m)   Wt 83.9 kg (185 lb)   SpO2 100%   BMI 26.54 kg/m²      O2 Device: None (Room air)    Temp (24hrs), Av °F (37.2 °C), Min:98.7 °F (37.1 °C), Max:99.7 °F (37.6 °C)      06/13 0701 - 06/13 1900  In: -   Out: 800 [Urine:800]  06/11 1901 - 06/13 0700  In: 5908 [P.O.:2130; I.V.:1525]  Out: 2830 [Urine:1600; Drains:530]    Recent Results (from the past 24 hour(s))   GLUCOSE, POC    Collection Time: 06/12/22  4:51 PM   Result Value Ref Range    Glucose (POC) 166 (H) 65 - 117 mg/dL    Performed by Bridgette Brenner    GLUCOSE, POC    Collection Time: 06/13/22 12:10 AM   Result Value Ref Range    Glucose (POC) 157 (H) 65 - 117 mg/dL    Performed by Cyndi Keys    GLUCOSE, POC    Collection Time: 06/13/22  6:03 AM   Result Value Ref Range    Glucose (POC) 135 (H) 65 - 117 mg/dL    Performed by Cyndi Keys    METABOLIC PANEL, COMPREHENSIVE    Collection Time: 06/13/22  6:04 AM   Result Value Ref Range    Sodium 141 136 - 145 mmol/L    Potassium 3.8 3.5 - 5.1 mmol/L    Chloride 108 97 - 108 mmol/L    CO2 29 21 - 32 mmol/L    Anion gap 4 (L) 5 - 15 mmol/L    Glucose 136 (H) 65 - 100 mg/dL    BUN 7 6 - 20 mg/dL    Creatinine 0.91 0.70 - 1.30 mg/dL    BUN/Creatinine ratio 8 (L) 12 - 20      GFR est AA >60 >60 ml/min/1.73m2    GFR est non-AA >60 >60 ml/min/1.73m2    Calcium 8.3 (L) 8.5 - 10.1 mg/dL    Bilirubin, total 0.2 0.2 - 1.0 mg/dL    AST (SGOT) 13 (L) 15 - 37 U/L    ALT (SGPT) 11 (L) 12 - 78 U/L    Alk.  phosphatase 73 45 - 117 U/L    Protein, total 5.9 (L) 6.4 - 8.2 g/dL    Albumin 2.1 (L) 3.5 - 5.0 g/dL    Globulin 3.8 2.0 - 4.0 g/dL    A-G Ratio 0.6 (L) 1.1 - 2.2     CBC WITH AUTOMATED DIFF    Collection Time: 06/13/22  6:04 AM   Result Value Ref Range    WBC 8.4 4.1 - 11.1 K/uL    RBC 3.02 (L) 4.10 - 5.70 M/uL    HGB 8.2 (L) 12.1 - 17.0 g/dL    HCT 26.0 (L) 36.6 - 50.3 %    MCV 86.1 80.0 - 99.0 FL    MCH 27.2 26.0 - 34.0 PG    MCHC 31.5 30.0 - 36.5 g/dL    RDW 15.6 (H) 11.5 - 14.5 %    PLATELET 311 921 - 784 K/uL    MPV 11.4 8.9 - 12.9 FL    NRBC 0.2 (H) 0.0  WBC    ABSOLUTE NRBC 0.02 (H) 0.00 - 0.01 K/uL    NEUTROPHILS 59 32 - 75 %    LYMPHOCYTES 22 12 - 49 % MONOCYTES 11 5 - 13 %    EOSINOPHILS 7 0 - 7 %    BASOPHILS 0 0 - 1 %    IMMATURE GRANULOCYTES 1 (H) 0 - 0.5 %    ABS. NEUTROPHILS 5.0 1.8 - 8.0 K/UL    ABS. LYMPHOCYTES 1.8 0.8 - 3.5 K/UL    ABS. MONOCYTES 0.9 0.0 - 1.0 K/UL    ABS. EOSINOPHILS 0.6 (H) 0.0 - 0.4 K/UL    ABS. BASOPHILS 0.0 0.0 - 0.1 K/UL    ABS. IMM. GRANS. 0.1 (H) 0.00 - 0.04 K/UL    DF AUTOMATED     C REACTIVE PROTEIN, QT    Collection Time: 06/13/22  6:04 AM   Result Value Ref Range    C-Reactive protein 5.77 (H) 0.00 - 0.60 mg/dL   GLUCOSE, POC    Collection Time: 06/13/22 11:27 AM   Result Value Ref Range    Glucose (POC) 269 (H) 65 - 117 mg/dL    Performed by Mauro Monreal         XR CHEST SNGL V   Final Result   1. The nasogastric tube is in satisfactory position. 2.  There is mild hypoventilation and minimal atelectasis within the lung bases. The lungs are otherwise clear   3. This examination is negative for new pulmonary pathology or additional   interval changes. XR CHEST PORT   Final Result   NG tube directed toward the distal stomach. No acute cardiopulmonary   finding. ANKLE BRACHIAL INDEX   Final Result      XR CHEST PORT   Final Result   Partially imaged NG tube coursing below the left hemidiaphragm,   terminating in the stomach. CT ABD PELV WO CONT   Final Result   Persistent sigmoid colon distention. Tapered narrowing for the sigmoid colon through pelvis again noting mesenteric   swirling. Findings again concerning for sigmoid volvulus. Pelvic small bowel loops are oral contrast filled and distended. (oral contrast   has not reached to the level of the colon at the time of imaging). No free intraperitoneal air. No ascites. Report called to 2E. Spoke with nurse Rashad Garcia. XR CHEST PORT   Final Result   Negative exam.      XR ABD (KUB)   Final Result   Persistent sigmoid volvulus. XR CHEST PORT   Final Result   Diminished bibasilar atelectasis.       XR ABD ACUTE W 1 V CHEST   Final Result   1. Enteric tube insertion. 2. Persistent distention of small and large bowel. 3. Mild bibasilar atelectasis. CT ABD PELV WO CONT   Final Result   Sigmoid volvulus. PHYSICAL EXAM:    Physical Exam  Vitals and nursing note reviewed. HENT:      Head: Normocephalic and atraumatic. Ears:      Comments: deaf  Cardiovascular:      Rate and Rhythm: Normal rate and regular rhythm. Pulmonary:      Effort: No respiratory distress. Breath sounds: No wheezing. Abdominal:      General: Bowel sounds are normal. There is no distension. Palpations: Abdomen is soft. Tenderness: There is no abdominal tenderness. Comments: Vertical incision in epigastric area dressing clean, dry, and intact with CELESTINO drain in place in RLQ  Colostomy present in LLQ with light brown liquid stool within     Genitourinary:     Comments: Sosa not present  Musculoskeletal:      Right lower leg: No edema. Left lower leg: No edema. Skin:     Capillary Refill: Capillary refill takes less than 2 seconds. Comments: Left heel: dressing clean, dry, and intact  Sacral wounds present   Neurological:      Mental Status: He is alert.       Comments: Patient appears to be interactive with yes/no questions but is difficult to ascertain cognition    Psychiatric:      Comments: Unable to assess          Data Review    Recent Results (from the past 24 hour(s))   GLUCOSE, POC    Collection Time: 06/12/22  4:51 PM   Result Value Ref Range    Glucose (POC) 166 (H) 65 - 117 mg/dL    Performed by Marcela CA, POC    Collection Time: 06/13/22 12:10 AM   Result Value Ref Range    Glucose (POC) 157 (H) 65 - 117 mg/dL    Performed by Charlene Gomez, POC    Collection Time: 06/13/22  6:03 AM   Result Value Ref Range    Glucose (POC) 135 (H) 65 - 117 mg/dL    Performed by Juan Luis Bryant COMPREHENSIVE    Collection Time: 06/13/22  6:04 AM   Result Value Ref Range Sodium 141 136 - 145 mmol/L    Potassium 3.8 3.5 - 5.1 mmol/L    Chloride 108 97 - 108 mmol/L    CO2 29 21 - 32 mmol/L    Anion gap 4 (L) 5 - 15 mmol/L    Glucose 136 (H) 65 - 100 mg/dL    BUN 7 6 - 20 mg/dL    Creatinine 0.91 0.70 - 1.30 mg/dL    BUN/Creatinine ratio 8 (L) 12 - 20      GFR est AA >60 >60 ml/min/1.73m2    GFR est non-AA >60 >60 ml/min/1.73m2    Calcium 8.3 (L) 8.5 - 10.1 mg/dL    Bilirubin, total 0.2 0.2 - 1.0 mg/dL    AST (SGOT) 13 (L) 15 - 37 U/L    ALT (SGPT) 11 (L) 12 - 78 U/L    Alk. phosphatase 73 45 - 117 U/L    Protein, total 5.9 (L) 6.4 - 8.2 g/dL    Albumin 2.1 (L) 3.5 - 5.0 g/dL    Globulin 3.8 2.0 - 4.0 g/dL    A-G Ratio 0.6 (L) 1.1 - 2.2     CBC WITH AUTOMATED DIFF    Collection Time: 06/13/22  6:04 AM   Result Value Ref Range    WBC 8.4 4.1 - 11.1 K/uL    RBC 3.02 (L) 4.10 - 5.70 M/uL    HGB 8.2 (L) 12.1 - 17.0 g/dL    HCT 26.0 (L) 36.6 - 50.3 %    MCV 86.1 80.0 - 99.0 FL    MCH 27.2 26.0 - 34.0 PG    MCHC 31.5 30.0 - 36.5 g/dL    RDW 15.6 (H) 11.5 - 14.5 %    PLATELET 773 480 - 205 K/uL    MPV 11.4 8.9 - 12.9 FL    NRBC 0.2 (H) 0.0  WBC    ABSOLUTE NRBC 0.02 (H) 0.00 - 0.01 K/uL    NEUTROPHILS 59 32 - 75 %    LYMPHOCYTES 22 12 - 49 %    MONOCYTES 11 5 - 13 %    EOSINOPHILS 7 0 - 7 %    BASOPHILS 0 0 - 1 %    IMMATURE GRANULOCYTES 1 (H) 0 - 0.5 %    ABS. NEUTROPHILS 5.0 1.8 - 8.0 K/UL    ABS. LYMPHOCYTES 1.8 0.8 - 3.5 K/UL    ABS. MONOCYTES 0.9 0.0 - 1.0 K/UL    ABS. EOSINOPHILS 0.6 (H) 0.0 - 0.4 K/UL    ABS. BASOPHILS 0.0 0.0 - 0.1 K/UL    ABS. IMM.  GRANS. 0.1 (H) 0.00 - 0.04 K/UL    DF AUTOMATED     C REACTIVE PROTEIN, QT    Collection Time: 06/13/22  6:04 AM   Result Value Ref Range    C-Reactive protein 5.77 (H) 0.00 - 0.60 mg/dL   GLUCOSE, POC    Collection Time: 06/13/22 11:27 AM   Result Value Ref Range    Glucose (POC) 269 (H) 65 - 117 mg/dL    Performed by Danyel Agudelo         Assessment/Plan:     Active Problems:    Sigmoid volvulus ([facility] [address] (6/4/2022)      Type 2 diabetes mellitus with stage 2 chronic kidney disease, with long-term current use of insulin ([facility] [address] (6/10/2022)      Hypokalemia (6/10/2022)      Foot ulcer due to secondary DM ([facility] [address] (6/10/2022)      Stage 2 chronic kidney disease (6/10/2022)      Hypertension (6/10/2022)        Hospital Course:    Jeronimo Sullivan is a 27-year-old male with a past medical history of schizophrenia, hypertension, DM, and hyperlipidemia who presented with abdominal pain. ED vital signs unremarkable. Initial labs significant for creatinine of 2.02 and lactic of 3. Blood culture pending. CT abdomen/pelvis showed sigmoid volvulus. Patient admitted for further work-up. Patient started on Rocephin. NG tube and rectal tube placed. General surgery and GI consulted. Underwent sigmoidoscopy with decompression on 6/4. KUB showed enteric tube insertion, mild bibasilar atelectasis, persistent distention of small and large bowel. KUB 6/5 showed persistent sigmoid volvulus. CXR negative for acute pulmonary abnormality. Repeat CT abd/pelvis showed persistent sigmoid colon distention tapered narrowing for the sigmoid colon through pelvis again noted mesenteric swirling pelvic small bowel loops filled and distended with oral contrast. Ceftriaxone discontinued and started on Zosyn for empiric coverage. ELVIRA showed mild PAD of bilateral LE. Exploratory laparotomy with a sigmoid colon resection and colostomy placement performed on 6/8. Urine culture negative. NG to remain in place until colostomy is functioning. SLP and nutrition consulted. SLP recommending easy to chew. PT/OT consulted. 1. Sigmoid volvulus  S/p sigmoidoscopy with decompression on 6/4  Exploratory laparotomy with a sigmoid colon resection and colostomy placement performed on 6/8  Continue IVF infusion  GI/General surgery following    2.  Diabetic ulcer of the left heel  Afebrile, no elevated WBC, inflammatory markers pending  S/p IV Zosyn    6/4 blood: negative  ELVIRA showed mild PAD of bilateral LE  Wound culture pending  BCX negative  Wound care following    3. Hypokalemia  Stable  Continue on IV supplementation prn    4. Deaf  Noncommunicative, currently calm  Haldol as needed    5. Diabetes mellitus, type II  A1c 7.8  Continue on Lantus 14 units and sliding scale insulin as needed    6. CKD II  Creatinine stable  Monitor    7. Urinary retention  UA + hematuria and proteinuria  6/9 urine: negative  Monitor urinary output and consider   Sosa cath if needed      DVT Prophylaxis: lovenox  Discharge and disposition barriers: PT/OT eval, K stabilization, IV abx, general surgery clearance, 24 hours      [facility] sister - [phone] - updated on patient condition      Care Plan discussed with: Patient/Family, Nurse and        Total time spent with patient: 35 minutes.

## 2022-06-13 NOTE — PROGRESS NOTES
SPEECH LANGUAGE PATHOLOGY DYSPHAGIA TREATMENT  Patient: Paola Hernandez (3 y.o. male)  Date: 6/13/2022  Diagnosis: Sigmoid volvulus ([facility] [address] [K56.2] <principal problem not specified>  Procedure(s) (LRB):  LAPAROTOMY EXPLORATORY, SIGMOIDECTOMY WITH COLOSTOMY (N/A) 5 Days Post-Op  Precautions: aspiration     ASSESSMENT:  Patient consuming breakfast w/ assistance from PCT. Patient w/ bilateral mits in place. He is deaf. Current diet of easy to chew, thin. Patient w/ functional, timely mastication for easy to chew consistencies. No oral residual after the swallow. Swallow initiation timely for solids and thin liquids. Hyolaryngeal excursion and protraction adequate to digital palpation. No clinical indicators of aspiration present. Oral and pharyngeal swallow WFL at this time. PLAN:  Recommendations and Planned Interventions:  Continue w/ easy to chew, thin. Aspiration precautions. Assist w/ p.o. intake as needed. No further acute ST services to follow. Please re-consult if change in status. SUBJECTIVE:   Patient seen at bedside w/ breakfast tray. Patient getting fed by PCT. OBJECTIVE:     CXR Results  (Last 48 hours)      None          CT Results  (Last 48 hours)      None           Cognitive and Communication Status:  Neurologic State: Alert (open eyes to stimulus)  Orientation Level: Unable to verbalize  Cognition: Follows commands          After treatment:   Patient left in no apparent distress in bed and PCT present    COMMUNICATION/EDUCATION:   Patient was educated regarding his deficit(s) of dysphagia, swallow safety precautions, diet recs and POC. He demonstrated Good understanding as evidenced by head nod. The patient's plan of care including recommendations, planned interventions, and recommended diet changes were discussed with: Certified nursing assistant/patient care technician.    Problem: Dysphagia (Adult)  Goal: *Acute Goals and Plan of Care (Insert Text)  Description: Speech Therapy Goals  Initiated 6/11/2022  -Patient will tolerate EC diet with thin liquids without signs/symptoms of aspiration within 7 day(s). [ ] Not met  [ Toula Shadow  MET   [ ] Progressing  [ ] Taylor Fatima  -Patient will tolerate PO trials with clinician without signs/symptoms of aspiration within 7 day(s). [ ] Not met  [ Toula Shadow  MET   [ ] Progressing  [ ] Taylor Fatima  -Patient will demonstrate understanding of swallow safety precautions and aspiration precautions, diet recs with minimal-mod cues within 7 day(s).         [ ] Not met  [ Toula Shadow  MET   [ ] Progressing  [ ] Discontinue   Outcome: Resolved/Met      Laina Jean, SLP M.SSteffi CCC-SLP  Time Calculation: 8 mins

## 2022-06-13 NOTE — PROGRESS NOTES
Entered room for last care rounds noted patient had removed bilateral mittens and had IV in hand. Cleaned patient up, repositioned/made comfortable. Nursing to replace mittens and replace IV site.

## 2022-06-13 NOTE — PROGRESS NOTES
Called and spoke with patient sisters Daina Hoffman([phone]) and sister Jenni Patiño over telephone. Received choices for IRF/[facility] and choice for 7 different facilities for SNF 1. [facility] 2. [facility] 3. [facility] 4. [facility] 5. [facility] 6. [address] [place] [address] 7. [facility]. Referrals sent via [facility].     CT plan TBD SNF/IRF

## 2022-06-14 LAB
ALBUMIN SERPL-MCNC: 2 G/DL (ref 3.5–5)
ALBUMIN/GLOB SERPL: 0.5 {RATIO} (ref 1.1–2.2)
ALP SERPL-CCNC: 70 U/L (ref 45–117)
ALT SERPL-CCNC: 14 U/L (ref 12–78)
ANION GAP SERPL CALC-SCNC: 5 MMOL/L (ref 5–15)
AST SERPL W P-5'-P-CCNC: 15 U/L (ref 15–37)
BASOPHILS # BLD: 0 K/UL (ref 0–0.1)
BASOPHILS NFR BLD: 0 % (ref 0–1)
BILIRUB SERPL-MCNC: 0.2 MG/DL (ref 0.2–1)
BUN SERPL-MCNC: 9 MG/DL (ref 6–20)
BUN/CREAT SERPL: 8 (ref 12–20)
CA-I BLD-MCNC: 8.2 MG/DL (ref 8.5–10.1)
CHLORIDE SERPL-SCNC: 107 MMOL/L (ref 97–108)
CO2 SERPL-SCNC: 29 MMOL/L (ref 21–32)
CREAT SERPL-MCNC: 1.09 MG/DL (ref 0.7–1.3)
CRP SERPL-MCNC: 3.09 MG/DL (ref 0–0.6)
DIFFERENTIAL METHOD BLD: ABNORMAL
EOSINOPHIL # BLD: 0.4 K/UL (ref 0–0.4)
EOSINOPHIL NFR BLD: 5 % (ref 0–7)
ERYTHROCYTE [DISTWIDTH] IN BLOOD BY AUTOMATED COUNT: 15.6 % (ref 11.5–14.5)
GLOBULIN SER CALC-MCNC: 3.8 G/DL (ref 2–4)
GLUCOSE BLD STRIP.AUTO-MCNC: 136 MG/DL (ref 65–117)
GLUCOSE BLD STRIP.AUTO-MCNC: 166 MG/DL (ref 65–117)
GLUCOSE BLD STRIP.AUTO-MCNC: 177 MG/DL (ref 65–117)
GLUCOSE BLD STRIP.AUTO-MCNC: 181 MG/DL (ref 65–117)
GLUCOSE BLD STRIP.AUTO-MCNC: 186 MG/DL (ref 65–117)
GLUCOSE BLD STRIP.AUTO-MCNC: 240 MG/DL (ref 65–117)
GLUCOSE SERPL-MCNC: 133 MG/DL (ref 65–100)
HCT VFR BLD AUTO: 25.8 % (ref 36.6–50.3)
HGB BLD-MCNC: 8 G/DL (ref 12.1–17)
IMM GRANULOCYTES # BLD AUTO: 0.1 K/UL (ref 0–0.04)
IMM GRANULOCYTES NFR BLD AUTO: 2 % (ref 0–0.5)
LYMPHOCYTES # BLD: 1.4 K/UL (ref 0.8–3.5)
LYMPHOCYTES NFR BLD: 18 % (ref 12–49)
MCH RBC QN AUTO: 26.8 PG (ref 26–34)
MCHC RBC AUTO-ENTMCNC: 31 G/DL (ref 30–36.5)
MCV RBC AUTO: 86.3 FL (ref 80–99)
MONOCYTES # BLD: 0.8 K/UL (ref 0–1)
MONOCYTES NFR BLD: 10 % (ref 5–13)
NEUTS SEG # BLD: 5.2 K/UL (ref 1.8–8)
NEUTS SEG NFR BLD: 65 % (ref 32–75)
NRBC # BLD: 0.02 K/UL (ref 0–0.01)
NRBC BLD-RTO: 0.3 PER 100 WBC
PERFORMED BY, TECHID: ABNORMAL
PLATELET # BLD AUTO: 286 K/UL (ref 150–400)
PMV BLD AUTO: 11.3 FL (ref 8.9–12.9)
POTASSIUM SERPL-SCNC: 3.5 MMOL/L (ref 3.5–5.1)
PROT SERPL-MCNC: 5.8 G/DL (ref 6.4–8.2)
RBC # BLD AUTO: 2.99 M/UL (ref 4.1–5.7)
SODIUM SERPL-SCNC: 141 MMOL/L (ref 136–145)
WBC # BLD AUTO: 7.9 K/UL (ref 4.1–11.1)

## 2022-06-14 PROCEDURE — 74011250636 HC RX REV CODE- 250/636: Performed by: SURGERY

## 2022-06-14 PROCEDURE — 74011250636 HC RX REV CODE- 250/636: Performed by: STUDENT IN AN ORGANIZED HEALTH CARE EDUCATION/TRAINING PROGRAM

## 2022-06-14 PROCEDURE — 85025 COMPLETE CBC W/AUTO DIFF WBC: CPT

## 2022-06-14 PROCEDURE — 99024 POSTOP FOLLOW-UP VISIT: CPT | Performed by: SURGERY

## 2022-06-14 PROCEDURE — 97530 THERAPEUTIC ACTIVITIES: CPT

## 2022-06-14 PROCEDURE — 82962 GLUCOSE BLOOD TEST: CPT

## 2022-06-14 PROCEDURE — 86140 C-REACTIVE PROTEIN: CPT

## 2022-06-14 PROCEDURE — 97161 PT EVAL LOW COMPLEX 20 MIN: CPT

## 2022-06-14 PROCEDURE — 36415 COLL VENOUS BLD VENIPUNCTURE: CPT

## 2022-06-14 PROCEDURE — 80053 COMPREHEN METABOLIC PANEL: CPT

## 2022-06-14 PROCEDURE — 97165 OT EVAL LOW COMPLEX 30 MIN: CPT

## 2022-06-14 PROCEDURE — 65270000029 HC RM PRIVATE

## 2022-06-14 PROCEDURE — 74011636637 HC RX REV CODE- 636/637: Performed by: SURGERY

## 2022-06-14 RX ADMIN — DEXTROSE MONOHYDRATE, SODIUM CHLORIDE, SODIUM LACTATE, POTASSIUM CHLORIDE, CALCIUM CHLORIDE: 5; 600; 310; 179; 20 INJECTION, SOLUTION INTRAVENOUS at 19:55

## 2022-06-14 RX ADMIN — INSULIN LISPRO 2 UNITS: 100 INJECTION, SOLUTION INTRAVENOUS; SUBCUTANEOUS at 01:46

## 2022-06-14 RX ADMIN — INSULIN GLARGINE 14 UNITS: 100 INJECTION, SOLUTION SUBCUTANEOUS at 09:00

## 2022-06-14 RX ADMIN — INSULIN LISPRO 2 UNITS: 100 INJECTION, SOLUTION INTRAVENOUS; SUBCUTANEOUS at 11:49

## 2022-06-14 RX ADMIN — INSULIN LISPRO 2 UNITS: 100 INJECTION, SOLUTION INTRAVENOUS; SUBCUTANEOUS at 17:51

## 2022-06-14 RX ADMIN — WHITE PETROLATUM,ZINC OXIDE: 20; 75 PASTE TOPICAL at 08:44

## 2022-06-14 RX ADMIN — WHITE PETROLATUM,ZINC OXIDE: 20; 75 PASTE TOPICAL at 21:00

## 2022-06-14 RX ADMIN — INSULIN LISPRO 2 UNITS: 100 INJECTION, SOLUTION INTRAVENOUS; SUBCUTANEOUS at 23:13

## 2022-06-14 RX ADMIN — COLLAGENASE SANTYL: 250 OINTMENT TOPICAL at 08:42

## 2022-06-14 RX ADMIN — ENOXAPARIN SODIUM 40 MG: 100 INJECTION SUBCUTANEOUS at 08:42

## 2022-06-14 NOTE — PROGRESS NOTES
Patient examined this morning. Patient was comfortable. Colostomy is working. Abdomen soft. Please advance regular diet. I examined the patient's left heel. Patient left heel has foul-smelling gangrenous tissue noted. I covered the wound with a iodine soaked 4 x 4 gauze and Kerlix roll. Patient will need wound debridement on the left heel. .  Patient's ABIs normal most likely abnormally elevated possibly also need angiographic examination of the left leg as well. I will talk to the family about this.

## 2022-06-14 NOTE — PROGRESS NOTES
Problem: Self Care Deficits Care Plan (Adult)  Goal: *Acute Goals and Plan of Care (Insert Text)  Description: Pt stated goal -> pt nonverbal unable to state goal at this time  Pt will be min A sup<->sit in prep for EOB ADL's  Pt will be max A  LB dressing EOB level  Pt will be SBA  sit EOB 10 minutes in prep for EOB ADL's  Pt will be SBA  grooming EOB level  Pt will be mod A  sit<-> prep for toilet transfer  Pt will be mod A  BSC transfer with LRAD  Pt will be mod A  toileting/cloth mgmt LRAD  Pt will be min A UB bathing/dressing EOB  Pt will be SBA mikey UE HEP in prep for self care tasks    Outcome: Not Met     OCCUPATIONAL THERAPY EVALUATION  Patient: Quinten Alpers (76 y.o. male)  Date: 6/14/2022  Primary Diagnosis: Sigmoid volvulus ([address] [K56.2]  Procedure(s) (LRB):  LAPAROTOMY EXPLORATORY, SIGMOIDECTOMY WITH COLOSTOMY (N/A) 6 Days Post-Op   Precautions: falls       ASSESSMENT  Pt is 67 y/o male came [facility] from [place] [address] with abdominal distention, nausea and vomiting and adm 6/4/2022 for   Left foot ulcer, hypokalemia, CKD stage II, urinary retention, sigmoid volvulus s/p sigmoidoscopy and decompression (6/4/2022) and now s/p exploratory laparotomy, lysis of adhesions of small intestine attached to the large intestine and partial sigmoid colon resection of the involved volvulus and end colostomy placement (6/8/2022 by Dr. Radha Guzman). Pt has hx of BPH, CKD, DM, HLD, schizophrenia, anemia, non speaking deaf. Pt received semi supine in bed, alert and agreeable for OT/PT eval/tx (per chart review pt nonverbal and deaf, unable to follow written communication however following visual commands/cueing to participate with therapy). Pt unable to provide PLOF information however per chart review, pt from LTC at [place] [address].      Pt currently presents with decreased balance, decreased activity tolerance, generalized weakness and increased need for assist with self care (SBA simple grooming semi supine in bed, total A toileting/cloth mgmt simulated, total A LB dressing simulated EOB) and functional transfers/mobility (min A rolling, max Ax2 sup<->sit and scooting EOB, max Ax2 attempt sit<->stand able to clear bed however unable to come up to complete stance with gait belt and RW). Pt would benefit from skilled OT services while at [facility] in order to increase safety and independence with self care and functional transfers/mobility. Recommend discharge to SNF when medically appropriate. Other factors to consider for discharge: time since onset, unknown PLOF        PLAN :  Recommendations and Planned Interventions: self care training, functional mobility training, therapeutic exercise, balance training, therapeutic activities, endurance activities, patient education and home safety training    Frequency/Duration: Patient will be followed by occupational therapy 2-3x/week to address goals. Recommendation for discharge: (in order for the patient to meet his/her long term goals)  Dada Bo    This discharge recommendation:  Has been made in collaboration with the attending provider and/or case management    IF patient discharges home will need the following DME: TBD       SUBJECTIVE:   Patient nonverbal however showing thumbs up after completing facial hygiene    OBJECTIVE DATA SUMMARY:   HISTORY:   Past Medical History:   Diagnosis Date    Anemia     BPH (benign prostatic hyperplasia)     Chronic kidney disease     [address] [address]     Hypercholesteremia     Hyperlipidemia     Neurological disorder     Nonspeaking deaf    History reviewed. No pertinent surgical history.     Expanded or extensive additional review of patient history:     Home Situation  Home Environment: Long term care  [address] [place] [address] Name: [facility] 89: Other Family Member(s)  Patient Expects to be Discharged to[de-identified] Long Term Care    PLOF: Pt PLOF unknown at this time     EXAMINATION OF PERFORMANCE DEFICITS:  Cognitive/Behavioral Status:  Neurologic State: Alert  Orientation Level: Unable to verbalize     Hearing:   Per chart review, pt is deaf    Range of Motion:  AROM: Within functional limits  PROM: Within functional limits     Strength:  Strength: Generally decreased, functional (mikey UE grossly observed to be 3+/5)     Balance:  Sitting: Impaired; With support  Sitting - Static: Good (unsupported)  Sitting - Dynamic: Fair (occasional)  Standing: Impaired;Pull to stand; With support (unable to come up to complete stance poor balance)    Functional Mobility and Transfers for ADLs:  Bed Mobility:  Rolling: Minimum assistance  Supine to Sit: Maximum assistance;Assist x2  Sit to Supine: Maximum assistance;Assist x2  Scooting: Maximum assistance;Assist x2 (EOB)    Transfers:  Sit to Stand: Maximum assistance;Assist x2 (unable to come up to complete stance)  Stand to Sit: Maximum assistance;Assist x2    ADL Assessment:     Oral Facial Hygiene/Grooming: Stand-by assistance    Toileting: Total assistance (simulated semi supine in bed)     ADL Intervention and task modifications:     Grooming  Grooming Assistance: Stand-by assistance  Position Performed:  (semi supine in bed)    Toileting  Toileting Assistance: Total assistance(dependent) (bed level simulated)  Clothing Management: Total assistance (dependent)    [address] [facility] [address] [address] AM-PACTM \"6 Clicks\"                                                       Daily Activity Inpatient Short Form  How much help from another person does the patient currently need. .. Total; A Lot A Little None   1. Putting on and taking off regular lower body clothing? [x]  1 []  2 []  3 []  4   2. Bathing (including washing, rinsing, drying)? []  1 [x]  2 []  3 []  4   3. Toileting, which includes using toilet, bedpan or urinal? [x] 1 []  2 []  3 []  4   4. Putting on and taking off regular upper body clothing? []  1 [x]  2 []  3 []  4   5.   Taking care of personal grooming such as brushing teeth? []  1 []  2 [x]  3 []  4   6. Eating meals? []  1 []  2 [x]  3 []  4   © 2007, Trustees of [address] [place] [address] [address] [address], under license to [employer]. All rights reserved     Score: 12/24     Interpretation of Tool:  Represents clinically-significant functional categories (i.e. Activities of daily living). Percentage of Impairment CH    0%   CI    1-19% CJ    20-39% CK    40-59% CL    60-79% CM    80-99% CN     100%   [facility]  Score 6-24 24 23 20-22 15-19 10-14 7-9 6        Occupational Therapy Evaluation Charge Determination   History Examination Decision-Making   LOW Complexity : Brief history review  MEDIUM Complexity : 3-5 performance deficits relating to physical, cognitive , or psychosocial skils that result in activity limitations and / or participation restrictions MEDIUM Complexity : Patient may present with comorbidities that affect occupational performnce. Miniml to moderate modification of tasks or assistance (eg, physical or verbal ) with assesment(s) is necessary to enable patient to complete evaluation       Based on the above components, the patient evaluation is determined to be of the following complexity level: LOW   Pain Rating:  No pain reported at rest with no grimace noted    Activity Tolerance:   Fair and requires rest breaks  Please refer to the flowsheet for vital signs taken during this treatment. After treatment patient left in no apparent distress:    Supine in bed, Call bell within reach, Bed / chair alarm activated and Side rails x 3    COMMUNICATION/EDUCATION:   The patients plan of care was discussed with: Physical therapist and Registered nurse. PT/OT sessions occurred together for increased safety of pt and clinician. Patient is unable to participate in goal setting and plan of care. as pt is nonverbal/deaf    This patients plan of care is appropriate for delegation to [facility].     Thank you for this referral.  Ling Camacho  Time Calculation: 22 mins

## 2022-06-14 NOTE — PROGRESS NOTES
Problem: Pressure Injury - Risk of  Goal: *Prevention of pressure injury  Description: Document Jet Scale and appropriate interventions in the flowsheet. Outcome: Progressing Towards Goal  Note: Pressure Injury Interventions:  Sensory Interventions: Assess changes in LOC,Float heels,Minimize linen layers    Moisture Interventions: Absorbent underpads,Apply protective barrier, creams and emollients,Internal/External urinary devices    Activity Interventions: Assess need for specialty bed,Pressure redistribution bed/mattress(bed type),PT/OT evaluation    Mobility Interventions: Assess need for specialty bed,PT/OT evaluation    Nutrition Interventions: Discuss nutritional consult with provider    Friction and Shear Interventions: Apply protective barrier, creams and emollients,Foam dressings/transparent film/skin sealants                Problem: Falls - Risk of  Goal: *Absence of Falls  Description: Document Fabienne Fall Risk and appropriate interventions in the flowsheet.   Outcome: Progressing Towards Goal  Note: Fall Risk Interventions:       Mentation Interventions: Adequate sleep, hydration, pain control    Medication Interventions: Bed/chair exit alarm    Elimination Interventions: Bed/chair exit alarm              Problem: Impaired Skin Integrity/Pressure Injury Treatment  Goal: *Improvement of Existing Pressure Injury  Outcome: Progressing Towards Goal

## 2022-06-14 NOTE — PROGRESS NOTES
PHYSICAL THERAPY EVALUATION  Patient: Hayde Shah (82 y.o. male)  Date: 6/14/2022  Primary Diagnosis: Sigmoid volvulus ([facility] [address] [K56.2]  Procedure(s) (LRB):  LAPAROTOMY EXPLORATORY, SIGMOIDECTOMY WITH COLOSTOMY (N/A) 6 Days Post-Op   Precautions: fall  ASSESSMENT  This is a 66 yrs old male adm came [facility] from [facility] with abd distension , nausea and vomiting and adm 6/4/2022 fro left foot ulcer ,hypokalemia ,CKDstage 2,urinary retention,sigmoidvolvulus s/p sigmoidectomy and decompression 6/4/2022 and now s/p exploratory lap. Lysis of adhesions of small intestine attached to large intestine and partial sigmoid colon resection of the involved volvulus and end colostomy placement (6/8/2022 by DR VAZQUEZ). Pt with PMH of BPH,CKD,DM,HLD,schizophrenia,anemia,non speaking deaf. Patient received semi supine in bed ,alert and agreeabl for PT. OT eval.tx.Patient unable to follow written command. Patient unable to provide PLOF information however per chart review,pt from LTC at [facility]. Patient  Currently present with decreased balance,decreased activity tolerance ,gen weakness and increased need for assist with mobility and transfers. Patient unable to stand with max assist of 2 with even blocking the knees  and gait belt. Patient exhibits decreased range of motion in ATA les. Patient trying to reach for recliner it meant he is able to transfer to the chair prior to this. Patient was assited back to bed Patient was able to assist with scooting up in bed. Other factors to consider for discharge:SNF/LTC  Patient will benefit from skilled therapy intervention to address the above noted impairments. PLAN :  Recommendations and Planned Interventions: bed mobility training, transfer training, patient and family training/education and therapeutic activities      Frequency/Duration: Patient will be followed by physical therapy:  1-2x/week to address goals.     Recommendation for discharge: (in order for the patient to meet his/her long term goals)  [address]    This discharge recommendation:  Has been made in collaboration with the attending provider and/or case management    IF patient discharges home will need the following DME: to be determined (TBD)         SUBJECTIVE:   Patient stated patient is nonverbal    OBJECTIVE DATA SUMMARY:   HISTORY:    Past Medical History:   Diagnosis Date    Anemia     BPH (benign prostatic hyperplasia)     Chronic kidney disease     Diabetes ([address]     Hypercholesteremia     Hyperlipidemia     Neurological disorder     Nonspeaking deaf    History reviewed. No pertinent surgical history. Personal factors and/or comorbidities impacting plan of care:     Home Situation  Home Environment: Long term care  Care Facility Name: [facility] 89: Other Family Member(s)  Patient Expects to be Discharged to[de-identified] Long Term Care    EXAMINATION/PRESENTATION/DECISION MAKING:   Critical Behavior:  Neurologic State: Alert  Orientation Level: Unable to verbalize  Cognition: Impaired decision making     Range Of Motion:  AROM: Generally decreased, functional           PROM: Generally decreased, functional           Strength:    Strength: Within functional limits                     Functional Mobility:  Bed Mobility:  Rolling: Minimum assistance  Supine to Sit: Maximum assistance;Assist x2  Sit to Supine: Maximum assistance;Assist x2  Scooting: Maximum assistance;Assist x2  Transfers:  Sit to Stand: Maximum assistance;Assist x2  Stand to Sit: Maximum assistance;Assist x2                       Balance:   Sitting: Impaired; With support  Sitting - Static: Good (unsupported)  Sitting - Dynamic: Fair (occasional)  Standing: Impaired;Pull to stand; With support  Standing - Static: Poor;Constant support  Standing - Dynamic : Poor;Constant support                  Functional Measure:  [facility] AM-PAC 6 Clicks         Basic Mobility Inpatient Short Form  How much difficulty does the patient currently have. .. Unable A Lot A Little None   1. Turning over in bed (including adjusting bedclothes, sheets and blankets)? [] 1   [x] 2   [] 3   [] 4   2. Sitting down on and standing up from a chair with arms ( e.g., wheelchair, bedside commode, etc.)   [x] 1   [] 2   [] 3   [] 4   3. Moving from lying on back to sitting on the side of the bed? [] 1   [x] 2   [] 3   [] 4          How much help from another person does the patient currently need. .. Total A Lot A Little None   4. Moving to and from a bed to a chair (including a wheelchair)? [x] 1   [] 2   [] 3   [] 4   5. Need to walk in hospital room? [x] 1   [] 2   [] 3   [] 4   6. Climbing 3-5 steps with a railing? [x] 1   [] 2   [] 3   [] 4   © , Trustees of [facility] [employer], under license to [employer]. All rights reserved     Score:  Initial:  Most Recent: X (Date:2022 )   Interpretation of Tool:  Represents activities that are increasingly more difficult (i.e. Bed mobility, Transfers, Gait). Score 24 23 22-20 19-15 14-10 9-7 6   Modifier CH CI CJ CK CL CM CN            Physical Therapy Evaluation Charge Determination   History Examination Presentation Decision-Making   LOW Complexity : Zero comorbidities / personal factors that will impact the outcome / POC LOW Complexity : 1-2 Standardized tests and measures addressing body structure, function, activity limitation and / or participation in recreation  LOW Complexity : Stable, uncomplicated  LOW Complexity : FOTO score of       Based on the above components, the patient evaluation is determined to be of the following complexity level: LOW     Pain Ratin/10    Activity Tolerance:   Fair and Poor  Please refer to the flowsheet for vital signs taken during this treatment.     After treatment patient left in no apparent distress:   Supine in bed, Call bell within reach, Bed / chair alarm activated and Side rails x 3  Description  Patient will move from supine to sit and sit to supine , scoot up and down, and roll side to side in bed with moderate assistance within 7 day(s). Patient will transfer from bed to chair and chair to bed with maximal assistance using the least restrictive device within 7 day(s). Patient will improve static standing balance to maximal assistance within 1 week(s). COMMUNICATION/EDUCATION:   The patients plan of care was discussed with: Occupational therapist, Registered nurse and Case management. Fall prevention education was provided and the patient/caregiver indicated understanding., Patient/family have participated as able in goal setting and plan of care. and Patient/family agree to work toward stated goals and plan of care.     Thank you for this referral.  Amanda Melchor PT   Time Calculation: 20 mins

## 2022-06-14 NOTE — PROGRESS NOTES
Hospitalist Progress Note    Subjective:   Daily Progress Note: 6/14/2022 9:06 AM    Patient examined alert and nonverbal lying in bed. No acute distress noted on examination. No overnight events reported. Diet advanced, tolerating well. Mittens intact.     Current Facility-Administered Medications   Medication Dose Route Frequency    dextrose 5% - LR with KCl 20 mEq/L infusion   IntraVENous CONTINUOUS    ondansetron (ZOFRAN ODT) tablet 4 mg  4 mg Oral Q8H PRN    Or    ondansetron (ZOFRAN) injection 4 mg  4 mg IntraVENous Q6H PRN    enoxaparin (LOVENOX) injection 40 mg  40 mg SubCUTAneous DAILY    HYDROmorphone (DILAUDID) syringe 0.25 mg  0.25 mg IntraVENous Q3H PRN    Or    HYDROmorphone (DILAUDID) injection 1 mg  1 mg IntraVENous Q4H PRN    zinc oxide-white petrolatum 20-75 % topical paste   Topical BID    collagenase (SANTYL) 250 unit/gram ointment   Topical DAILY    insulin lispro (HUMALOG) injection   SubCUTAneous Q6H    glucose chewable tablet 16 g  4 Tablet Oral PRN    glucagon (GLUCAGEN) injection 1 mg  1 mg IntraMUSCular PRN    dextrose 10% infusion 0-250 mL  0-250 mL IntraVENous PRN    labetaloL (NORMODYNE;TRANDATE) 20 mg/4 mL (5 mg/mL) injection 10 mg  10 mg IntraVENous Q4H PRN    acetaminophen (TYLENOL) tablet 650 mg  650 mg Oral Q6H PRN    Or    acetaminophen (TYLENOL) suppository 650 mg  650 mg Rectal Q6H PRN    polyethylene glycol (MIRALAX) packet 17 g  17 g Oral DAILY PRN    haloperidol lactate (HALDOL) injection 2 mg  2 mg IntraMUSCular Q4H PRN    insulin glargine (LANTUS) injection 14 Units  14 Units SubCUTAneous DAILY        Review of Systems  Review of Systems   Unable to perform ROS: Acuity of condition            Objective:     Visit Vitals  BP (!) 153/87 (BP 1 Location: Right upper arm, BP Patient Position: At rest)   Pulse 79   Temp 98 °F (36.7 °C)   Resp 18   Ht 5' 10\" (1.778 m)   Wt 83.9 kg (185 lb)   SpO2 100%   BMI 26.54 kg/m²      O2 Device: None (Room air)    Temp (24hrs), Av.4 °F (36.9 °C), Min:98 °F (36.7 °C), Max:98.7 °F (37.1 °C)      No intake/output data recorded.  1901 -  0700  In: 7669 [P.O.:250; I.V.:1355]  Out: 3450 [Urine:3100; Drains:100]    Recent Results (from the past 24 hour(s))   GLUCOSE, POC    Collection Time: 22 11:27 AM   Result Value Ref Range    Glucose (POC) 269 (H) 65 - 117 mg/dL    Performed by Lindsey Gonzales    GLUCOSE, POC    Collection Time: 22  5:29 PM   Result Value Ref Range    Glucose (POC) 166 (H) 65 - 117 mg/dL    Performed by Dewayne Johnson RN (Traveler)    GLUCOSE, POC    Collection Time: 22 12:06 AM   Result Value Ref Range    Glucose (POC) 240 (H) 65 - 117 mg/dL    Performed by Carmen Lovell    GLUCOSE, POC    Collection Time: 22  1:35 AM   Result Value Ref Range    Glucose (POC) 177 (H) 65 - 117 mg/dL    Performed by Soo Gregory    GLUCOSE, POC    Collection Time: 22  6:02 AM   Result Value Ref Range    Glucose (POC) 136 (H) 65 - 117 mg/dL    Performed by Carmen Lovell    METABOLIC PANEL, COMPREHENSIVE    Collection Time: 22  7:04 AM   Result Value Ref Range    Sodium 141 136 - 145 mmol/L    Potassium 3.5 3.5 - 5.1 mmol/L    Chloride 107 97 - 108 mmol/L    CO2 29 21 - 32 mmol/L    Anion gap 5 5 - 15 mmol/L    Glucose 133 (H) 65 - 100 mg/dL    BUN 9 6 - 20 mg/dL    Creatinine 1.09 0.70 - 1.30 mg/dL    BUN/Creatinine ratio 8 (L) 12 - 20      GFR est AA >60 >60 ml/min/1.73m2    GFR est non-AA >60 >60 ml/min/1.73m2    Calcium 8.2 (L) 8.5 - 10.1 mg/dL    Bilirubin, total 0.2 0.2 - 1.0 mg/dL    AST (SGOT) 15 15 - 37 U/L    ALT (SGPT) 14 12 - 78 U/L    Alk.  phosphatase 70 45 - 117 U/L    Protein, total 5.8 (L) 6.4 - 8.2 g/dL    Albumin 2.0 (L) 3.5 - 5.0 g/dL    Globulin 3.8 2.0 - 4.0 g/dL    A-G Ratio 0.5 (L) 1.1 - 2.2     CBC WITH AUTOMATED DIFF    Collection Time: 22  7:04 AM   Result Value Ref Range    WBC 7.9 4.1 - 11.1 K/uL    RBC 2.99 (L) 4.10 - 5.70 M/uL    HGB 8.0 (L) 12.1 - 17.0 g/dL    HCT 25.8 (L) 36.6 - 50.3 %    MCV 86.3 80.0 - 99.0 FL    MCH 26.8 26.0 - 34.0 PG    MCHC 31.0 30.0 - 36.5 g/dL    RDW 15.6 (H) 11.5 - 14.5 %    PLATELET 412 647 - 156 K/uL    MPV 11.3 8.9 - 12.9 FL    NRBC 0.3 (H) 0.0  WBC    ABSOLUTE NRBC 0.02 (H) 0.00 - 0.01 K/uL    NEUTROPHILS 65 32 - 75 %    LYMPHOCYTES 18 12 - 49 %    MONOCYTES 10 5 - 13 %    EOSINOPHILS 5 0 - 7 %    BASOPHILS 0 0 - 1 %    IMMATURE GRANULOCYTES 2 (H) 0 - 0.5 %    ABS. NEUTROPHILS 5.2 1.8 - 8.0 K/UL    ABS. LYMPHOCYTES 1.4 0.8 - 3.5 K/UL    ABS. MONOCYTES 0.8 0.0 - 1.0 K/UL    ABS. EOSINOPHILS 0.4 0.0 - 0.4 K/UL    ABS. BASOPHILS 0.0 0.0 - 0.1 K/UL    ABS. IMM. GRANS. 0.1 (H) 0.00 - 0.04 K/UL    DF AUTOMATED     C REACTIVE PROTEIN, QT    Collection Time: 06/14/22  7:04 AM   Result Value Ref Range    C-Reactive protein 3.09 (H) 0.00 - 0.60 mg/dL        XR CHEST SNGL V   Final Result   1. The nasogastric tube is in satisfactory position. 2.  There is mild hypoventilation and minimal atelectasis within the lung bases. The lungs are otherwise clear   3. This examination is negative for new pulmonary pathology or additional   interval changes. XR CHEST PORT   Final Result   NG tube directed toward the distal stomach. No acute cardiopulmonary   finding. ANKLE BRACHIAL INDEX   Final Result      XR CHEST PORT   Final Result   Partially imaged NG tube coursing below the left hemidiaphragm,   terminating in the stomach. CT ABD PELV WO CONT   Final Result   Persistent sigmoid colon distention. Tapered narrowing for the sigmoid colon through pelvis again noting mesenteric   swirling. Findings again concerning for sigmoid volvulus. Pelvic small bowel loops are oral contrast filled and distended. (oral contrast   has not reached to the level of the colon at the time of imaging). No free intraperitoneal air. No ascites. Report called to 2E. Spoke with nurse Yeyo Benitez.       XR CHEST PORT   Final Result Negative exam.      XR ABD (KUB)   Final Result   Persistent sigmoid volvulus. XR CHEST PORT   Final Result   Diminished bibasilar atelectasis. XR ABD ACUTE W 1 V CHEST   Final Result   1. Enteric tube insertion. 2. Persistent distention of small and large bowel. 3. Mild bibasilar atelectasis. CT ABD PELV WO CONT   Final Result   Sigmoid volvulus. PHYSICAL EXAM:    Physical Exam  Vitals and nursing note reviewed. HENT:      Head: Normocephalic and atraumatic. Ears:      Comments: deaf  Cardiovascular:      Rate and Rhythm: Normal rate and regular rhythm. Pulmonary:      Effort: No respiratory distress. Breath sounds: No wheezing. Abdominal:      General: Bowel sounds are normal. There is no distension. Palpations: Abdomen is soft. Tenderness: There is no abdominal tenderness. Comments: Vertical incision in epigastric area dressing clean, dry, and intact with CELESTINO drain in place in RLQ  Colostomy present in LLQ with light brown liquid stool within     Genitourinary:     Comments: Sosa not present  Musculoskeletal:      Right lower leg: No edema. Left lower leg: No edema. Skin:     Capillary Refill: Capillary refill takes less than 2 seconds. Comments: Left heel: dressing intact, foul smelling  Sacral wounds present   Neurological:      Mental Status: He is alert.       Comments: Patient appears to be interactive with yes/no questions but is difficult to ascertain cognition    Psychiatric:      Comments: Unable to assess          Data Review    Recent Results (from the past 24 hour(s))   GLUCOSE, POC    Collection Time: 06/13/22 11:27 AM   Result Value Ref Range    Glucose (POC) 269 (H) 65 - 117 mg/dL    Performed by Salvador Phillips    GLUCOSE, POC    Collection Time: 06/13/22  5:29 PM   Result Value Ref Range    Glucose (POC) 166 (H) 65 - 117 mg/dL    Performed by Caleb Owens RN (Traveler)    GLUCOSE, POC    Collection Time: 06/14/22 12:06 AM   Result Value Ref Range    Glucose (POC) 240 (H) 65 - 117 mg/dL    Performed by Prescott Claude    GLUCOSE, POC    Collection Time: 06/14/22  1:35 AM   Result Value Ref Range    Glucose (POC) 177 (H) 65 - 117 mg/dL    Performed by Aleisha Treviño    GLUCOSE, POC    Collection Time: 06/14/22  6:02 AM   Result Value Ref Range    Glucose (POC) 136 (H) 65 - 117 mg/dL    Performed by Prescott Claude    METABOLIC PANEL, COMPREHENSIVE    Collection Time: 06/14/22  7:04 AM   Result Value Ref Range    Sodium 141 136 - 145 mmol/L    Potassium 3.5 3.5 - 5.1 mmol/L    Chloride 107 97 - 108 mmol/L    CO2 29 21 - 32 mmol/L    Anion gap 5 5 - 15 mmol/L    Glucose 133 (H) 65 - 100 mg/dL    BUN 9 6 - 20 mg/dL    Creatinine 1.09 0.70 - 1.30 mg/dL    BUN/Creatinine ratio 8 (L) 12 - 20      GFR est AA >60 >60 ml/min/1.73m2    GFR est non-AA >60 >60 ml/min/1.73m2    Calcium 8.2 (L) 8.5 - 10.1 mg/dL    Bilirubin, total 0.2 0.2 - 1.0 mg/dL    AST (SGOT) 15 15 - 37 U/L    ALT (SGPT) 14 12 - 78 U/L    Alk. phosphatase 70 45 - 117 U/L    Protein, total 5.8 (L) 6.4 - 8.2 g/dL    Albumin 2.0 (L) 3.5 - 5.0 g/dL    Globulin 3.8 2.0 - 4.0 g/dL    A-G Ratio 0.5 (L) 1.1 - 2.2     CBC WITH AUTOMATED DIFF    Collection Time: 06/14/22  7:04 AM   Result Value Ref Range    WBC 7.9 4.1 - 11.1 K/uL    RBC 2.99 (L) 4.10 - 5.70 M/uL    HGB 8.0 (L) 12.1 - 17.0 g/dL    HCT 25.8 (L) 36.6 - 50.3 %    MCV 86.3 80.0 - 99.0 FL    MCH 26.8 26.0 - 34.0 PG    MCHC 31.0 30.0 - 36.5 g/dL    RDW 15.6 (H) 11.5 - 14.5 %    PLATELET 606 092 - 340 K/uL    MPV 11.3 8.9 - 12.9 FL    NRBC 0.3 (H) 0.0  WBC    ABSOLUTE NRBC 0.02 (H) 0.00 - 0.01 K/uL    NEUTROPHILS 65 32 - 75 %    LYMPHOCYTES 18 12 - 49 %    MONOCYTES 10 5 - 13 %    EOSINOPHILS 5 0 - 7 %    BASOPHILS 0 0 - 1 %    IMMATURE GRANULOCYTES 2 (H) 0 - 0.5 %    ABS. NEUTROPHILS 5.2 1.8 - 8.0 K/UL    ABS. LYMPHOCYTES 1.4 0.8 - 3.5 K/UL    ABS. MONOCYTES 0.8 0.0 - 1.0 K/UL    ABS.  EOSINOPHILS 0.4 0.0 - 0.4 K/UL ABS. BASOPHILS 0.0 0.0 - 0.1 K/UL    ABS. IMM. GRANS. 0.1 (H) 0.00 - 0.04 K/UL    DF AUTOMATED     C REACTIVE PROTEIN, QT    Collection Time: 06/14/22  7:04 AM   Result Value Ref Range    C-Reactive protein 3.09 (H) 0.00 - 0.60 mg/dL        Assessment/Plan:     Principal Problem:    Sigmoid volvulus ([address] (6/4/2022)    Active Problems:    Type 2 diabetes mellitus with stage 2 chronic kidney disease, with long-term current use of insulin ([address] (6/10/2022)      Hypokalemia (6/10/2022)      Foot ulcer due to secondary DM ([address] (6/10/2022)      Stage 2 chronic kidney disease (6/10/2022)      Hypertension (6/10/2022)        Hospital Course:    Yulissa Tolliver is a 78-year-old male with a past medical history of schizophrenia, hypertension, DM, and hyperlipidemia who presented with abdominal pain. ED vital signs unremarkable. Initial labs significant for creatinine of 2.02 and lactic of 3. Blood culture pending. CT abdomen/pelvis showed sigmoid volvulus. Patient admitted for further work-up. Patient started on Rocephin. NG tube and rectal tube placed. General surgery and GI consulted. Underwent sigmoidoscopy with decompression on 6/4. KUB showed enteric tube insertion, mild bibasilar atelectasis, persistent distention of small and large bowel. KUB 6/5 showed persistent sigmoid volvulus. CXR negative for acute pulmonary abnormality. Repeat CT abd/pelvis showed persistent sigmoid colon distention tapered narrowing for the sigmoid colon through pelvis again noted mesenteric swirling pelvic small bowel loops filled and distended with oral contrast. Ceftriaxone discontinued and started on Zosyn for empiric coverage. ELVIRA showed mild PAD of bilateral LE. Exploratory laparotomy with a sigmoid colon resection and colostomy placement performed on 6/8. Urine culture negative. NG to remain in place until colostomy is functioning. SLP and nutrition consulted. SLP recommending easy to chew. PT/OT consulted.         1. Sigmoid volvulus  S/p sigmoidoscopy with decompression on 6/4  Exploratory laparotomy with a sigmoid colon resection and colostomy placement performed on 6/8  Continue IVF infusion  GI/General surgery following    2. Diabetic ulcer of the left heel  Afebrile, no elevated WBC, inflammatory markers pending  S/p IV Zosyn    6/4 blood: negative  ELVIRA showed mild PAD of bilateral LE  Wound culture pending  BCX negative  Wound care following  Surgical debridement    3. Hypokalemia  Stable  Continue on IV supplementation prn    4. Deaf  Noncommunicative, currently calm  Haldol as needed    5. Diabetes mellitus, type II  A1c 7.8  Continue on Lantus 14 units and sliding scale insulin as needed    6. CKD II  Creatinine stable  Monitor    7. Urinary retention  UA + hematuria and proteinuria  6/9 urine: negative  Monitor urinary output and consider   Sosa cath if needed    8. Anemia  Suspect secondary to acute blood loss  Hemoglobin 8.0  Continue to trend H&H  Transfuse for hemoglobin less than 7      DVT Prophylaxis: lovenox  Discharge and disposition barriers:   PT/OT eval, K stabilization, IV abx, general surgery clearance,  Left heel debridement ?  tomorrow      Billie Rodriguez sister - [phone] discussed with: Patient/Family, Nurse and        Total time spent with patient: 35 minutes.

## 2022-06-15 LAB
CRP SERPL-MCNC: 3.01 MG/DL (ref 0–0.6)
GLUCOSE BLD STRIP.AUTO-MCNC: 132 MG/DL (ref 65–117)
GLUCOSE BLD STRIP.AUTO-MCNC: 141 MG/DL (ref 65–117)
GLUCOSE BLD STRIP.AUTO-MCNC: 205 MG/DL (ref 65–117)
GLUCOSE BLD STRIP.AUTO-MCNC: 221 MG/DL (ref 65–117)
GLUCOSE BLD STRIP.AUTO-MCNC: 273 MG/DL (ref 65–117)
PERFORMED BY, TECHID: ABNORMAL

## 2022-06-15 PROCEDURE — 74011636637 HC RX REV CODE- 636/637: Performed by: SURGERY

## 2022-06-15 PROCEDURE — 74011250636 HC RX REV CODE- 250/636: Performed by: SURGERY

## 2022-06-15 PROCEDURE — 99024 POSTOP FOLLOW-UP VISIT: CPT | Performed by: SURGERY

## 2022-06-15 PROCEDURE — 65270000029 HC RM PRIVATE

## 2022-06-15 PROCEDURE — 82962 GLUCOSE BLOOD TEST: CPT

## 2022-06-15 PROCEDURE — 86140 C-REACTIVE PROTEIN: CPT

## 2022-06-15 PROCEDURE — 36415 COLL VENOUS BLD VENIPUNCTURE: CPT

## 2022-06-15 RX ADMIN — ENOXAPARIN SODIUM 40 MG: 100 INJECTION SUBCUTANEOUS at 08:42

## 2022-06-15 RX ADMIN — WHITE PETROLATUM,ZINC OXIDE: 20; 75 PASTE TOPICAL at 09:00

## 2022-06-15 RX ADMIN — WHITE PETROLATUM,ZINC OXIDE: 20; 75 PASTE TOPICAL at 21:00

## 2022-06-15 RX ADMIN — COLLAGENASE SANTYL: 250 OINTMENT TOPICAL at 08:42

## 2022-06-15 RX ADMIN — INSULIN LISPRO 4 UNITS: 100 INJECTION, SOLUTION INTRAVENOUS; SUBCUTANEOUS at 17:28

## 2022-06-15 RX ADMIN — INSULIN LISPRO 4 UNITS: 100 INJECTION, SOLUTION INTRAVENOUS; SUBCUTANEOUS at 11:15

## 2022-06-15 NOTE — PROGRESS NOTES
Patient examined this morning. Patient is awake  Vital signs stable  Abdomen soft. Colostomy has stool. Wounds are clean and dry. Left heel wound necrotic tissue noted. I will schedule for left leg angiogram with interventions at the same time left heel wound debridement tomorrow. And I will talk to the family about this procedure.

## 2022-06-15 NOTE — WOUND CARE
WCN in for ostomy evaluation. Ostomy pouch full of soft brown stool, caused ostomy to leak to the medial aspect near sutures. Abdominal dressing, gown and Primo fit soiled. Ostomy pouch removed and site cleansed with NS, wafer cut to fit over stoma. Wafer applied to skin and ostomy pouch applied to wafer. Applied hand over pouch and wafer to ensure good adhesion. Abdominal dressing removed, site care given and new abdominal dressing applied. Primo fit removed, site care given and new primo fit applied and gown changed. Ostomy pouch needs to be emptied when  1/3 to 1/2 full to avoid pouch leaking.

## 2022-06-15 NOTE — PROGRESS NOTES
Hospitalist Progress Note    Subjective:   Daily Progress Note: 6/15/2022 9:06 AM    Patient examined alert and nonverbal lying in bed. No acute distress noted on examination. No overnight events reported. Diet advanced, tolerating well.      Current Facility-Administered Medications   Medication Dose Route Frequency    dextrose 5% - LR with KCl 20 mEq/L infusion   IntraVENous CONTINUOUS    ondansetron (ZOFRAN ODT) tablet 4 mg  4 mg Oral Q8H PRN    Or    ondansetron (ZOFRAN) injection 4 mg  4 mg IntraVENous Q6H PRN    enoxaparin (LOVENOX) injection 40 mg  40 mg SubCUTAneous DAILY    HYDROmorphone (DILAUDID) syringe 0.25 mg  0.25 mg IntraVENous Q3H PRN    Or    HYDROmorphone (DILAUDID) injection 1 mg  1 mg IntraVENous Q4H PRN    zinc oxide-white petrolatum 20-75 % topical paste   Topical BID    collagenase (SANTYL) 250 unit/gram ointment   Topical DAILY    insulin lispro (HUMALOG) injection   SubCUTAneous Q6H    glucose chewable tablet 16 g  4 Tablet Oral PRN    glucagon (GLUCAGEN) injection 1 mg  1 mg IntraMUSCular PRN    dextrose 10% infusion 0-250 mL  0-250 mL IntraVENous PRN    labetaloL (NORMODYNE;TRANDATE) 20 mg/4 mL (5 mg/mL) injection 10 mg  10 mg IntraVENous Q4H PRN    acetaminophen (TYLENOL) tablet 650 mg  650 mg Oral Q6H PRN    Or    acetaminophen (TYLENOL) suppository 650 mg  650 mg Rectal Q6H PRN    polyethylene glycol (MIRALAX) packet 17 g  17 g Oral DAILY PRN    haloperidol lactate (HALDOL) injection 2 mg  2 mg IntraMUSCular Q4H PRN    insulin glargine (LANTUS) injection 14 Units  14 Units SubCUTAneous DAILY        Review of Systems  Review of Systems   Unable to perform ROS: Acuity of condition            Objective:     Visit Vitals  BP (!) 160/76 (BP 1 Location: Left upper arm, BP Patient Position: At rest)   Pulse 80   Temp 97.1 °F (36.2 °C)   Resp 16   Ht 5' 10\" (1.778 m)   Wt 83.9 kg (185 lb)   SpO2 99%   BMI 26.54 kg/m²      O2 Device: None (Room air)    Temp (24hrs), Av.5 °F (36.4 °C), Min:97.1 °F (36.2 °C), Max:97.8 °F (36.6 °C)      06/15 0701 - 06/15 1900  In: 240 [P.O.:240]  Out: 25 [Drains:25]  06/13 1901 - 06/15 0700  In: 073 [I.V.:765]  Out: 1280 [Urine:850; Drains:130]    Recent Results (from the past 24 hour(s))   GLUCOSE, POC    Collection Time: 06/14/22 11:29 AM   Result Value Ref Range    Glucose (POC) 186 (H) 65 - 117 mg/dL    Performed by Elio Aguilar    GLUCOSE, POC    Collection Time: 06/14/22  5:38 PM   Result Value Ref Range    Glucose (POC) 166 (H) 65 - 117 mg/dL    Performed by Escobar Cavanaugh RN (Traveler)    GLUCOSE, POC    Collection Time: 06/14/22 10:26 PM   Result Value Ref Range    Glucose (POC) 181 (H) 65 - 117 mg/dL    Performed by Danielle Mac 53, POC    Collection Time: 06/15/22  5:17 AM   Result Value Ref Range    Glucose (POC) 141 (H) 65 - 117 mg/dL    Performed by REYES SANDRA    GLUCOSE, POC    Collection Time: 06/15/22  8:37 AM   Result Value Ref Range    Glucose (POC) 132 (H) 65 - 117 mg/dL    Performed by JOEY SAUCEDO         XR CHEST SNGL V   Final Result   1. The nasogastric tube is in satisfactory position. 2.  There is mild hypoventilation and minimal atelectasis within the lung bases. The lungs are otherwise clear   3. This examination is negative for new pulmonary pathology or additional   interval changes. XR CHEST PORT   Final Result   NG tube directed toward the distal stomach. No acute cardiopulmonary   finding. ANKLE BRACHIAL INDEX   Final Result      XR CHEST PORT   Final Result   Partially imaged NG tube coursing below the left hemidiaphragm,   terminating in the stomach. CT ABD PELV WO CONT   Final Result   Persistent sigmoid colon distention. Tapered narrowing for the sigmoid colon through pelvis again noting mesenteric   swirling. Findings again concerning for sigmoid volvulus. Pelvic small bowel loops are oral contrast filled and distended.  (oral contrast   has not reached to the level of the colon at the time of imaging). No free intraperitoneal air. No ascites. Report called to 2E. Spoke with nurse Anne Wilder. XR CHEST PORT   Final Result   Negative exam.      XR ABD (KUB)   Final Result   Persistent sigmoid volvulus. XR CHEST PORT   Final Result   Diminished bibasilar atelectasis. XR ABD ACUTE W 1 V CHEST   Final Result   1. Enteric tube insertion. 2. Persistent distention of small and large bowel. 3. Mild bibasilar atelectasis. CT ABD PELV WO CONT   Final Result   Sigmoid volvulus. PHYSICAL EXAM:    Physical Exam  Vitals and nursing note reviewed. HENT:      Head: Normocephalic and atraumatic. Ears:      Comments: deaf  Cardiovascular:      Rate and Rhythm: Normal rate and regular rhythm. Pulmonary:      Effort: No respiratory distress. Breath sounds: No wheezing. Abdominal:      General: Bowel sounds are normal. There is no distension. Palpations: Abdomen is soft. Tenderness: There is no abdominal tenderness. Comments: Vertical incision in epigastric area dressing clean, dry, and intact with CELESTINO drain in place in RLQ  Colostomy present in LLQ with light brown liquid stool within     Genitourinary:     Comments: Sosa not present  Musculoskeletal:      Right lower leg: No edema. Left lower leg: No edema. Skin:     Capillary Refill: Capillary refill takes less than 2 seconds. Comments: Left heel: dressing intact, foul smelling  Sacral wounds present   Neurological:      Mental Status: He is alert.       Comments: Patient appears to be interactive with yes/no questions but is difficult to ascertain cognition    Psychiatric:      Comments: Unable to assess          Data Review    Recent Results (from the past 24 hour(s))   GLUCOSE, POC    Collection Time: 06/14/22 11:29 AM   Result Value Ref Range    Glucose (POC) 186 (H) 65 - 117 mg/dL    Performed by [address] [place] [address] POC    Collection Time: 06/14/22  5:38 PM   Result Value Ref Range    Glucose (POC) 166 (H) 65 - 117 mg/dL    Performed by Kaley Bolivar RN (Traveler)    GLUCOSE, POC    Collection Time: 06/14/22 10:26 PM   Result Value Ref Range    Glucose (POC) 181 (H) 65 - 117 mg/dL    Performed by Alberta Monge    GLUCOSE, POC    Collection Time: 06/15/22  5:17 AM   Result Value Ref Range    Glucose (POC) 141 (H) 65 - 117 mg/dL    Performed by Ana Schwarz    GLUCOSE, POC    Collection Time: 06/15/22  8:37 AM   Result Value Ref Range    Glucose (POC) 132 (H) 65 - 117 mg/dL    Performed by Riana Harmon         Assessment/Plan:     Principal Problem:    Sigmoid volvulus ([address] (6/4/2022)    Active Problems:    Type 2 diabetes mellitus with stage 2 chronic kidney disease, with long-term current use of insulin ([address] (6/10/2022)      Hypokalemia (6/10/2022)      Foot ulcer due to secondary DM ([address] (6/10/2022)      Stage 2 chronic kidney disease (6/10/2022)      Hypertension (6/10/2022)        Hospital Course:    Raffi Flow is a 78-year-old male with a past medical history of schizophrenia, hypertension, DM, and hyperlipidemia who presented with abdominal pain. ED vital signs unremarkable. Initial labs significant for creatinine of 2.02 and lactic of 3. Blood culture pending. CT abdomen/pelvis showed sigmoid volvulus. Patient admitted for further work-up. Patient started on Rocephin. NG tube and rectal tube placed. General surgery and GI consulted. Underwent sigmoidoscopy with decompression on 6/4. KUB showed enteric tube insertion, mild bibasilar atelectasis, persistent distention of small and large bowel. KUB 6/5 showed persistent sigmoid volvulus. CXR negative for acute pulmonary abnormality.  Repeat CT abd/pelvis showed persistent sigmoid colon distention tapered narrowing for the sigmoid colon through pelvis again noted mesenteric swirling pelvic small bowel loops filled and distended with oral contrast. Ceftriaxone discontinued and started on Zosyn for empiric coverage. ELVIRA showed mild PAD of bilateral LE. Exploratory laparotomy with a sigmoid colon resection and colostomy placement performed on 6/8. Urine culture negative. NG to remain in place until colostomy is functioning. SLP and nutrition consulted. SLP recommending easy to chew. PT/OT consulted. 1. Sigmoid volvulus  S/p sigmoidoscopy with decompression on 6/4  Exploratory laparotomy with a sigmoid colon resection and colostomy placement performed on 6/8  Continue IVF infusion  GI/General surgery following    2. Diabetic ulcer of the left heel  Afebrile, no elevated WBC, inflammatory markers pending  S/p IV Zosyn    6/4 blood: negative  ELVIRA showed mild PAD of bilateral LE  Wound culture pending  BCX negative  Wound care following  left leg angiogram with left heel wound debridement tomorrow    3. Hypokalemia  Stable  Continue on IV supplementation prn    4. Deaf  Noncommunicative, currently calm  Haldol as needed    5. Diabetes mellitus, type II  A1c 7.8  Continue on Lantus 14 units and sliding scale insulin as needed    6. CKD II  Creatinine stable  Monitor    7. Urinary retention  UA + hematuria and proteinuria  6/9 urine: negative  Monitor urinary output and consider   Sosa cath if needed    8. Anemia  Suspect secondary to acute blood loss  Hemoglobin 8.0  Continue to trend H&H  Transfuse for hemoglobin less than 7      DVT Prophylaxis: lovenox  Discharge and disposition barriers:  IV abx, general surgery clearance  left leg angiogram with left heel wound debridement tomorrow      Tessa Perry sister - [phone] discussed with: Patient/Family, Nurse and        Total time spent with patient: 35 minutes.

## 2022-06-15 NOTE — PROGRESS NOTES
CM reviewed chart. patient to have planned procedure Friday. Discharge plans is for SNF/LTC. Referrals have been made and sent with several in approval. Updated UAI has been done and waiting for approval. Uploaded UAI and updated clinicals have been faxed via [facility].     DC plan currently is SNF/LTC

## 2022-06-16 LAB
CRP SERPL-MCNC: 2.99 MG/DL (ref 0–0.6)
GLUCOSE BLD STRIP.AUTO-MCNC: 115 MG/DL (ref 65–117)
GLUCOSE BLD STRIP.AUTO-MCNC: 138 MG/DL (ref 65–117)
GLUCOSE BLD STRIP.AUTO-MCNC: 140 MG/DL (ref 65–117)
GLUCOSE BLD STRIP.AUTO-MCNC: 146 MG/DL (ref 65–117)
GLUCOSE BLD STRIP.AUTO-MCNC: 147 MG/DL (ref 65–117)
GLUCOSE BLD STRIP.AUTO-MCNC: 183 MG/DL (ref 65–117)
GLUCOSE BLD STRIP.AUTO-MCNC: 190 MG/DL (ref 65–117)
GLUCOSE BLD STRIP.AUTO-MCNC: 208 MG/DL (ref 65–117)
PERFORMED BY, TECHID: ABNORMAL
PERFORMED BY, TECHID: NORMAL

## 2022-06-16 PROCEDURE — 99024 POSTOP FOLLOW-UP VISIT: CPT | Performed by: SURGERY

## 2022-06-16 PROCEDURE — 74011000250 HC RX REV CODE- 250: Performed by: SURGERY

## 2022-06-16 PROCEDURE — C1887 CATHETER, GUIDING: HCPCS | Performed by: SURGERY

## 2022-06-16 PROCEDURE — 0KBW0ZZ EXCISION OF LEFT FOOT MUSCLE, OPEN APPROACH: ICD-10-PCS | Performed by: SURGERY

## 2022-06-16 PROCEDURE — C1725 CATH, TRANSLUMIN NON-LASER: HCPCS | Performed by: SURGERY

## 2022-06-16 PROCEDURE — 99152 MOD SED SAME PHYS/QHP 5/>YRS: CPT | Performed by: SURGERY

## 2022-06-16 PROCEDURE — 36415 COLL VENOUS BLD VENIPUNCTURE: CPT

## 2022-06-16 PROCEDURE — 77030006078 HC TAPE GLOW N TEL LEMV -B: Performed by: SURGERY

## 2022-06-16 PROCEDURE — C1769 GUIDE WIRE: HCPCS | Performed by: SURGERY

## 2022-06-16 PROCEDURE — 82962 GLUCOSE BLOOD TEST: CPT

## 2022-06-16 PROCEDURE — 75710 ARTERY X-RAYS ARM/LEG: CPT | Performed by: SURGERY

## 2022-06-16 PROCEDURE — 11046 DBRDMT MUSC&/FSCA EA ADDL: CPT | Performed by: SURGERY

## 2022-06-16 PROCEDURE — C1894 INTRO/SHEATH, NON-LASER: HCPCS | Performed by: SURGERY

## 2022-06-16 PROCEDURE — 75716 ARTERY X-RAYS ARMS/LEGS: CPT | Performed by: SURGERY

## 2022-06-16 PROCEDURE — 11043 DBRDMT MUSC&/FSCA 1ST 20/<: CPT | Performed by: SURGERY

## 2022-06-16 PROCEDURE — 75625 CONTRAST EXAM ABDOMINL AORTA: CPT | Performed by: SURGERY

## 2022-06-16 PROCEDURE — 37228 HC PTA TIB/PER UNI INIT: CPT | Performed by: SURGERY

## 2022-06-16 PROCEDURE — 37228 PR REVSC OPN/PRQ TIB/PERO W/ANGIOPLASTY UNI: CPT | Performed by: SURGERY

## 2022-06-16 PROCEDURE — C1760 CLOSURE DEV, VASC: HCPCS | Performed by: SURGERY

## 2022-06-16 PROCEDURE — 76210000006 HC OR PH I REC 0.5 TO 1 HR: Performed by: SURGERY

## 2022-06-16 PROCEDURE — 65270000029 HC RM PRIVATE

## 2022-06-16 PROCEDURE — 86140 C-REACTIVE PROTEIN: CPT

## 2022-06-16 PROCEDURE — 74011636637 HC RX REV CODE- 636/637: Performed by: SURGERY

## 2022-06-16 PROCEDURE — 36245 INS CATH ABD/L-EXT ART 1ST: CPT | Performed by: SURGERY

## 2022-06-16 PROCEDURE — 99153 MOD SED SAME PHYS/QHP EA: CPT | Performed by: SURGERY

## 2022-06-16 PROCEDURE — 76937 US GUIDE VASCULAR ACCESS: CPT | Performed by: SURGERY

## 2022-06-16 PROCEDURE — 77030013516 HC DEV INFL ANGI MRTM -B: Performed by: SURGERY

## 2022-06-16 PROCEDURE — 74011250636 HC RX REV CODE- 250/636: Performed by: SURGERY

## 2022-06-16 RX ORDER — HEPARIN SODIUM 1000 [USP'U]/ML
INJECTION, SOLUTION INTRAVENOUS; SUBCUTANEOUS AS NEEDED
Status: DISCONTINUED | OUTPATIENT
Start: 2022-06-16 | End: 2022-06-16 | Stop reason: HOSPADM

## 2022-06-16 RX ORDER — HEPARIN SODIUM 200 [USP'U]/100ML
INJECTION, SOLUTION INTRAVENOUS
Status: COMPLETED | OUTPATIENT
Start: 2022-06-16 | End: 2022-06-16

## 2022-06-16 RX ORDER — LIDOCAINE HYDROCHLORIDE 10 MG/ML
INJECTION INFILTRATION; PERINEURAL AS NEEDED
Status: DISCONTINUED | OUTPATIENT
Start: 2022-06-16 | End: 2022-06-16 | Stop reason: HOSPADM

## 2022-06-16 RX ORDER — NITROGLYCERIN 5 MG/ML
INJECTION, SOLUTION INTRAVENOUS AS NEEDED
Status: DISCONTINUED | OUTPATIENT
Start: 2022-06-16 | End: 2022-06-16 | Stop reason: HOSPADM

## 2022-06-16 RX ORDER — FENTANYL CITRATE 50 UG/ML
INJECTION, SOLUTION INTRAMUSCULAR; INTRAVENOUS AS NEEDED
Status: DISCONTINUED | OUTPATIENT
Start: 2022-06-16 | End: 2022-06-16 | Stop reason: HOSPADM

## 2022-06-16 RX ORDER — SODIUM CHLORIDE 0.9 % (FLUSH) 0.9 %
5-40 SYRINGE (ML) INJECTION EVERY 8 HOURS
Status: DISCONTINUED | OUTPATIENT
Start: 2022-06-16 | End: 2022-06-16

## 2022-06-16 RX ORDER — MIDAZOLAM HYDROCHLORIDE 1 MG/ML
INJECTION INTRAMUSCULAR; INTRAVENOUS AS NEEDED
Status: DISCONTINUED | OUTPATIENT
Start: 2022-06-16 | End: 2022-06-16 | Stop reason: HOSPADM

## 2022-06-16 RX ORDER — SODIUM CHLORIDE 0.9 % (FLUSH) 0.9 %
5-40 SYRINGE (ML) INJECTION AS NEEDED
Status: DISCONTINUED | OUTPATIENT
Start: 2022-06-16 | End: 2022-06-17

## 2022-06-16 RX ADMIN — COLLAGENASE SANTYL: 250 OINTMENT TOPICAL at 08:57

## 2022-06-16 RX ADMIN — INSULIN LISPRO 2 UNITS: 100 INJECTION, SOLUTION INTRAVENOUS; SUBCUTANEOUS at 06:00

## 2022-06-16 RX ADMIN — INSULIN LISPRO 4 UNITS: 100 INJECTION, SOLUTION INTRAVENOUS; SUBCUTANEOUS at 00:00

## 2022-06-16 RX ADMIN — HYDROMORPHONE HYDROCHLORIDE 1 MG: 1 INJECTION, SOLUTION INTRAMUSCULAR; INTRAVENOUS; SUBCUTANEOUS at 17:03

## 2022-06-16 RX ADMIN — INSULIN GLARGINE 14 UNITS: 100 INJECTION, SOLUTION SUBCUTANEOUS at 08:57

## 2022-06-16 RX ADMIN — WHITE PETROLATUM,ZINC OXIDE: 20; 75 PASTE TOPICAL at 21:00

## 2022-06-16 RX ADMIN — WHITE PETROLATUM,ZINC OXIDE: 20; 75 PASTE TOPICAL at 09:00

## 2022-06-16 NOTE — PROGRESS NOTES
Nutrition Assessment     Type and Reason for Visit: Reassess (f/u)    Nutrition Recommendations/Plan:   1. Re-start Easy to [employer] once medical stable   2. Ritesh x2/day for wound healing   3. Rec MVI   Monitor and Record wts, po/supplement intakes & BMs in I/Os     Nutrition Assessment:  Admitted for abdominal distention/vomiting. Underwent ex lap with partial sigmoid colon resection, colostomy. +NGT for suctioning, pt frequently removes. Previously NPO until colostomy functioning. Upgraded to easy to chew x2day ago, noted good acceptance w/ easy to chew diet w/ >75% po intake per RN. Currently NPO 2/2 surgery Left leg angiogram with the left heel debridement. Rec to restart diet once medically stable and add ONs for wound healing. Labs: glucose (140), protein (5.8), alb (2.0). meds: Humalog. Malnutrition Assessment:  Malnutrition Status: Mild malnutrition     Estimated Daily Nutrient Needs:  Energy (kcal):  2520(30kcal/kg)  Protein (g):  117gpro       Fluid (ml/day):  2520ml    Nutrition Related Findings:  Nutrition Related FindingsNourished per NFPE. No n/v, d/c. no hx of dyspagia.  last bm (5/15)    Current Nutrition Therapies:  DIET NPO    Anthropometric Measures:  Height:  5' 10\" (177.8 cm)  Current Body Wt:  83.9 kg (184 lb 15.5 oz)  BMI: 26.5    Nutrition Diagnosis:   · Increased nutrient needs related to increased demand for energy/nutrients as evidenced by wounds      Nutrition Interventions:   Food and/or Nutrient Delivery: Start oral diet,Start oral nutrition supplement  Nutrition Education/Counseling: Education not indicated  Coordination of Nutrition Care: Continue to monitor while inpatient,Feeding assistance/environmental change  Plan of Care discussed with: RN    Goals:     Goals: PO intake 75% or greater,by next RD assessment,prior to discharge       Nutrition Monitoring and Evaluation:   Behavioral-Environmental Outcomes: None identified  Food/Nutrient Intake Outcomes: Diet advancement/tolerance,Food and nutrient intake,Supplement intake  Physical Signs/Symptoms Outcomes: Skin,Meal time behavior,Weight    Discharge Planning:    Continue oral nutrition supplement    Reese Crews  Contact: [phone]

## 2022-06-16 NOTE — ROUTINE PROCESS
Received verbal confirmation from Dr. Hunter Luque that he discussed risks and benefits of scheduled procedure today with the patient's primary decision maker.

## 2022-06-16 NOTE — PROGRESS NOTES
Hospitalist Progress Note    Subjective:   Daily Progress Note: 6/16/2022 9:06 AM    Patient examined alert and nonverbal lying in bed. No acute distress noted on examination. No overnight events reported. Left leg angiogram with the left heel debridement planned today.     Current Facility-Administered Medications   Medication Dose Route Frequency    dextrose 5% - LR with KCl 20 mEq/L infusion   IntraVENous CONTINUOUS    ondansetron (ZOFRAN ODT) tablet 4 mg  4 mg Oral Q8H PRN    Or    ondansetron (ZOFRAN) injection 4 mg  4 mg IntraVENous Q6H PRN    enoxaparin (LOVENOX) injection 40 mg  40 mg SubCUTAneous DAILY    HYDROmorphone (DILAUDID) syringe 0.25 mg  0.25 mg IntraVENous Q3H PRN    Or    HYDROmorphone (DILAUDID) injection 1 mg  1 mg IntraVENous Q4H PRN    zinc oxide-white petrolatum 20-75 % topical paste   Topical BID    collagenase (SANTYL) 250 unit/gram ointment   Topical DAILY    insulin lispro (HUMALOG) injection   SubCUTAneous Q6H    glucose chewable tablet 16 g  4 Tablet Oral PRN    glucagon (GLUCAGEN) injection 1 mg  1 mg IntraMUSCular PRN    dextrose 10% infusion 0-250 mL  0-250 mL IntraVENous PRN    labetaloL (NORMODYNE;TRANDATE) 20 mg/4 mL (5 mg/mL) injection 10 mg  10 mg IntraVENous Q4H PRN    acetaminophen (TYLENOL) tablet 650 mg  650 mg Oral Q6H PRN    Or    acetaminophen (TYLENOL) suppository 650 mg  650 mg Rectal Q6H PRN    polyethylene glycol (MIRALAX) packet 17 g  17 g Oral DAILY PRN    haloperidol lactate (HALDOL) injection 2 mg  2 mg IntraMUSCular Q4H PRN    insulin glargine (LANTUS) injection 14 Units  14 Units SubCUTAneous DAILY        Review of Systems  Review of Systems   Unable to perform ROS: Acuity of condition            Objective:     Visit Vitals  BP (!) 145/72 (BP 1 Location: Left upper arm)   Pulse 84   Temp 98.2 °F (36.8 °C)   Resp 20   Ht 5' 10\" (1.778 m)   Wt 83.9 kg (185 lb)   SpO2 100%   BMI 26.54 kg/m²      O2 Device: None (Room air)    Temp (24hrs), Av.1 °F (36.7 °C), Min:97.6 °F (36.4 °C), Max:98.9 °F (37.2 °C)      701 - 1900  In: -   Out: 1440 [Urine:1100; Drains:40]  1901 -  0700  In: 2170 [P.O.:1115; I.V.:1055]  Out: 3045 [Urine:3750; Drains:85]    Recent Results (from the past 24 hour(s))   GLUCOSE, POC    Collection Time: 06/15/22  5:16 PM   Result Value Ref Range    Glucose (POC) 205 (H) 65 - 117 mg/dL    Performed by Meagan Bradley, POC    Collection Time: 06/15/22  7:38 PM   Result Value Ref Range    Glucose (POC) 273 (H) 65 - 117 mg/dL    Performed by Shreya Morataya    GLUCOSE, POC    Collection Time: 22 12:45 AM   Result Value Ref Range    Glucose (POC) 208 (H) 65 - 117 mg/dL    Performed by Shreya Morataya    C REACTIVE PROTEIN, QT    Collection Time: 22  5:41 AM   Result Value Ref Range    C-Reactive protein 2.99 (H) 0.00 - 0.60 mg/dL   GLUCOSE, POC    Collection Time: 22  6:00 AM   Result Value Ref Range    Glucose (POC) 183 (H) 65 - 117 mg/dL    Performed by Shreya Morataya    GLUCOSE, POC    Collection Time: 22  8:21 AM   Result Value Ref Range    Glucose (POC) 147 (H) 65 - 117 mg/dL    Performed by Danni Leija         XR CHEST SNGL V   Final Result   1. The nasogastric tube is in satisfactory position. 2.  There is mild hypoventilation and minimal atelectasis within the lung bases. The lungs are otherwise clear   3. This examination is negative for new pulmonary pathology or additional   interval changes. XR CHEST PORT   Final Result   NG tube directed toward the distal stomach. No acute cardiopulmonary   finding. ANKLE BRACHIAL INDEX   Final Result      XR CHEST PORT   Final Result   Partially imaged NG tube coursing below the left hemidiaphragm,   terminating in the stomach. CT ABD PELV WO CONT   Final Result   Persistent sigmoid colon distention. Tapered narrowing for the sigmoid colon through pelvis again noting mesenteric   swirling.  Findings again concerning for sigmoid volvulus. Pelvic small bowel loops are oral contrast filled and distended. (oral contrast   has not reached to the level of the colon at the time of imaging). No free intraperitoneal air. No ascites. Report called to 2E. Spoke with nurse Fay Ulloa. XR CHEST PORT   Final Result   Negative exam.      XR ABD (KUB)   Final Result   Persistent sigmoid volvulus. XR CHEST PORT   Final Result   Diminished bibasilar atelectasis. XR ABD ACUTE W 1 V CHEST   Final Result   1. Enteric tube insertion. 2. Persistent distention of small and large bowel. 3. Mild bibasilar atelectasis. CT ABD PELV WO CONT   Final Result   Sigmoid volvulus. PHYSICAL EXAM:    Physical Exam  Vitals and nursing note reviewed. HENT:      Head: Normocephalic and atraumatic. Ears:      Comments: deaf  Cardiovascular:      Rate and Rhythm: Normal rate and regular rhythm. Pulmonary:      Effort: No respiratory distress. Breath sounds: No wheezing. Abdominal:      General: Bowel sounds are normal. There is no distension. Palpations: Abdomen is soft. Tenderness: There is no abdominal tenderness. Comments: Vertical incision in epigastric area dressing clean, dry, and intact with CELESTINO drain in place in RLQ  Colostomy present in LLQ with liquid stool within     Genitourinary:     Comments: Sosa not present  Musculoskeletal:      Right lower leg: No edema. Left lower leg: No edema. Skin:     Capillary Refill: Capillary refill takes less than 2 seconds. Comments: Left heel: dressing intact, foul smelling  Sacral wounds present   Neurological:      Mental Status: He is alert.       Comments: Patient appears to be interactive with yes/no questions but is difficult to ascertain cognition    Psychiatric:      Comments: Unable to assess          Data Review    Recent Results (from the past 24 hour(s))   GLUCOSE, POC    Collection Time: 06/15/22  5:16 PM   Result Value Ref Range    Glucose (POC) 205 (H) 65 - 117 mg/dL    Performed by Tammy Watson    GLUCOSE, POC    Collection Time: 06/15/22  7:38 PM   Result Value Ref Range    Glucose (POC) 273 (H) 65 - 117 mg/dL    Performed by Clair Mora    GLUCOSE, POC    Collection Time: 06/16/22 12:45 AM   Result Value Ref Range    Glucose (POC) 208 (H) 65 - 117 mg/dL    Performed by Clair Mora    C REACTIVE PROTEIN, QT    Collection Time: 06/16/22  5:41 AM   Result Value Ref Range    C-Reactive protein 2.99 (H) 0.00 - 0.60 mg/dL   GLUCOSE, POC    Collection Time: 06/16/22  6:00 AM   Result Value Ref Range    Glucose (POC) 183 (H) 65 - 117 mg/dL    Performed by Leonora Phillips, POC    Collection Time: 06/16/22  8:21 AM   Result Value Ref Range    Glucose (POC) 147 (H) 65 - 117 mg/dL    Performed by Lisa Ang         Assessment/Plan:     Principal Problem:    Sigmoid volvulus ([address] (6/4/2022)    Active Problems:    Type 2 diabetes mellitus with stage 2 chronic kidney disease, with long-term current use of insulin ([address] (6/10/2022)      Hypokalemia (6/10/2022)      Foot ulcer due to secondary DM ([address] (6/10/2022)      Stage 2 chronic kidney disease (6/10/2022)      Hypertension (6/10/2022)        Hospital Course:    Arlyn Blanchard is a 33-year-old male with a past medical history of schizophrenia, hypertension, DM, and hyperlipidemia who presented with abdominal pain. ED vital signs unremarkable. Initial labs significant for creatinine of 2.02 and lactic of 3. Blood culture pending. CT abdomen/pelvis showed sigmoid volvulus. Patient admitted for further work-up. Patient started on Rocephin. NG tube and rectal tube placed. General surgery and GI consulted. Underwent sigmoidoscopy with decompression on 6/4. KUB showed enteric tube insertion, mild bibasilar atelectasis, persistent distention of small and large bowel. KUB 6/5 showed persistent sigmoid volvulus. CXR negative for acute pulmonary abnormality.  Repeat CT abd/pelvis showed persistent sigmoid colon distention tapered narrowing for the sigmoid colon through pelvis again noted mesenteric swirling pelvic small bowel loops filled and distended with oral contrast. Ceftriaxone discontinued and started on Zosyn for empiric coverage. ELVIRA showed mild PAD of bilateral LE. Exploratory laparotomy with a sigmoid colon resection and colostomy placement performed on 6/8. Urine culture negative. NG to remain in place until colostomy is functioning. SLP and nutrition consulted. SLP recommending easy to chew. PT/OT consulted. Left leg angiogram with left heel wound debridement planned. 1. Sigmoid volvulus  S/p sigmoidoscopy with decompression on 6/4  Exploratory laparotomy with a sigmoid colon resection and colostomy placement performed on 6/8  Continue IVF infusion  GI/General surgery following    2. Diabetic ulcer of the left heel  Afebrile, no elevated WBC, inflammatory markers pending  S/p IV Zosyn    6/4 blood: negative  ELVIRA showed mild PAD of bilateral LE  Wound care following  left leg angiogram with left heel wound debridement planned    3. Hypokalemia  Stable  Continue on IV supplementation prn    4. Deaf  Noncommunicative, currently calm  Haldol as needed    5. Diabetes mellitus, type II  A1c 7.8  Continue on Lantus 14 units and sliding scale insulin as needed    6. CKD II  Creatinine stable  Monitor    7. Urinary retention  UA + hematuria and proteinuria  6/9 urine: negative  Monitor urinary output and consider   Sosa cath if needed    8. Anemia  Suspect secondary to acute blood loss  Hemoglobin 8.0  Continue to trend H&H  Transfuse for hemoglobin less than 7      DVT Prophylaxis: lovenox  Discharge and disposition barriers:  IV abx, general surgery clearance  left leg angiogram with left heel wound debridement   Case management for SNF placement      Amor Waddell sister - [phone] discussed with:  Interddisciplinary team      Total time spent with patient: 35 minutes.

## 2022-06-16 NOTE — PROGRESS NOTES
PROGRESS NOTE      Chief Complaints:  Patient is nonverbal  HPI and  Objective:    Patient doing well colostomy looks okay. Left leg wound examination shows significant gangrene on the heel area. Review of Systems:  Unable to obtain  EXAM:  Visit Vitals  BP (!) 153/77 (BP 1 Location: Left upper arm, BP Patient Position: At rest)   Pulse 92   Temp 98.9 °F (37.2 °C)   Resp 17   Ht 5' 10\" (1.778 m)   Wt 185 lb (83.9 kg)   SpO2 99%   BMI 26.54 kg/m²       Patient is awake   Head and neck atraumatic, normocephalic. ENT: No hoarse voice  Cardiac system regular rate rhythm. Pulmonary no audible wheeze  Chest wall excursion normal with respiration cycle  Abdomen is soft not particularly distended. Neurologically nonfocal.  Skin is warm and moist.  Psychosocial: Cooperative. Vascular examination as previously noted no changes.     Recent Results (from the past 24 hour(s))   GLUCOSE, POC    Collection Time: 06/15/22  8:37 AM   Result Value Ref Range    Glucose (POC) 132 (H) 65 - 117 mg/dL    Performed by JOEY SAUCEDO    C REACTIVE PROTEIN, QT    Collection Time: 06/15/22 10:49 AM   Result Value Ref Range    C-Reactive protein 3.01 (H) 0.00 - 0.60 mg/dL   GLUCOSE, POC    Collection Time: 06/15/22 11:00 AM   Result Value Ref Range    Glucose (POC) 221 (H) 65 - 117 mg/dL    Performed by [address] [place] [address] [facility], POC    Collection Time: 06/15/22  5:16 PM   Result Value Ref Range    Glucose (POC) 205 (H) 65 - 117 mg/dL    Performed by Key Marie, POC    Collection Time: 06/15/22  7:38 PM   Result Value Ref Range    Glucose (POC) 273 (H) 65 - 117 mg/dL    Performed by Latoya Guardado    GLUCOSE, POC    Collection Time: 06/16/22 12:45 AM   Result Value Ref Range    Glucose (POC) 208 (H) 65 - 117 mg/dL    Performed by Latoya Guardado    GLUCOSE, POC    Collection Time: 06/16/22  6:00 AM   Result Value Ref Range    Glucose (POC) 183 (H) 65 - 117 mg/dL    Performed by Latoya Guardado        ASSESSMENT:   Patient is 66 y.o. with diagnosis of : Principal Problem:    Sigmoid volvulus ([address] (6/4/2022)    Active Problems:    Type 2 diabetes mellitus with stage 2 chronic kidney disease, with long-term current use of insulin ([address] (6/10/2022)      Hypokalemia (6/10/2022)      Foot ulcer due to secondary DM ([address] (6/10/2022)      Stage 2 chronic kidney disease (6/10/2022)      Hypertension (6/10/2022)        PLAN:                 Patient scheduled for left leg angiogram with wound debridement today.

## 2022-06-17 LAB
ANION GAP SERPL CALC-SCNC: 7 MMOL/L (ref 5–15)
BASOPHILS # BLD: 0 K/UL (ref 0–0.1)
BASOPHILS NFR BLD: 0 % (ref 0–1)
BUN SERPL-MCNC: 8 MG/DL (ref 6–20)
BUN/CREAT SERPL: 8 (ref 12–20)
CA-I BLD-MCNC: 8.2 MG/DL (ref 8.5–10.1)
CHLORIDE SERPL-SCNC: 102 MMOL/L (ref 97–108)
CO2 SERPL-SCNC: 27 MMOL/L (ref 21–32)
CREAT SERPL-MCNC: 0.99 MG/DL (ref 0.7–1.3)
CRP SERPL-MCNC: 6.63 MG/DL (ref 0–0.6)
DIFFERENTIAL METHOD BLD: ABNORMAL
EOSINOPHIL # BLD: 0.3 K/UL (ref 0–0.4)
EOSINOPHIL NFR BLD: 4 % (ref 0–7)
ERYTHROCYTE [DISTWIDTH] IN BLOOD BY AUTOMATED COUNT: 15.9 % (ref 11.5–14.5)
GLUCOSE BLD STRIP.AUTO-MCNC: 139 MG/DL (ref 65–117)
GLUCOSE BLD STRIP.AUTO-MCNC: 157 MG/DL (ref 65–117)
GLUCOSE BLD STRIP.AUTO-MCNC: 181 MG/DL (ref 65–117)
GLUCOSE BLD STRIP.AUTO-MCNC: 299 MG/DL (ref 65–117)
GLUCOSE SERPL-MCNC: 261 MG/DL (ref 65–100)
HCT VFR BLD AUTO: 26.6 % (ref 36.6–50.3)
HGB BLD-MCNC: 8.3 G/DL (ref 12.1–17)
IMM GRANULOCYTES # BLD AUTO: 0.1 K/UL (ref 0–0.04)
IMM GRANULOCYTES NFR BLD AUTO: 1 % (ref 0–0.5)
LYMPHOCYTES # BLD: 1.2 K/UL (ref 0.8–3.5)
LYMPHOCYTES NFR BLD: 19 % (ref 12–49)
MCH RBC QN AUTO: 27 PG (ref 26–34)
MCHC RBC AUTO-ENTMCNC: 31.2 G/DL (ref 30–36.5)
MCV RBC AUTO: 86.6 FL (ref 80–99)
MONOCYTES # BLD: 0.7 K/UL (ref 0–1)
MONOCYTES NFR BLD: 11 % (ref 5–13)
NEUTS SEG # BLD: 4.1 K/UL (ref 1.8–8)
NEUTS SEG NFR BLD: 65 % (ref 32–75)
NRBC # BLD: 0 K/UL (ref 0–0.01)
NRBC BLD-RTO: 0 PER 100 WBC
PERFORMED BY, TECHID: ABNORMAL
PLATELET # BLD AUTO: 320 K/UL (ref 150–400)
PMV BLD AUTO: 11.5 FL (ref 8.9–12.9)
POTASSIUM SERPL-SCNC: 4.1 MMOL/L (ref 3.5–5.1)
RBC # BLD AUTO: 3.07 M/UL (ref 4.1–5.7)
SODIUM SERPL-SCNC: 136 MMOL/L (ref 136–145)
WBC # BLD AUTO: 6.4 K/UL (ref 4.1–11.1)

## 2022-06-17 PROCEDURE — 74011250637 HC RX REV CODE- 250/637: Performed by: SURGERY

## 2022-06-17 PROCEDURE — 74011250636 HC RX REV CODE- 250/636: Performed by: SURGERY

## 2022-06-17 PROCEDURE — 80048 BASIC METABOLIC PNL TOTAL CA: CPT

## 2022-06-17 PROCEDURE — 36415 COLL VENOUS BLD VENIPUNCTURE: CPT

## 2022-06-17 PROCEDURE — 82962 GLUCOSE BLOOD TEST: CPT

## 2022-06-17 PROCEDURE — 85025 COMPLETE CBC W/AUTO DIFF WBC: CPT

## 2022-06-17 PROCEDURE — 99232 SBSQ HOSP IP/OBS MODERATE 35: CPT | Performed by: SURGERY

## 2022-06-17 PROCEDURE — 65270000029 HC RM PRIVATE

## 2022-06-17 PROCEDURE — 97110 THERAPEUTIC EXERCISES: CPT

## 2022-06-17 PROCEDURE — 86140 C-REACTIVE PROTEIN: CPT

## 2022-06-17 PROCEDURE — 74011636637 HC RX REV CODE- 636/637: Performed by: SURGERY

## 2022-06-17 RX ORDER — CLOPIDOGREL BISULFATE 75 MG/1
75 TABLET ORAL DAILY
Status: DISCONTINUED | OUTPATIENT
Start: 2022-06-17 | End: 2022-06-21 | Stop reason: HOSPADM

## 2022-06-17 RX ADMIN — INSULIN LISPRO 2 UNITS: 100 INJECTION, SOLUTION INTRAVENOUS; SUBCUTANEOUS at 06:35

## 2022-06-17 RX ADMIN — DEXTROSE MONOHYDRATE, SODIUM CHLORIDE, SODIUM LACTATE, POTASSIUM CHLORIDE, CALCIUM CHLORIDE: 5; 600; 310; 179; 20 INJECTION, SOLUTION INTRAVENOUS at 06:36

## 2022-06-17 RX ADMIN — CLOPIDOGREL BISULFATE 75 MG: 75 TABLET ORAL at 10:33

## 2022-06-17 RX ADMIN — WHITE PETROLATUM,ZINC OXIDE: 20; 75 PASTE TOPICAL at 09:00

## 2022-06-17 RX ADMIN — INSULIN LISPRO 6 UNITS: 100 INJECTION, SOLUTION INTRAVENOUS; SUBCUTANEOUS at 13:04

## 2022-06-17 RX ADMIN — WHITE PETROLATUM,ZINC OXIDE: 20; 75 PASTE TOPICAL at 21:00

## 2022-06-17 RX ADMIN — INSULIN LISPRO 2 UNITS: 100 INJECTION, SOLUTION INTRAVENOUS; SUBCUTANEOUS at 18:02

## 2022-06-17 RX ADMIN — ENOXAPARIN SODIUM 40 MG: 100 INJECTION SUBCUTANEOUS at 10:33

## 2022-06-17 RX ADMIN — INSULIN GLARGINE 14 UNITS: 100 INJECTION, SOLUTION SUBCUTANEOUS at 09:00

## 2022-06-17 NOTE — PROGRESS NOTES
PHYSICAL THERAPY TREATMENT  Patient: Halie Fischer (18 y.o. male)  Date: 6/17/2022  Diagnosis: Sigmoid volvulus (HCC) [K56.2] Sigmoid volvulus (HCC)  Procedure(s) (LRB):  ANGIOGRAPHY LOWER EXT LEFT (N/A)  AORTAGRAM ABDOMINAL (N/A)  ULTRASOUND GUIDED VASCULAR ACCESS (N/A)  ANGIOPLASTY PERONEAL ARTERY (N/A) 1 Day Post-Op  Precautions:    Chart, physical therapy assessment, plan of care and goals were reviewed. ASSESSMENT  Patient continues with skilled PT services and is progressing towards goals. Patient supine in bed upon approach with nursing taking vitals and agreed to therapy via head nod. Per chart review patient is deaf and non verbal and is not able to follow written commands but is able to follow along with demonstration and TC. Therapist guided patient through exercises and gve visual cues for patient to perform exercises himself but patient only nodded head. Therapist then attempted to perform PROM due to patient not able to perform exercises himself but when exercises were performed that required knee/hip movement patient pulled away from therapist, made gesture to indicate pain and managed to say \" no\". Due to patient indicating pain and not michale to perform exercises on his own, rest of supine TE held at this time. Patient left supine in bed with call bell within reach and all needs meet.  was alerted to how patient performed. Current Level of Function Impacting Discharge (mobility/balance):general weakness,poor activity tolerance    Other factors to consider for discharge: PLOF, assistance at home, level of deficits. Acute medical state         PLAN :  Patient continues to benefit from skilled intervention to address the above impairments. Continue treatment per established plan of care. to address goals.     Recommendation for discharge: (in order for the patient to meet his/her long term goals)  Dada Bo    This discharge recommendation:  Has been made in collaboration with the attending provider and/or case management    IF patient discharges home will need the following DME: hospital bed       SUBJECTIVE:   Patient non verbal    OBJECTIVE DATA SUMMARY:   Critical Behavior:  Neurologic State: Alert  Orientation Level: Unable to verbalize  Cognition: Unable to assess (comment)    Therapeutic Exercises:   1x10 AP  1x5 heel slides  Pain Rating:  Un rated pain with movement    Activity Tolerance:   Good  Please refer to the flowsheet for vital signs taken during this treatment. After treatment patient left in no apparent distress:   Supine in bed, Call bell within reach, Bed / chair alarm activated, and Side rails x 3    COMMUNICATION/COLLABORATION:   The patients plan of care was discussed with: Registered nurse. Problem: Mobility Impaired (Adult and Pediatric)  Goal: *Acute Goals and Plan of Care (Insert Text)  Description: Patient will move from supine to sit and sit to supine , scoot up and down, and roll side to side in bed with moderate assistance  within 7 day(s). Patient will transfer from bed to chair and chair to bed with maximal assistance using the least restrictive device within 7 day(s). Patient will improve static standing balance to maximal assistance within 1 week(s).        Outcome: Progressing Towards Goal       Sonali Trinh PTA   Time Calculation: 11 mins

## 2022-06-17 NOTE — PROGRESS NOTES
PROGRESS NOTE      Chief Complaints:  Patient is examined this morning. HPI and  Objective:  Patient was comfortable. Patient would not let me remove the right groin dressing. Patient had left leg angiogram followed by left heel wound debridement done yesterday. Review of Systems:  Unable to assess due to mental status  EXAM:  Visit Vitals  BP (!) 145/66 (BP 1 Location: Left arm, BP Patient Position: At rest)   Pulse 87   Temp 97 °F (36.1 °C)   Resp 20   Ht 5' 10\" (1.778 m)   Wt 185 lb (83.9 kg)   SpO2 100%   BMI 26.54 kg/m²       Patient is awake   Head and neck atraumatic, normocephalic. ENT: No hoarse voice  Cardiac system regular rate rhythm. Pulmonary no audible wheeze  Chest wall excursion normal with respiration cycle  Abdomen is soft not particularly distended. Neurologically nonfocal.  Skin is warm and moist.  Psychosocial: Cooperative. Vascular examination as previously noted no changes.     Recent Results (from the past 24 hour(s))   GLUCOSE, POC    Collection Time: 06/16/22  8:21 AM   Result Value Ref Range    Glucose (POC) 147 (H) 65 - 117 mg/dL    Performed by Malaika Granados, POC    Collection Time: 06/16/22 11:21 AM   Result Value Ref Range    Glucose (POC) 140 (H) 65 - 117 mg/dL    Performed by Malaika Granados, POC    Collection Time: 06/16/22  5:21 PM   Result Value Ref Range    Glucose (POC) 115 65 - 117 mg/dL    Performed by JOEY [id number] Luis Harvey, POC    Collection Time: 06/16/22  5:30 PM   Result Value Ref Range    Glucose (POC) 190 (H) 65 - 117 mg/dL    Performed by Terra Faulkner (TVLR)    GLUCOSE, POC    Collection Time: 06/16/22  9:24 PM   Result Value Ref Range    Glucose (POC) 146 (H) 65 - 117 mg/dL    Performed by Wayne Hernandez    GLUCOSE, POC    Collection Time: 06/16/22 11:48 PM   Result Value Ref Range    Glucose (POC) 138 (H) 65 - 117 mg/dL    Performed by Wayne Hernandez    GLUCOSE, POC    Collection Time: 06/17/22  6:19 AM   Result Value Ref Range Glucose (POC) 157 (H) 65 - 117 mg/dL    Performed by Severa Chock        ASSESSMENT:   Patient is 66 y.o. with diagnosis of : Principal Problem:    Sigmoid volvulus ([address] (6/4/2022)    Active Problems:    Type 2 diabetes mellitus with stage 2 chronic kidney disease, with long-term current use of insulin ([address] (6/10/2022)      Hypokalemia (6/10/2022)      Foot ulcer due to secondary DM ([address] (6/10/2022)      Stage 2 chronic kidney disease (6/10/2022)      Hypertension (6/10/2022)        PLAN:                 Patient has three-vessel  below knee level. Peroneal artery partially open. And patient has a robust flow from the anterior tibial artery. Wound is currently clean. We will do wet-to-dry dressing saline gauze with Kerlix roll. Patient will need bunny boot on the left heel. Patient is a stable to discharge. Please have this patient come see me follow-up in 10 days.

## 2022-06-17 NOTE — PROGRESS NOTES
CM reviewed chart. Discharge order noted. Patient pending bed availability at [facility]. [address] [facility] [address] [place] [address]. Call made to [facility] ([phone]), liaison for admissions. No answer left call back number. Will attempt to call again to see if they have any beds available;e to accept patient.

## 2022-06-17 NOTE — PROCEDURES
This is a brief procedure note on Parmer Eugenio Eglin, patient YOB: 1950. Diagnosis: Left heel wound gangrene. Post procedure: Same as above    Procedure: Excisional wound debridement of the left heel gangrene, 5 x 5 cm involving skin subcutaneous tissue fat to the muscle layer. Description of procedure: Patient was appropriate positioned elevating left foot with a support. Left heel was prepped using Betadine solution. Followed by wound was debrided with Metzenbaum scissors and pickups and #15 blade. Excised wound involved skin subcutaneous tissue fat and some of the musculature. Size about 5 x 5 cm. Followed by wounds are covered with wet-to-dry saline gauze packing with Kerlix roll. Patient tolerated procedure well.

## 2022-06-17 NOTE — PROGRESS NOTES
Hospitalist Progress Note    Subjective:   Daily Progress Note: 6/17/2022 9:06 AM    Patient examined alert and nonverbal lying in bed. No acute distress noted on examination. No overnight events reported.  S/p Left leg angiogram with the left heel debridement    Current Facility-Administered Medications   Medication Dose Route Frequency    clopidogreL (PLAVIX) tablet 75 mg  75 mg Oral DAILY    dextrose 5% - LR with KCl 20 mEq/L infusion   IntraVENous CONTINUOUS    ondansetron (ZOFRAN ODT) tablet 4 mg  4 mg Oral Q8H PRN    Or    ondansetron (ZOFRAN) injection 4 mg  4 mg IntraVENous Q6H PRN    enoxaparin (LOVENOX) injection 40 mg  40 mg SubCUTAneous DAILY    HYDROmorphone (DILAUDID) syringe 0.25 mg  0.25 mg IntraVENous Q3H PRN    Or    HYDROmorphone (DILAUDID) injection 1 mg  1 mg IntraVENous Q4H PRN    zinc oxide-white petrolatum 20-75 % topical paste   Topical BID    insulin lispro (HUMALOG) injection   SubCUTAneous Q6H    glucose chewable tablet 16 g  4 Tablet Oral PRN    glucagon (GLUCAGEN) injection 1 mg  1 mg IntraMUSCular PRN    dextrose 10% infusion 0-250 mL  0-250 mL IntraVENous PRN    labetaloL (NORMODYNE;TRANDATE) 20 mg/4 mL (5 mg/mL) injection 10 mg  10 mg IntraVENous Q4H PRN    acetaminophen (TYLENOL) tablet 650 mg  650 mg Oral Q6H PRN    Or    acetaminophen (TYLENOL) suppository 650 mg  650 mg Rectal Q6H PRN    polyethylene glycol (MIRALAX) packet 17 g  17 g Oral DAILY PRN    haloperidol lactate (HALDOL) injection 2 mg  2 mg IntraMUSCular Q4H PRN    insulin glargine (LANTUS) injection 14 Units  14 Units SubCUTAneous DAILY        Review of Systems  Review of Systems   Unable to perform ROS: Acuity of condition            Objective:     Visit Vitals  BP (!) 165/72 (BP 1 Location: Left upper arm, BP Patient Position: At rest)   Pulse 89   Temp 98.6 °F (37 °C)   Resp 18   Ht 5' 10\" (1.778 m)   Wt 83.9 kg (185 lb)   SpO2 100%   BMI 26.54 kg/m²      O2 Device: None (Room air)    Temp (24hrs), Av.2 °F (36.8 °C), Min:97 °F (36.1 °C), Max:99 °F (37.2 °C)      No intake/output data recorded. 06/15 1901 -  0700  In: 1798 [I.V.:1798]  Out: 2313 [Urine:3700; Drains:70]    Recent Results (from the past 24 hour(s))   GLUCOSE, POC    Collection Time: 22 11:21 AM   Result Value Ref Range    Glucose (POC) 140 (H) 65 - 117 mg/dL    Performed by Terrence Pal    GLUCOSE, POC    Collection Time: 22  5:21 PM   Result Value Ref Range    Glucose (POC) 115 65 - 117 mg/dL    Performed by Marcie Sahu [facility] [address], POC    Collection Time: 22  5:30 PM   Result Value Ref Range    Glucose (POC) 190 (H) 65 - 117 mg/dL    Performed by Sandy ManleyTVJOSEE)    GLUCOSE, POC    Collection Time: 22  9:24 PM   Result Value Ref Range    Glucose (POC) 146 (H) 65 - 117 mg/dL    Performed by Carmen Lovell    GLUCOSE, POC    Collection Time: 22 11:48 PM   Result Value Ref Range    Glucose (POC) 138 (H) 65 - 117 mg/dL    Performed by Carmen Lovell    GLUCOSE, POC    Collection Time: 22  6:19 AM   Result Value Ref Range    Glucose (POC) 157 (H) 65 - 117 mg/dL    Performed by Carmen Lovell    GLUCOSE, POC    Collection Time: 22  7:47 AM   Result Value Ref Range    Glucose (POC) 139 (H) 65 - 117 mg/dL    Performed by Khoa LINDSAY         XR CHEST SNGL V   Final Result   1. The nasogastric tube is in satisfactory position. 2.  There is mild hypoventilation and minimal atelectasis within the lung bases. The lungs are otherwise clear   3. This examination is negative for new pulmonary pathology or additional   interval changes. XR CHEST PORT   Final Result   NG tube directed toward the distal stomach. No acute cardiopulmonary   finding. ANKLE BRACHIAL INDEX   Final Result      XR CHEST PORT   Final Result   Partially imaged NG tube coursing below the left hemidiaphragm,   terminating in the stomach.          CT ABD PELV WO CONT   Final Result   Persistent sigmoid colon distention. Tapered narrowing for the sigmoid colon through pelvis again noting mesenteric   swirling. Findings again concerning for sigmoid volvulus. Pelvic small bowel loops are oral contrast filled and distended. (oral contrast   has not reached to the level of the colon at the time of imaging). No free intraperitoneal air. No ascites. Report called to 2E. Spoke with nurse Gelene Najjar. XR CHEST PORT   Final Result   Negative exam.      XR ABD (KUB)   Final Result   Persistent sigmoid volvulus. XR CHEST PORT   Final Result   Diminished bibasilar atelectasis. XR ABD ACUTE W 1 V CHEST   Final Result   1. Enteric tube insertion. 2. Persistent distention of small and large bowel. 3. Mild bibasilar atelectasis. CT ABD PELV WO CONT   Final Result   Sigmoid volvulus. PHYSICAL EXAM:    Physical Exam  Vitals and nursing note reviewed. HENT:      Head: Normocephalic and atraumatic. Ears:      Comments: deaf  Cardiovascular:      Rate and Rhythm: Normal rate and regular rhythm. Pulmonary:      Effort: No respiratory distress. Breath sounds: No wheezing. Abdominal:      General: Bowel sounds are normal. There is no distension. Palpations: Abdomen is soft. Tenderness: There is no abdominal tenderness. Comments: Vertical incision in epigastric area dressing clean, dry, and intact with CELESTINO drain in place in RLQ  Colostomy present in LLQ with liquid stool within     Genitourinary:     Comments: Sosa not present  Musculoskeletal:      Right lower leg: No edema. Left lower leg: No edema. Comments: Left side flaccid   Skin:     Capillary Refill: Capillary refill takes less than 2 seconds. Comments: Left heel: dressing intact, foul smelling  Sacral wounds present   Neurological:      Mental Status: He is alert.       Comments: difficult to ascertain cognition    Psychiatric:      Comments: Unable to assess          Data Review    Recent Results (from the past 24 hour(s))   GLUCOSE, POC    Collection Time: 06/16/22 11:21 AM   Result Value Ref Range    Glucose (POC) 140 (H) 65 - 117 mg/dL    Performed by [id number] [facility], POC    Collection Time: 06/16/22  5:21 PM   Result Value Ref Range    Glucose (POC) 115 65 - 117 mg/dL    Performed by JOEY [id number], POC    Collection Time: 06/16/22  5:30 PM   Result Value Ref Range    Glucose (POC) 190 (H) 65 - 117 mg/dL    Performed by Sandy Hernandez (TVLR)    GLUCOSE, POC    Collection Time: 06/16/22  9:24 PM   Result Value Ref Range    Glucose (POC) 146 (H) 65 - 117 mg/dL    Performed by Carmen Lovell    GLUCOSE, POC    Collection Time: 06/16/22 11:48 PM   Result Value Ref Range    Glucose (POC) 138 (H) 65 - 117 mg/dL    Performed by Carmen Lovell    GLUCOSE, POC    Collection Time: 06/17/22  6:19 AM   Result Value Ref Range    Glucose (POC) 157 (H) 65 - 117 mg/dL    Performed by Enid Prince, POC    Collection Time: 06/17/22  7:47 AM   Result Value Ref Range    Glucose (POC) 139 (H) 65 - 117 mg/dL    Performed by Martínez Nolen         Assessment/Plan:     Principal Problem:    Sigmoid volvulus ([address] (6/4/2022)    Active Problems:    Type 2 diabetes mellitus with stage 2 chronic kidney disease, with long-term current use of insulin ([address] (6/10/2022)      Hypokalemia (6/10/2022)      Foot ulcer due to secondary DM ([address] (6/10/2022)      Stage 2 chronic kidney disease (6/10/2022)      Hypertension (6/10/2022)        Hospital Course:    Valente Mooney is a 68-year-old male with a past medical history of schizophrenia, hypertension, DM, and hyperlipidemia who presented with abdominal pain. ED vital signs unremarkable. Initial labs significant for creatinine of 2.02 and lactic of 3. Blood culture pending. CT abdomen/pelvis showed sigmoid volvulus. Patient admitted for further work-up. Patient started on Rocephin. NG tube and rectal tube placed. General surgery and GI consulted. Underwent sigmoidoscopy with decompression on 6/4. KUB showed enteric tube insertion, mild bibasilar atelectasis, persistent distention of small and large bowel. KUB 6/5 showed persistent sigmoid volvulus. CXR negative for acute pulmonary abnormality. Repeat CT abd/pelvis showed persistent sigmoid colon distention tapered narrowing for the sigmoid colon through pelvis again noted mesenteric swirling pelvic small bowel loops filled and distended with oral contrast. Ceftriaxone discontinued and started on Zosyn for empiric coverage. ELVIRA showed mild PAD of bilateral LE. Exploratory laparotomy with a sigmoid colon resection and colostomy placement performed on 6/8. Urine culture negative. NG to remain in place until colostomy is functioning. SLP and nutrition consulted. SLP recommending easy to chew. PT/OT consulted. Left leg angiogram with left heel wound debridement planned. 1. Sigmoid volvulus  S/p sigmoidoscopy with decompression on 6/4  Exploratory laparotomy with a sigmoid colon resection and colostomy placement performed on 6/8  Continue IVF infusion  GI/General surgery following    2. Diabetic ulcer of the left heel  Afebrile, no elevated WBC, inflammatory markers pending  S/p IV Zosyn    6/4 blood: negative  ELVIRA showed mild PAD of bilateral LE  Wound care following  S/p 6/16 left leg angiogram with left heel wound debridement      3. Hypokalemia  Stable    4. Deaf  Noncommunicative, currently calm  Haldol as needed    5. Diabetes mellitus, type II  A1c 7.8  Continue on Lantus 14 units and sliding scale insulin as needed    6. CKD II  Creatinine stable  Monitor    7. Urinary retention  UA + hematuria and proteinuria  6/9 urine: negative  Monitor urinary output and consider   Sosa cath if needed    8.   Anemia  Suspect secondary to acute blood loss  Hemoglobin 8.0  Continue to trend H&H  Transfuse for hemoglobin less than 7      DVT Prophylaxis: lovenox  Discharge and disposition barriers:  Pending SNF acceptance  Case management for SNF placement      Jenniferluís Woodtyler sister - [phone] discussed with: Interddisciplinary team      Total time spent with patient: 35 minutes.

## 2022-06-18 LAB
CRP SERPL-MCNC: 7.53 MG/DL (ref 0–0.6)
GLUCOSE BLD STRIP.AUTO-MCNC: 153 MG/DL (ref 65–117)
GLUCOSE BLD STRIP.AUTO-MCNC: 210 MG/DL (ref 65–117)
GLUCOSE BLD STRIP.AUTO-MCNC: 226 MG/DL (ref 65–117)
GLUCOSE BLD STRIP.AUTO-MCNC: 230 MG/DL (ref 65–117)
GLUCOSE BLD STRIP.AUTO-MCNC: 230 MG/DL (ref 65–117)
GLUCOSE BLD STRIP.AUTO-MCNC: 244 MG/DL (ref 65–117)
PERFORMED BY, TECHID: ABNORMAL

## 2022-06-18 PROCEDURE — 74011250637 HC RX REV CODE- 250/637: Performed by: SURGERY

## 2022-06-18 PROCEDURE — 74011250636 HC RX REV CODE- 250/636: Performed by: SURGERY

## 2022-06-18 PROCEDURE — 74011636637 HC RX REV CODE- 636/637: Performed by: SURGERY

## 2022-06-18 PROCEDURE — 86140 C-REACTIVE PROTEIN: CPT

## 2022-06-18 PROCEDURE — 36415 COLL VENOUS BLD VENIPUNCTURE: CPT

## 2022-06-18 PROCEDURE — 82962 GLUCOSE BLOOD TEST: CPT

## 2022-06-18 PROCEDURE — 65270000029 HC RM PRIVATE

## 2022-06-18 RX ADMIN — INSULIN LISPRO 2 UNITS: 100 INJECTION, SOLUTION INTRAVENOUS; SUBCUTANEOUS at 05:58

## 2022-06-18 RX ADMIN — LABETALOL HYDROCHLORIDE 10 MG: 5 INJECTION, SOLUTION INTRAVENOUS at 09:53

## 2022-06-18 RX ADMIN — INSULIN LISPRO 4 UNITS: 100 INJECTION, SOLUTION INTRAVENOUS; SUBCUTANEOUS at 23:44

## 2022-06-18 RX ADMIN — INSULIN GLARGINE 14 UNITS: 100 INJECTION, SOLUTION SUBCUTANEOUS at 09:48

## 2022-06-18 RX ADMIN — WHITE PETROLATUM,ZINC OXIDE: 20; 75 PASTE TOPICAL at 09:00

## 2022-06-18 RX ADMIN — INSULIN LISPRO 4 UNITS: 100 INJECTION, SOLUTION INTRAVENOUS; SUBCUTANEOUS at 01:06

## 2022-06-18 RX ADMIN — HALOPERIDOL LACTATE 2 MG: 5 INJECTION, SOLUTION INTRAMUSCULAR at 21:12

## 2022-06-18 RX ADMIN — INSULIN LISPRO 4 UNITS: 100 INJECTION, SOLUTION INTRAVENOUS; SUBCUTANEOUS at 12:53

## 2022-06-18 RX ADMIN — ENOXAPARIN SODIUM 40 MG: 100 INJECTION SUBCUTANEOUS at 09:48

## 2022-06-18 RX ADMIN — CLOPIDOGREL BISULFATE 75 MG: 75 TABLET ORAL at 09:48

## 2022-06-18 RX ADMIN — WHITE PETROLATUM,ZINC OXIDE: 20; 75 PASTE TOPICAL at 21:00

## 2022-06-18 RX ADMIN — INSULIN LISPRO 4 UNITS: 100 INJECTION, SOLUTION INTRAVENOUS; SUBCUTANEOUS at 17:46

## 2022-06-18 NOTE — PROGRESS NOTES
Problem: Non-Violent Restraints  Goal: Removal from restraints as soon as assessed to be safe  Outcome: Progressing Towards Goal  Goal: No harm/injury to patient while restraints in use  Outcome: Progressing Towards Goal  Goal: Patient's dignity will be maintained  Outcome: Progressing Towards Goal  Goal: Patient Interventions  Outcome: Progressing Towards Goal     Problem: Pressure Injury - Risk of  Goal: *Prevention of pressure injury  Description: Document Jet Scale and appropriate interventions in the flowsheet. Outcome: Progressing Towards Goal  Note: Pressure Injury Interventions:  Sensory Interventions: Assess changes in LOC    Moisture Interventions: Absorbent underpads    Activity Interventions: PT/OT evaluation    Mobility Interventions: PT/OT evaluation    Nutrition Interventions: Document food/fluid/supplement intake    Friction and Shear Interventions: Apply protective barrier, creams and emollients,Foam dressings/transparent film/skin sealants,HOB 30 degrees or less                Problem: Falls - Risk of  Goal: *Absence of Falls  Description: Document Fabienne Fall Risk and appropriate interventions in the flowsheet. Outcome: Progressing Towards Goal  Note: Fall Risk Interventions:       Mentation Interventions: Bed/chair exit alarm    Medication Interventions: Bed/chair exit alarm    Elimination Interventions: Bed/chair exit alarm              Problem: Impaired Skin Integrity/Pressure Injury Treatment  Goal: *Improvement of Existing Pressure Injury  Outcome: Progressing Towards Goal  Goal: *Prevention of pressure injury  Description: Document Jet Scale and appropriate interventions in the flowsheet.   Outcome: Progressing Towards Goal  Note: Pressure Injury Interventions:  Sensory Interventions: Assess changes in LOC    Moisture Interventions: Absorbent underpads    Activity Interventions: PT/OT evaluation    Mobility Interventions: PT/OT evaluation    Nutrition Interventions: Document food/fluid/supplement intake    Friction and Shear Interventions: Apply protective barrier, creams and emollients,Foam dressings/transparent film/skin sealants,HOB 30 degrees or less

## 2022-06-18 NOTE — PROGRESS NOTES
15: 46PM Outbound call to [facility], @ [phone]. Inquired if admissions office is open on weekends. \"No.  They will re-open on Monday. \"        PHIL Graves        15:45PM Outbound call to admissions coordinator Hima Figueroa, ([place] [address]) @ [phone]. No answer. PHIL Graves          15:43PM Outbound call to [place] [address], @ [phone]. Informed admissions is closed. Provided the number to admissions coordinator Hima Figueroa @ [phone].         PHIL Graves

## 2022-06-18 NOTE — PROGRESS NOTES
Problem: Pressure Injury - Risk of  Goal: *Prevention of pressure injury  Description: Document Jet Scale and appropriate interventions in the flowsheet. 6/18/2022 0055 by Randi Ward RN  Outcome: Progressing Towards Goal  Note: Pressure Injury Interventions:  Sensory Interventions: Assess changes in LOC    Moisture Interventions: Absorbent underpads    Activity Interventions: PT/OT evaluation    Mobility Interventions: PT/OT evaluation    Nutrition Interventions: Document food/fluid/supplement intake    Friction and Shear Interventions: Apply protective barrier, creams and emollients             6/18/2022 0026 by Randi Ward RN  Outcome: Progressing Towards Goal  Note: Pressure Injury Interventions:  Sensory Interventions: Assess changes in LOC    Moisture Interventions: Absorbent underpads    Activity Interventions: PT/OT evaluation    Mobility Interventions: PT/OT evaluation    Nutrition Interventions: Document food/fluid/supplement intake    Friction and Shear Interventions: Apply protective barrier, creams and emollients                Problem: Patient Education: Go to Patient Education Activity  Goal: Patient/Family Education  Outcome: Progressing Towards Goal     Problem: Falls - Risk of  Goal: *Absence of Falls  Description: Document Fabienne Fall Risk and appropriate interventions in the flowsheet.   Outcome: Progressing Towards Goal  Note: Fall Risk Interventions:       Mentation Interventions: Bed/chair exit alarm    Medication Interventions: Bed/chair exit alarm    Elimination Interventions: Bed/chair exit alarm

## 2022-06-18 NOTE — PROGRESS NOTES
Problem: Pressure Injury - Risk of  Goal: *Prevention of pressure injury  Description: Document Jet Scale and appropriate interventions in the flowsheet.   Outcome: Progressing Towards Goal  Note: Pressure Injury Interventions:  Sensory Interventions: Assess changes in LOC    Moisture Interventions: Absorbent underpads    Activity Interventions: PT/OT evaluation    Mobility Interventions: PT/OT evaluation    Nutrition Interventions: Document food/fluid/supplement intake    Friction and Shear Interventions: Apply protective barrier, creams and emollients                Problem: Patient Education: Go to Patient Education Activity  Goal: Patient/Family Education  Outcome: Progressing Towards Goal     Problem: Patient Education: Go to Patient Education Activity  Goal: Patient/Family Education  Outcome: Progressing Towards Goal

## 2022-06-18 NOTE — PROGRESS NOTES
Hospitalist Progress Note    Subjective:   Daily Progress Note: 6/18/2022 9:06 AM    Patient examined alert and nonverbal lying in bed. No acute distress noted on examination. No overnight events reported. S/p Left leg angiogram with the left heel debridement. Staff reports overnight patient removed colostomy bag multiple times.     Current Facility-Administered Medications   Medication Dose Route Frequency    clopidogreL (PLAVIX) tablet 75 mg  75 mg Oral DAILY    ondansetron (ZOFRAN ODT) tablet 4 mg  4 mg Oral Q8H PRN    Or    ondansetron (ZOFRAN) injection 4 mg  4 mg IntraVENous Q6H PRN    enoxaparin (LOVENOX) injection 40 mg  40 mg SubCUTAneous DAILY    HYDROmorphone (DILAUDID) syringe 0.25 mg  0.25 mg IntraVENous Q3H PRN    Or    HYDROmorphone (DILAUDID) injection 1 mg  1 mg IntraVENous Q4H PRN    zinc oxide-white petrolatum 20-75 % topical paste   Topical BID    insulin lispro (HUMALOG) injection   SubCUTAneous Q6H    glucose chewable tablet 16 g  4 Tablet Oral PRN    glucagon (GLUCAGEN) injection 1 mg  1 mg IntraMUSCular PRN    dextrose 10% infusion 0-250 mL  0-250 mL IntraVENous PRN    labetaloL (NORMODYNE;TRANDATE) 20 mg/4 mL (5 mg/mL) injection 10 mg  10 mg IntraVENous Q4H PRN    acetaminophen (TYLENOL) tablet 650 mg  650 mg Oral Q6H PRN    Or    acetaminophen (TYLENOL) suppository 650 mg  650 mg Rectal Q6H PRN    polyethylene glycol (MIRALAX) packet 17 g  17 g Oral DAILY PRN    haloperidol lactate (HALDOL) injection 2 mg  2 mg IntraMUSCular Q4H PRN    insulin glargine (LANTUS) injection 14 Units  14 Units SubCUTAneous DAILY        Review of Systems  Review of Systems   Unable to perform ROS: Acuity of condition            Objective:     Visit Vitals  BP (!) 166/77 (BP 1 Location: Left upper arm, BP Patient Position: At rest)   Pulse 86   Temp 98 °F (36.7 °C)   Resp 18   Ht 5' 10\" (1.778 m)   Wt 83.9 kg (185 lb)   SpO2 100%   BMI 26.54 kg/m²      O2 Device: None (Room air)    Temp (24hrs), Av.8 °F (36.6 °C), Min:97.4 °F (36.3 °C), Max:98 °F (36.7 °C)      No intake/output data recorded.  1901 -  0700  In: 400 [P.O.:400]  Out: 3050 [Urine:2150; Drains:50]    Recent Results (from the past 24 hour(s))   C REACTIVE PROTEIN, QT    Collection Time: 22 11:14 AM   Result Value Ref Range    C-Reactive protein 6.63 (H) 0.00 - 0.60 mg/dL   CBC WITH AUTOMATED DIFF    Collection Time: 22 11:14 AM   Result Value Ref Range    WBC 6.4 4.1 - 11.1 K/uL    RBC 3.07 (L) 4.10 - 5.70 M/uL    HGB 8.3 (L) 12.1 - 17.0 g/dL    HCT 26.6 (L) 36.6 - 50.3 %    MCV 86.6 80.0 - 99.0 FL    MCH 27.0 26.0 - 34.0 PG    MCHC 31.2 30.0 - 36.5 g/dL    RDW 15.9 (H) 11.5 - 14.5 %    PLATELET 023 491 - 195 K/uL    MPV 11.5 8.9 - 12.9 FL    NRBC 0.0 0.0  WBC    ABSOLUTE NRBC 0.00 0.00 - 0.01 K/uL    NEUTROPHILS 65 32 - 75 %    LYMPHOCYTES 19 12 - 49 %    MONOCYTES 11 5 - 13 %    EOSINOPHILS 4 0 - 7 %    BASOPHILS 0 0 - 1 %    IMMATURE GRANULOCYTES 1 (H) 0 - 0.5 %    ABS. NEUTROPHILS 4.1 1.8 - 8.0 K/UL    ABS. LYMPHOCYTES 1.2 0.8 - 3.5 K/UL    ABS. MONOCYTES 0.7 0.0 - 1.0 K/UL    ABS. EOSINOPHILS 0.3 0.0 - 0.4 K/UL    ABS. BASOPHILS 0.0 0.0 - 0.1 K/UL    ABS. IMM.  GRANS. 0.1 (H) 0.00 - 0.04 K/UL    DF AUTOMATED     METABOLIC PANEL, BASIC    Collection Time: 22 11:14 AM   Result Value Ref Range    Sodium 136 136 - 145 mmol/L    Potassium 4.1 3.5 - 5.1 mmol/L    Chloride 102 97 - 108 mmol/L    CO2 27 21 - 32 mmol/L    Anion gap 7 5 - 15 mmol/L    Glucose 261 (H) 65 - 100 mg/dL    BUN 8 6 - 20 mg/dL    Creatinine 0.99 0.70 - 1.30 mg/dL    BUN/Creatinine ratio 8 (L) 12 - 20      GFR est AA >60 >60 ml/min/1.73m2    GFR est non-AA >60 >60 ml/min/1.73m2    Calcium 8.2 (L) 8.5 - 10.1 mg/dL   GLUCOSE, POC    Collection Time: 22 12:02 PM   Result Value Ref Range    Glucose (POC) 299 (H) 65 - 117 mg/dL    Performed by Lizette Blanca    GLUCOSE, POC    Collection Time: 22  4:07 PM   Result Value Ref Range Glucose (POC) 181 (H) 65 - 117 mg/dL    Performed by Bhakti Koo, POC    Collection Time: 06/18/22 12:37 AM   Result Value Ref Range    Glucose (POC) 210 (H) 65 - 117 mg/dL    Performed by [address] [place] [address], POC    Collection Time: 06/18/22  5:56 AM   Result Value Ref Range    Glucose (POC) 153 (H) 65 - 117 mg/dL    Performed by Andra Villanueva    C REACTIVE PROTEIN, QT    Collection Time: 06/18/22  7:17 AM   Result Value Ref Range    C-Reactive protein 7.53 (H) 0.00 - 0.60 mg/dL        XR CHEST SNGL V   Final Result   1. The nasogastric tube is in satisfactory position. 2.  There is mild hypoventilation and minimal atelectasis within the lung bases. The lungs are otherwise clear   3. This examination is negative for new pulmonary pathology or additional   interval changes. XR CHEST PORT   Final Result   NG tube directed toward the distal stomach. No acute cardiopulmonary   finding. ANKLE BRACHIAL INDEX   Final Result      XR CHEST PORT   Final Result   Partially imaged NG tube coursing below the left hemidiaphragm,   terminating in the stomach. CT ABD PELV WO CONT   Final Result   Persistent sigmoid colon distention. Tapered narrowing for the sigmoid colon through pelvis again noting mesenteric   swirling. Findings again concerning for sigmoid volvulus. Pelvic small bowel loops are oral contrast filled and distended. (oral contrast   has not reached to the level of the colon at the time of imaging). No free intraperitoneal air. No ascites. Report called to 2E. Spoke with nurse Juju Slater. XR CHEST PORT   Final Result   Negative exam.      XR ABD (KUB)   Final Result   Persistent sigmoid volvulus. XR CHEST PORT   Final Result   Diminished bibasilar atelectasis. XR ABD ACUTE W 1 V CHEST   Final Result   1. Enteric tube insertion. 2. Persistent distention of small and large bowel. 3. Mild bibasilar atelectasis.        CT ABD PELV WO CONT   Final Result   Sigmoid volvulus. PHYSICAL EXAM:    Physical Exam  Vitals and nursing note reviewed. HENT:      Head: Normocephalic and atraumatic. Ears:      Comments: deaf  Cardiovascular:      Rate and Rhythm: Normal rate and regular rhythm. Pulmonary:      Effort: No respiratory distress. Breath sounds: No wheezing. Abdominal:      General: Bowel sounds are normal. There is no distension. Palpations: Abdomen is soft. Tenderness: There is no abdominal tenderness. Comments: Vertical incision in epigastric area dressing clean, dry, and intact with CELESTINO drain in place in RLQ  Colostomy present in LLQ with liquid stool within     Genitourinary:     Comments: Sosa not present  Musculoskeletal:      Right lower leg: No edema. Left lower leg: No edema. Comments: Left side flaccid   Skin:     Capillary Refill: Capillary refill takes less than 2 seconds. Comments: Left heel: dressing intact. Staples to abdominal  Sacral wounds present   Neurological:      Mental Status: He is alert.       Comments: difficult to ascertain cognition    Psychiatric:      Comments: Unable to assess          Data Review    Recent Results (from the past 24 hour(s))   C REACTIVE PROTEIN, QT    Collection Time: 06/17/22 11:14 AM   Result Value Ref Range    C-Reactive protein 6.63 (H) 0.00 - 0.60 mg/dL   CBC WITH AUTOMATED DIFF    Collection Time: 06/17/22 11:14 AM   Result Value Ref Range    WBC 6.4 4.1 - 11.1 K/uL    RBC 3.07 (L) 4.10 - 5.70 M/uL    HGB 8.3 (L) 12.1 - 17.0 g/dL    HCT 26.6 (L) 36.6 - 50.3 %    MCV 86.6 80.0 - 99.0 FL    MCH 27.0 26.0 - 34.0 PG    MCHC 31.2 30.0 - 36.5 g/dL    RDW 15.9 (H) 11.5 - 14.5 %    PLATELET 971 690 - 156 K/uL    MPV 11.5 8.9 - 12.9 FL    NRBC 0.0 0.0  WBC    ABSOLUTE NRBC 0.00 0.00 - 0.01 K/uL    NEUTROPHILS 65 32 - 75 %    LYMPHOCYTES 19 12 - 49 %    MONOCYTES 11 5 - 13 %    EOSINOPHILS 4 0 - 7 %    BASOPHILS 0 0 - 1 %    IMMATURE GRANULOCYTES 1 (H) 0 - 0.5 %    ABS. NEUTROPHILS 4.1 1.8 - 8.0 K/UL    ABS. LYMPHOCYTES 1.2 0.8 - 3.5 K/UL    ABS. MONOCYTES 0.7 0.0 - 1.0 K/UL    ABS. EOSINOPHILS 0.3 0.0 - 0.4 K/UL    ABS. BASOPHILS 0.0 0.0 - 0.1 K/UL    ABS. IMM.  GRANS. 0.1 (H) 0.00 - 0.04 K/UL    DF AUTOMATED     METABOLIC PANEL, BASIC    Collection Time: 06/17/22 11:14 AM   Result Value Ref Range    Sodium 136 136 - 145 mmol/L    Potassium 4.1 3.5 - 5.1 mmol/L    Chloride 102 97 - 108 mmol/L    CO2 27 21 - 32 mmol/L    Anion gap 7 5 - 15 mmol/L    Glucose 261 (H) 65 - 100 mg/dL    BUN 8 6 - 20 mg/dL    Creatinine 0.99 0.70 - 1.30 mg/dL    BUN/Creatinine ratio 8 (L) 12 - 20      GFR est AA >60 >60 ml/min/1.73m2    GFR est non-AA >60 >60 ml/min/1.73m2    Calcium 8.2 (L) 8.5 - 10.1 mg/dL   GLUCOSE, POC    Collection Time: 06/17/22 12:02 PM   Result Value Ref Range    Glucose (POC) 299 (H) 65 - 117 mg/dL    Performed by Radha Rivero    GLUCOSE, POC    Collection Time: 06/17/22  4:07 PM   Result Value Ref Range    Glucose (POC) 181 (H) 65 - 117 mg/dL    Performed by Charles Dumacy    GLUCOSE, POC    Collection Time: 06/18/22 12:37 AM   Result Value Ref Range    Glucose (POC) 210 (H) 65 - 117 mg/dL    Performed by NIRMAL MUJICA    GLUCOSE, POC    Collection Time: 06/18/22  5:56 AM   Result Value Ref Range    Glucose (POC) 153 (H) 65 - 117 mg/dL    Performed by Claudia Valencia    C REACTIVE PROTEIN, QT    Collection Time: 06/18/22  7:17 AM   Result Value Ref Range    C-Reactive protein 7.53 (H) 0.00 - 0.60 mg/dL        Assessment/Plan:     Principal Problem:    Sigmoid volvulus (HCC) (6/4/2022)    Active Problems:    Type 2 diabetes mellitus with stage 2 chronic kidney disease, with long-term current use of insulin ([facility] (6/10/2022)      Hypokalemia (6/10/2022)      Foot ulcer due to secondary DM ([facility] (6/10/2022)      Stage 2 chronic kidney disease (6/10/2022)      Hypertension (6/10/2022)        Hospital Course:    Jeronimo Sullivan is a 61-year-old male with a past medical history of schizophrenia, hypertension, DM, and hyperlipidemia who presented with abdominal pain. ED vital signs unremarkable. Initial labs significant for creatinine of 2.02 and lactic of 3. Blood culture pending. CT abdomen/pelvis showed sigmoid volvulus. Patient admitted for further work-up. Patient started on Rocephin. NG tube and rectal tube placed. General surgery and GI consulted. Underwent sigmoidoscopy with decompression on 6/4. KUB showed enteric tube insertion, mild bibasilar atelectasis, persistent distention of small and large bowel. KUB 6/5 showed persistent sigmoid volvulus. CXR negative for acute pulmonary abnormality. Repeat CT abd/pelvis showed persistent sigmoid colon distention tapered narrowing for the sigmoid colon through pelvis again noted mesenteric swirling pelvic small bowel loops filled and distended with oral contrast. Ceftriaxone discontinued and started on Zosyn for empiric coverage. ELVIRA showed mild PAD of bilateral LE. Exploratory laparotomy with a sigmoid colon resection and colostomy placement performed on 6/8. Urine culture negative. NG to remain in place until colostomy is functioning. SLP and nutrition consulted. SLP recommending easy to chew. PT/OT consulted. Left leg angiogram with left heel wound debridement on 6/16. Daily dressing changes wet to dry saline gauze with kerlex        1. Sigmoid volvulus  S/p sigmoidoscopy with decompression on 6/4  Exploratory laparotomy with a sigmoid colon resection and colostomy placement performed on 6/8  Continue IVF infusion  GI/General surgery following    2. Diabetic ulcer of the left heel  Afebrile, no elevated WBC, inflammatory markers pending  S/p IV Zosyn    6/4 blood: negative  ELVIRA showed mild PAD of bilateral LE  Wound care following  S/p 6/16 left leg angiogram with left heel wound debridement    Daily dressing changes wet to dry saline gauze with kerlex    3. Hypokalemia  Stable    4.  Deaf  Noncommunicative, currently calm  Haldol as needed    5. Diabetes mellitus, type II  A1c 7.8  Continue on Lantus 14 units and sliding scale insulin as needed    6. CKD II  Creatinine stable  Monitor    7. Urinary retention  UA + hematuria and proteinuria  6/9 urine: negative  Monitor urinary output and consider   Sosa cath if needed    8. Anemia  Suspect secondary to acute blood loss  Hemoglobin 8.3  Continue to trend H&H  Transfuse for hemoglobin less than 7      DVT Prophylaxis: lovenox  Discharge and disposition barriers:  Pending SNF acceptance  Case management for SNF placement      [facility] - [phone] (updated)      Care Plan discussed with: Interddisciplinary team      Total time spent with patient: 35 minutes.

## 2022-06-19 LAB
CRP SERPL-MCNC: 8.61 MG/DL (ref 0–0.6)
GLUCOSE BLD STRIP.AUTO-MCNC: 187 MG/DL (ref 65–117)
GLUCOSE BLD STRIP.AUTO-MCNC: 196 MG/DL (ref 65–117)
GLUCOSE BLD STRIP.AUTO-MCNC: 202 MG/DL (ref 65–117)
GLUCOSE BLD STRIP.AUTO-MCNC: 208 MG/DL (ref 65–117)
PERFORMED BY, TECHID: ABNORMAL

## 2022-06-19 PROCEDURE — 65270000029 HC RM PRIVATE

## 2022-06-19 PROCEDURE — 74011636637 HC RX REV CODE- 636/637: Performed by: SURGERY

## 2022-06-19 PROCEDURE — 74011250636 HC RX REV CODE- 250/636: Performed by: SURGERY

## 2022-06-19 PROCEDURE — 82962 GLUCOSE BLOOD TEST: CPT

## 2022-06-19 PROCEDURE — 74011250637 HC RX REV CODE- 250/637: Performed by: SURGERY

## 2022-06-19 PROCEDURE — 86140 C-REACTIVE PROTEIN: CPT

## 2022-06-19 PROCEDURE — 36415 COLL VENOUS BLD VENIPUNCTURE: CPT

## 2022-06-19 RX ADMIN — INSULIN LISPRO 4 UNITS: 100 INJECTION, SOLUTION INTRAVENOUS; SUBCUTANEOUS at 13:00

## 2022-06-19 RX ADMIN — ENOXAPARIN SODIUM 40 MG: 100 INJECTION SUBCUTANEOUS at 09:34

## 2022-06-19 RX ADMIN — LABETALOL HYDROCHLORIDE 10 MG: 5 INJECTION, SOLUTION INTRAVENOUS at 19:36

## 2022-06-19 RX ADMIN — WHITE PETROLATUM,ZINC OXIDE: 20; 75 PASTE TOPICAL at 20:00

## 2022-06-19 RX ADMIN — INSULIN LISPRO 2 UNITS: 100 INJECTION, SOLUTION INTRAVENOUS; SUBCUTANEOUS at 17:58

## 2022-06-19 RX ADMIN — WHITE PETROLATUM,ZINC OXIDE: 20; 75 PASTE TOPICAL at 09:00

## 2022-06-19 RX ADMIN — INSULIN LISPRO 2 UNITS: 100 INJECTION, SOLUTION INTRAVENOUS; SUBCUTANEOUS at 06:25

## 2022-06-19 RX ADMIN — INSULIN GLARGINE 14 UNITS: 100 INJECTION, SOLUTION SUBCUTANEOUS at 09:34

## 2022-06-19 RX ADMIN — CLOPIDOGREL BISULFATE 75 MG: 75 TABLET ORAL at 09:34

## 2022-06-19 NOTE — PROGRESS NOTES
Hospitalist Progress Note    Subjective:   Daily Progress Note: 2022 9:06 AM    Patient examined alert and nonverbal lying in bed. No acute distress noted on examination. No overnight events reported. S/p Left leg angiogram with the left heel debridement.  Increased confusion noted, pulling out IVs.    Current Facility-Administered Medications   Medication Dose Route Frequency    clopidogreL (PLAVIX) tablet 75 mg  75 mg Oral DAILY    ondansetron (ZOFRAN ODT) tablet 4 mg  4 mg Oral Q8H PRN    Or    ondansetron (ZOFRAN) injection 4 mg  4 mg IntraVENous Q6H PRN    enoxaparin (LOVENOX) injection 40 mg  40 mg SubCUTAneous DAILY    HYDROmorphone (DILAUDID) syringe 0.25 mg  0.25 mg IntraVENous Q3H PRN    Or    HYDROmorphone (DILAUDID) injection 1 mg  1 mg IntraVENous Q4H PRN    zinc oxide-white petrolatum 20-75 % topical paste   Topical BID    insulin lispro (HUMALOG) injection   SubCUTAneous Q6H    glucose chewable tablet 16 g  4 Tablet Oral PRN    glucagon (GLUCAGEN) injection 1 mg  1 mg IntraMUSCular PRN    dextrose 10% infusion 0-250 mL  0-250 mL IntraVENous PRN    labetaloL (NORMODYNE;TRANDATE) 20 mg/4 mL (5 mg/mL) injection 10 mg  10 mg IntraVENous Q4H PRN    acetaminophen (TYLENOL) tablet 650 mg  650 mg Oral Q6H PRN    Or    acetaminophen (TYLENOL) suppository 650 mg  650 mg Rectal Q6H PRN    polyethylene glycol (MIRALAX) packet 17 g  17 g Oral DAILY PRN    haloperidol lactate (HALDOL) injection 2 mg  2 mg IntraMUSCular Q4H PRN    insulin glargine (LANTUS) injection 14 Units  14 Units SubCUTAneous DAILY        Review of Systems  Review of Systems   Unable to perform ROS: Acuity of condition            Objective:     Visit Vitals  /72 (BP 1 Location: Right upper arm, BP Patient Position: At rest)   Pulse (!) 102   Temp 98.1 °F (36.7 °C)   Resp 16   Ht 5' 10\" (1.778 m)   Wt 83.9 kg (185 lb)   SpO2 100%   BMI 26.54 kg/m²      O2 Device: None (Room air)    Temp (24hrs), Av.1 °F (36.7 °C), Min:98 °F (36.7 °C), Max:98.2 °F (36.8 °C)      06/19 0701 - 06/19 1900  In: -   Out: 3763 [ZWYXP:2969]  06/17 1901 - 06/19 0700  In: 750 [P.O.:750]  Out: 1800 [Urine:1550]    Recent Results (from the past 24 hour(s))   GLUCOSE, POC    Collection Time: 06/18/22 12:05 PM   Result Value Ref Range    Glucose (POC) 230 (H) 65 - 117 mg/dL    Performed by Ofe Velez, POC    Collection Time: 06/18/22  5:42 PM   Result Value Ref Range    Glucose (POC) 226 (H) 65 - 117 mg/dL    Performed by Angelo CULVER/ Abdulkadir Graham [facility], POC    Collection Time: 06/18/22  9:01 PM   Result Value Ref Range    Glucose (POC) 244 (H) 65 - 117 mg/dL    Performed by [facility], POC    Collection Time: 06/18/22 11:41 PM   Result Value Ref Range    Glucose (POC) 230 (H) 65 - 117 mg/dL    Performed by [facility], POC    Collection Time: 06/19/22  6:23 AM   Result Value Ref Range    Glucose (POC) 196 (H) 65 - 117 mg/dL    Performed by Juanito Taylor    C REACTIVE PROTEIN, QT    Collection Time: 06/19/22  7:04 AM   Result Value Ref Range    C-Reactive protein 8.61 (H) 0.00 - 0.60 mg/dL   GLUCOSE, POC    Collection Time: 06/19/22  7:42 AM   Result Value Ref Range    Glucose (POC) 202 (H) 65 - 117 mg/dL    Performed by Isaac Hansen         XR CHEST SNGL V   Final Result   1. The nasogastric tube is in satisfactory position. 2.  There is mild hypoventilation and minimal atelectasis within the lung bases. The lungs are otherwise clear   3. This examination is negative for new pulmonary pathology or additional   interval changes. XR CHEST PORT   Final Result   NG tube directed toward the distal stomach. No acute cardiopulmonary   finding. ANKLE BRACHIAL INDEX   Final Result      XR CHEST PORT   Final Result   Partially imaged NG tube coursing below the left hemidiaphragm,   terminating in the stomach. CT ABD PELV WO CONT   Final Result   Persistent sigmoid colon distention.    Tapered narrowing for the sigmoid colon through pelvis again noting mesenteric   swirling. Findings again concerning for sigmoid volvulus. Pelvic small bowel loops are oral contrast filled and distended. (oral contrast   has not reached to the level of the colon at the time of imaging). No free intraperitoneal air. No ascites. Report called to 2E. Spoke with nurse Rashad Garcia. XR CHEST PORT   Final Result   Negative exam.      XR ABD (KUB)   Final Result   Persistent sigmoid volvulus. XR CHEST PORT   Final Result   Diminished bibasilar atelectasis. XR ABD ACUTE W 1 V CHEST   Final Result   1. Enteric tube insertion. 2. Persistent distention of small and large bowel. 3. Mild bibasilar atelectasis. CT ABD PELV WO CONT   Final Result   Sigmoid volvulus. PHYSICAL EXAM:    Physical Exam  Vitals and nursing note reviewed. HENT:      Head: Normocephalic and atraumatic. Ears:      Comments: deaf  Cardiovascular:      Rate and Rhythm: Regular rhythm. Tachycardia present. Pulmonary:      Effort: No respiratory distress. Breath sounds: No wheezing. Abdominal:      General: Bowel sounds are normal. There is no distension. Palpations: Abdomen is soft. Tenderness: There is no abdominal tenderness. Comments: Vertical incision in epigastric area JAZMINE. Colostomy present in LLQ with liquid stool within     Genitourinary:     Comments: Sosa not present  Musculoskeletal:      Right lower leg: No edema. Left lower leg: No edema. Comments: Left side flaccid   Skin:     Capillary Refill: Capillary refill takes less than 2 seconds. Comments: Left heel: dressing intact. Staples to abdominal  Sacral wounds present   Neurological:      Mental Status: He is alert.       Comments: difficult to ascertain cognition    Psychiatric:      Comments: Unable to assess          Data Review    Recent Results (from the past 24 hour(s))   GLUCOSE, POC    Collection Time: 06/18/22 12:05 PM   Result Value Ref Range    Glucose (POC) 230 (H) 65 - 117 mg/dL    Performed by Bj Garg    GLUCOSE, POC    Collection Time: 06/18/22  5:42 PM   Result Value Ref Range    Glucose (POC) 226 (H) 65 - 117 mg/dL    Performed by Angelo CULVER/ Abdulkadir Graham [facility], POC    Collection Time: 06/18/22  9:01 PM   Result Value Ref Range    Glucose (POC) 244 (H) 65 - 117 mg/dL    Performed by Bennie áSnchez    GLUCOSE, POC    Collection Time: 06/18/22 11:41 PM   Result Value Ref Range    Glucose (POC) 230 (H) 65 - 117 mg/dL    Performed by Bennie Sánchez    GLUCOSE, POC    Collection Time: 06/19/22  6:23 AM   Result Value Ref Range    Glucose (POC) 196 (H) 65 - 117 mg/dL    Performed by Bennie Sánchez    C REACTIVE PROTEIN, QT    Collection Time: 06/19/22  7:04 AM   Result Value Ref Range    C-Reactive protein 8.61 (H) 0.00 - 0.60 mg/dL   GLUCOSE, POC    Collection Time: 06/19/22  7:42 AM   Result Value Ref Range    Glucose (POC) 202 (H) 65 - 117 mg/dL    Performed by Marcela Rivera         Assessment/Plan:     Principal Problem:    Sigmoid volvulus ([address] (6/4/2022)    Active Problems:    Type 2 diabetes mellitus with stage 2 chronic kidney disease, with long-term current use of insulin ([address] (6/10/2022)      Hypokalemia (6/10/2022)      Foot ulcer due to secondary DM ([address] (6/10/2022)      Stage 2 chronic kidney disease (6/10/2022)      Hypertension (6/10/2022)        Hospital Course:    Fred Mcfadden is a 66-year-old male with a past medical history of schizophrenia, hypertension, DM, and hyperlipidemia who presented with abdominal pain. ED vital signs unremarkable. Initial labs significant for creatinine of 2.02 and lactic of 3. Blood culture pending. CT abdomen/pelvis showed sigmoid volvulus. Patient admitted for further work-up. Patient started on Rocephin. NG tube and rectal tube placed. General surgery and GI consulted. Underwent sigmoidoscopy with decompression on 6/4.  KUB showed enteric tube insertion, mild bibasilar atelectasis, persistent distention of small and large bowel. KUB 6/5 showed persistent sigmoid volvulus. CXR negative for acute pulmonary abnormality. Repeat CT abd/pelvis showed persistent sigmoid colon distention tapered narrowing for the sigmoid colon through pelvis again noted mesenteric swirling pelvic small bowel loops filled and distended with oral contrast. Ceftriaxone discontinued and started on Zosyn for empiric coverage. ELVIRA showed mild PAD of bilateral LE. Exploratory laparotomy with a sigmoid colon resection and colostomy placement performed on 6/8. Urine culture negative. NG to remain in place until colostomy is functioning. SLP and nutrition consulted. SLP recommending easy to chew. PT/OT consulted. Left leg angiogram with left heel wound debridement on 6/16. Daily dressing changes wet to dry saline gauze with kerlex. Patient remains medically stable for discharge. 1. Sigmoid volvulus  S/p sigmoidoscopy with decompression on 6/4  Exploratory laparotomy with a sigmoid colon resection and colostomy placement performed on 6/8  Continue IVF infusion  GI/General surgery following    2. Diabetic ulcer of the left heel  Afebrile, no elevated WBC, inflammatory markers pending  S/p IV Zosyn    6/4 blood: negative  ELVIRA showed mild PAD of bilateral LE  Wound care following  S/p 6/16 left leg angiogram with left heel wound debridement    Daily dressing changes wet to dry saline gauze with kerlex    3. Hypokalemia  Stable    4. Deaf  Noncommunicative, currently calm  Haldol as needed    5. Diabetes mellitus, type II  A1c 7.8  Continue on Lantus 14 units and sliding scale insulin as needed    6. CKD II  Creatinine stable  Monitor    7. Urinary retention  UA + hematuria and proteinuria  6/9 urine: negative  Monitor urinary output and consider   Sosa cath if needed    8.   Anemia  Suspect secondary to acute blood loss  Hemoglobin 8.3  Continue to trend H&H  Transfuse for hemoglobin less than 7      DVT Prophylaxis: lovenox  Discharge and disposition barriers:  Pending SNF acceptance  Case management for SNF placement      Rita Back sister - [phone] discussed with: Interdisciplinary team      Total time spent with patient: 35 minutes.

## 2022-06-19 NOTE — PROGRESS NOTES
Problem: Pressure Injury - Risk of  Goal: *Prevention of pressure injury  Description: Document Jet Scale and appropriate interventions in the flowsheet. Outcome: Progressing Towards Goal  Note: Pressure Injury Interventions:  Sensory Interventions: Assess changes in LOC    Moisture Interventions: Absorbent underpads    Activity Interventions: PT/OT evaluation    Mobility Interventions: PT/OT evaluation    Nutrition Interventions: Document food/fluid/supplement intake    Friction and Shear Interventions: Apply protective barrier, creams and emollients                Problem: Falls - Risk of  Goal: *Absence of Falls  Description: Document Fabienne Fall Risk and appropriate interventions in the flowsheet.   Outcome: Progressing Towards Goal  Note: Fall Risk Interventions:       Mentation Interventions: Bed/chair exit alarm    Medication Interventions: Bed/chair exit alarm    Elimination Interventions: Bed/chair exit alarm              Problem: Impaired Skin Integrity/Pressure Injury Treatment  Goal: *Improvement of Existing Pressure Injury  Outcome: Progressing Towards Goal

## 2022-06-19 NOTE — PROGRESS NOTES
Problem: Falls - Risk of  Goal: *Absence of Falls  Description: Document Rik Hanson Fall Risk and appropriate interventions in the flowsheet. Outcome: Progressing Towards Goal  Note: Fall Risk Interventions:       Mentation Interventions: Bed/chair exit alarm,Reorient patient    Medication Interventions: Bed/chair exit alarm    Elimination Interventions: Bed/chair exit alarm              Problem: Impaired Skin Integrity/Pressure Injury Treatment  Goal: *Improvement of Existing Pressure Injury  Outcome: Progressing Towards Goal  Goal: *Prevention of pressure injury  Description: Document Jet Scale and appropriate interventions in the flowsheet.   Outcome: Progressing Towards Goal  Note: Pressure Injury Interventions:  Sensory Interventions: Assess changes in LOC,Float heels,Keep linens dry and wrinkle-free,Minimize linen layers    Moisture Interventions: Absorbent underpads,Internal/External urinary devices    Activity Interventions: PT/OT evaluation,Increase time out of bed    Mobility Interventions: PT/OT evaluation,HOB 30 degrees or less,Float heels    Nutrition Interventions: Document food/fluid/supplement intake    Friction and Shear Interventions: Apply protective barrier, creams and emollients,Foam dressings/transparent film/skin sealants,HOB 30 degrees or less,Minimize layers

## 2022-06-20 LAB
CRP SERPL-MCNC: 7.1 MG/DL (ref 0–0.6)
GLUCOSE BLD STRIP.AUTO-MCNC: 225 MG/DL (ref 65–117)
GLUCOSE BLD STRIP.AUTO-MCNC: 228 MG/DL (ref 65–117)
GLUCOSE BLD STRIP.AUTO-MCNC: 282 MG/DL (ref 65–117)
GLUCOSE BLD STRIP.AUTO-MCNC: 368 MG/DL (ref 65–117)
PERFORMED BY, TECHID: ABNORMAL

## 2022-06-20 PROCEDURE — 82962 GLUCOSE BLOOD TEST: CPT

## 2022-06-20 PROCEDURE — 36415 COLL VENOUS BLD VENIPUNCTURE: CPT

## 2022-06-20 PROCEDURE — 74011250637 HC RX REV CODE- 250/637: Performed by: SURGERY

## 2022-06-20 PROCEDURE — 99024 POSTOP FOLLOW-UP VISIT: CPT | Performed by: SURGERY

## 2022-06-20 PROCEDURE — 74011250636 HC RX REV CODE- 250/636: Performed by: SURGERY

## 2022-06-20 PROCEDURE — 74011636637 HC RX REV CODE- 636/637: Performed by: SURGERY

## 2022-06-20 PROCEDURE — 65270000029 HC RM PRIVATE

## 2022-06-20 PROCEDURE — 86140 C-REACTIVE PROTEIN: CPT

## 2022-06-20 RX ADMIN — INSULIN LISPRO 4 UNITS: 100 INJECTION, SOLUTION INTRAVENOUS; SUBCUTANEOUS at 00:19

## 2022-06-20 RX ADMIN — INSULIN GLARGINE 14 UNITS: 100 INJECTION, SOLUTION SUBCUTANEOUS at 08:32

## 2022-06-20 RX ADMIN — INSULIN LISPRO 6 UNITS: 100 INJECTION, SOLUTION INTRAVENOUS; SUBCUTANEOUS at 17:09

## 2022-06-20 RX ADMIN — WHITE PETROLATUM,ZINC OXIDE: 20; 75 PASTE TOPICAL at 09:00

## 2022-06-20 RX ADMIN — ENOXAPARIN SODIUM 40 MG: 100 INJECTION SUBCUTANEOUS at 08:32

## 2022-06-20 RX ADMIN — CLOPIDOGREL BISULFATE 75 MG: 75 TABLET ORAL at 08:33

## 2022-06-20 RX ADMIN — WHITE PETROLATUM,ZINC OXIDE: 20; 75 PASTE TOPICAL at 20:43

## 2022-06-20 RX ADMIN — INSULIN LISPRO 4 UNITS: 100 INJECTION, SOLUTION INTRAVENOUS; SUBCUTANEOUS at 05:53

## 2022-06-20 RX ADMIN — INSULIN LISPRO 8 UNITS: 100 INJECTION, SOLUTION INTRAVENOUS; SUBCUTANEOUS at 12:15

## 2022-06-20 NOTE — WOUND CARE
WCN in for ostomy evaluation. Patient ostomy pouch in place, no leaking noticed at this time. Patient not appropriate for ostomy education.

## 2022-06-20 NOTE — PROGRESS NOTES
Patient examined this morning. Patient was comfortable. Patient has a restraints in place. Vital signs stable no fever. Abdomen soft colostomy is working. Incision looks clean and dry. I examined the patient's left heel. Wounds are clean and granulating. Continue wet-to-dry dressing on the left heel. Currently waiting for placement.

## 2022-06-20 NOTE — PROGRESS NOTES
Hospitalist Progress Note    Subjective:   Daily Progress Note: 2022 9:06 AM    Resting comfortably    Current Facility-Administered Medications   Medication Dose Route Frequency    clopidogreL (PLAVIX) tablet 75 mg  75 mg Oral DAILY    ondansetron (ZOFRAN ODT) tablet 4 mg  4 mg Oral Q8H PRN    Or    ondansetron (ZOFRAN) injection 4 mg  4 mg IntraVENous Q6H PRN    enoxaparin (LOVENOX) injection 40 mg  40 mg SubCUTAneous DAILY    HYDROmorphone (DILAUDID) syringe 0.25 mg  0.25 mg IntraVENous Q3H PRN    Or    HYDROmorphone (DILAUDID) injection 1 mg  1 mg IntraVENous Q4H PRN    zinc oxide-white petrolatum 20-75 % topical paste   Topical BID    insulin lispro (HUMALOG) injection   SubCUTAneous Q6H    glucose chewable tablet 16 g  4 Tablet Oral PRN    glucagon (GLUCAGEN) injection 1 mg  1 mg IntraMUSCular PRN    dextrose 10% infusion 0-250 mL  0-250 mL IntraVENous PRN    acetaminophen (TYLENOL) tablet 650 mg  650 mg Oral Q6H PRN    Or    acetaminophen (TYLENOL) suppository 650 mg  650 mg Rectal Q6H PRN    polyethylene glycol (MIRALAX) packet 17 g  17 g Oral DAILY PRN    haloperidol lactate (HALDOL) injection 2 mg  2 mg IntraMUSCular Q4H PRN    insulin glargine (LANTUS) injection 14 Units  14 Units SubCUTAneous DAILY        Review of Systems  Review of Systems   Unable to perform ROS: Acuity of condition            Objective:     Visit Vitals  BP (!) 173/86 (BP 1 Location: Right upper arm, BP Patient Position: At rest)   Pulse (!) 119   Temp 98.7 °F (37.1 °C)   Resp 16   Ht 5' 10\" (1.778 m)   Wt 83.9 kg (185 lb)   SpO2 100%   BMI 26.54 kg/m²      O2 Device: None (Room air)    Temp (24hrs), Av.3 °F (36.8 °C), Min:98 °F (36.7 °C), Max:98.7 °F (37.1 °C)       07 -  1900  In: 1200 [P.O.:1200]  Out: 2850 [Urine:2550]  1901 -  0700  In: 600 [P.O.:600]  Out: 3350 [Urine:3050]    Recent Results (from the past 24 hour(s))   GLUCOSE, POC    Collection Time: 22  5:28 PM   Result Value Ref Range    Glucose (POC) 187 (H) 65 - 117 mg/dL    Performed by Angelo CULVER/ Abdulkadir Graham [facility], POC    Collection Time: 06/19/22 11:58 PM   Result Value Ref Range    Glucose (POC) 225 (H) 65 - 117 mg/dL    Performed by Kings De La Torre    GLUCOSE, POC    Collection Time: 06/20/22  5:50 AM   Result Value Ref Range    Glucose (POC) 228 (H) 65 - 117 mg/dL    Performed by Jeanine Denver    C REACTIVE PROTEIN, QT    Collection Time: 06/20/22  8:48 AM   Result Value Ref Range    C-Reactive protein 7.10 (H) 0.00 - 0.60 mg/dL   GLUCOSE, POC    Collection Time: 06/20/22 11:48 AM   Result Value Ref Range    Glucose (POC) 368 (H) 65 - 117 mg/dL    Performed by Jarred Ferrer         XR CHEST SNGL V   Final Result   1. The nasogastric tube is in satisfactory position. 2.  There is mild hypoventilation and minimal atelectasis within the lung bases. The lungs are otherwise clear   3. This examination is negative for new pulmonary pathology or additional   interval changes. XR CHEST PORT   Final Result   NG tube directed toward the distal stomach. No acute cardiopulmonary   finding. ANKLE BRACHIAL INDEX   Final Result      XR CHEST PORT   Final Result   Partially imaged NG tube coursing below the left hemidiaphragm,   terminating in the stomach. CT ABD PELV WO CONT   Final Result   Persistent sigmoid colon distention. Tapered narrowing for the sigmoid colon through pelvis again noting mesenteric   swirling. Findings again concerning for sigmoid volvulus. Pelvic small bowel loops are oral contrast filled and distended. (oral contrast   has not reached to the level of the colon at the time of imaging). No free intraperitoneal air. No ascites. Report called to 2E. Spoke with nurse Juany Trammell. XR CHEST PORT   Final Result   Negative exam.      XR ABD (KUB)   Final Result   Persistent sigmoid volvulus. XR CHEST PORT   Final Result   Diminished bibasilar atelectasis.       XR ABD ACUTE W 1 V CHEST   Final Result   1. Enteric tube insertion. 2. Persistent distention of small and large bowel. 3. Mild bibasilar atelectasis. CT ABD PELV WO CONT   Final Result   Sigmoid volvulus. PHYSICAL EXAM:    Physical Exam  Vitals and nursing note reviewed. HENT:      Head: Normocephalic and atraumatic. Ears:      Comments: deaf  Cardiovascular:      Rate and Rhythm: Regular rhythm. Tachycardia present. Pulmonary:      Effort: No respiratory distress. Breath sounds: No wheezing. Abdominal:      General: Bowel sounds are normal. There is no distension. Palpations: Abdomen is soft. Tenderness: There is no abdominal tenderness. Comments: Vertical incision in epigastric area JAZMINE. Colostomy present in LLQ with liquid stool within     Genitourinary:     Comments: Sosa not present  Musculoskeletal:      Right lower leg: No edema. Left lower leg: No edema. Comments: Left side flaccid   Skin:     Capillary Refill: Capillary refill takes less than 2 seconds. Comments: Left heel: dressing intact. Staples to abdominal  Sacral wounds present   Neurological:      Mental Status: He is alert.       Comments: difficult to ascertain cognition    Psychiatric:      Comments: Unable to assess          Data Review    Recent Results (from the past 24 hour(s))   GLUCOSE, POC    Collection Time: 06/19/22  5:28 PM   Result Value Ref Range    Glucose (POC) 187 (H) 65 - 117 mg/dL    Performed by Angelo CULVER/ [facility]ulkadir [facility], POC    Collection Time: 06/19/22 11:58 PM   Result Value Ref Range    Glucose (POC) 225 (H) 65 - 117 mg/dL    Performed by Zhane Reyna    GLUCOSE, POC    Collection Time: 06/20/22  5:50 AM   Result Value Ref Range    Glucose (POC) 228 (H) 65 - 117 mg/dL    Performed by Kristian Reed    C REACTIVE PROTEIN, QT    Collection Time: 06/20/22  8:48 AM   Result Value Ref Range    C-Reactive protein 7.10 (H) 0.00 - 0.60 mg/dL   GLUCOSE, POC    Collection Time: 06/20/22 11:48 AM   Result Value Ref Range    Glucose (POC) 368 (H) 65 - 117 mg/dL    Performed by Jarred Ferrer         Assessment/Plan:     Principal Problem:    Sigmoid volvulus ([address] (6/4/2022)    Active Problems:    Type 2 diabetes mellitus with stage 2 chronic kidney disease, with long-term current use of insulin ([address] (6/10/2022)      Hypokalemia (6/10/2022)      Foot ulcer due to secondary DM ([address] (6/10/2022)      Stage 2 chronic kidney disease (6/10/2022)      Hypertension (6/10/2022)        Hospital Course:    Heidi Dean is a 73-year-old male with a past medical history of schizophrenia, hypertension, DM, and hyperlipidemia who presented with abdominal pain. ED vital signs unremarkable. Initial labs significant for creatinine of 2.02 and lactic of 3. Blood culture pending. CT abdomen/pelvis showed sigmoid volvulus. Patient admitted for further work-up. Patient started on Rocephin. NG tube and rectal tube placed. General surgery and GI consulted. Underwent sigmoidoscopy with decompression on 6/4. KUB showed enteric tube insertion, mild bibasilar atelectasis, persistent distention of small and large bowel. KUB 6/5 showed persistent sigmoid volvulus. CXR negative for acute pulmonary abnormality. Repeat CT abd/pelvis showed persistent sigmoid colon distention tapered narrowing for the sigmoid colon through pelvis again noted mesenteric swirling pelvic small bowel loops filled and distended with oral contrast. Ceftriaxone discontinued and started on Zosyn for empiric coverage. ELVIRA showed mild PAD of bilateral LE. Exploratory laparotomy with a sigmoid colon resection and colostomy placement performed on 6/8. Urine culture negative. NG to remain in place until colostomy is functioning. SLP and nutrition consulted. SLP recommending easy to chew. PT/OT consulted. Left leg angiogram with left heel wound debridement on 6/16. Daily dressing changes wet to dry saline gauze with kerlex.  Patient remains medically stable for discharge. 1. Sigmoid volvulus  S/p sigmoidoscopy with decompression on 6/4  Exploratory laparotomy with a sigmoid colon resection and colostomy placement performed on 6/8  Continue IVF infusion  GI/General surgery following    2. Diabetic ulcer of the left heel  Afebrile, no elevated WBC, inflammatory markers pending  S/p IV Zosyn    6/4 blood: negative  ELVIRA showed mild PAD of bilateral LE  Wound care following  S/p 6/16 left leg angiogram with left heel wound debridement , S/P angioplasty to TP trunk   Daily dressing changes wet to dry saline gauze with kerlex    3. Hypokalemia  Stable    4. Deaf  Noncommunicative, currently calm  Haldol as needed    5. Diabetes mellitus, type II  A1c 7.8  Continue on Lantus 14 units and sliding scale insulin as needed    6. CKD II  Creatinine stable  Monitor    7. Urinary retention  UA + hematuria and proteinuria  6/9 urine: negative  Monitor urinary output and consider   Sosa cath if needed    8. Anemia  Suspect secondary to acute blood loss  Hemoglobin 8.3  Continue to trend H&H  Transfuse for hemoglobin less than 7      DVT Prophylaxis: lovenox  Discharge and disposition barriers:  Pending SNF acceptance  Case management for SNF placement      Darryl Elmore sister - [phone] discussed with: Interdisciplinary team      Total time spent with patient: 35 minutes.

## 2022-06-20 NOTE — PROGRESS NOTES
Spiritual Care Assessment/Progress Note  [facility]      NAME: Arlyn Blanchard      MRN: [id number]  AGE: 66 y.o. SEX: male  Zoroastrianism Affiliation: No preference   Language: English     6/20/2022     Total Time (in minutes): 10     Spiritual Assessment begun in [facility] through conversation with:         [x]Patient        [] Family    [] Friend(s)        Reason for Consult: Initial visit     Spiritual beliefs: (Please include comment if needed)     [] Identifies with a jeremy tradition:         [] Supported by a jeremy community:            [] Claims no spiritual orientation:           [] Seeking spiritual identity:                [] Adheres to an individual form of spirituality:           [x] Not able to assess:                           Identified resources for coping:      [] Prayer                               [] Music                  [] Guided Imagery     [] Family/friends                 [] Pet visits     [] Devotional reading                         [x] Unknown     [] Other:                                              Interventions offered during this visit: (See comments for more details)    Patient Interventions: Initial visit,Other (comment) (Silent support and prayer)           Plan of Care:     [] Support spiritual and/or cultural needs    [] Support AMD and/or advance care planning process      [] Support grieving process   [] Coordinate Rites and/or Rituals    [] Coordination with community clergy   [] No spiritual needs identified at this time   [] Detailed Plan of Care below (See Comments)  [] Make referral to Music Therapy  [] Make referral to Pet Therapy     [] Make referral to Addiction services  [] Make referral to [facility]  [] Make referral to Spiritual Care Partner  [] No future visits requested        [x] Contact Spiritual Care for further referrals     Comments:  Visit attempted for patient in the [address] [place] [address] for initial assessment.   Patient appeared to be resting and did not respond to greeting. There were no visitors present. Provided silent support and prayer by the door. Contact chaplains for further spiritual care and support. Chaplain Juliette Lin M.Div.    can be reached by calling the  at [facility]  [phone]

## 2022-06-20 NOTE — PROGRESS NOTES
Problem: Non-Violent Restraints  Goal: Removal from restraints as soon as assessed to be safe  Outcome: Resolved/Met  Goal: No harm/injury to patient while restraints in use  Outcome: Resolved/Met  Goal: Patient's dignity will be maintained  Outcome: Resolved/Met  Goal: Patient Interventions  Outcome: Resolved/Met     Problem: Falls - Risk of  Goal: *Absence of Falls  Description: Document Puja Brown Fall Risk and appropriate interventions in the flowsheet.   Outcome: Progressing Towards Goal  Note: Fall Risk Interventions:       Mentation Interventions: Bed/chair exit alarm    Medication Interventions: Bed/chair exit alarm    Elimination Interventions: Bed/chair exit alarm

## 2022-06-21 VITALS
HEIGHT: 70 IN | DIASTOLIC BLOOD PRESSURE: 60 MMHG | OXYGEN SATURATION: 99 % | TEMPERATURE: 97.9 F | HEART RATE: 105 BPM | BODY MASS INDEX: 26.48 KG/M2 | WEIGHT: 185 LBS | SYSTOLIC BLOOD PRESSURE: 131 MMHG | RESPIRATION RATE: 16 BRPM

## 2022-06-21 PROBLEM — L97.409 ULCER OF HEEL DUE TO DIABETES MELLITUS (HCC): Status: ACTIVE | Noted: 2022-06-21

## 2022-06-21 PROBLEM — E11.621 ULCER OF HEEL DUE TO DIABETES MELLITUS (HCC): Status: ACTIVE | Noted: 2022-06-21

## 2022-06-21 LAB
ALBUMIN SERPL-MCNC: 2.3 G/DL (ref 3.5–5)
ALBUMIN/GLOB SERPL: 0.4 {RATIO} (ref 1.1–2.2)
ALP SERPL-CCNC: 89 U/L (ref 45–117)
ALT SERPL-CCNC: 45 U/L (ref 12–78)
ANION GAP SERPL CALC-SCNC: 5 MMOL/L (ref 5–15)
AST SERPL W P-5'-P-CCNC: 27 U/L (ref 15–37)
BASOPHILS # BLD: 0 K/UL (ref 0–0.1)
BASOPHILS NFR BLD: 1 % (ref 0–1)
BILIRUB SERPL-MCNC: 0.2 MG/DL (ref 0.2–1)
BUN SERPL-MCNC: 17 MG/DL (ref 6–20)
BUN/CREAT SERPL: 13 (ref 12–20)
CA-I BLD-MCNC: 8.7 MG/DL (ref 8.5–10.1)
CHLORIDE SERPL-SCNC: 105 MMOL/L (ref 97–108)
CO2 SERPL-SCNC: 26 MMOL/L (ref 21–32)
COVID-19 RAPID TEST, COVR: NOT DETECTED
CREAT SERPL-MCNC: 1.29 MG/DL (ref 0.7–1.3)
CRP SERPL-MCNC: 7.6 MG/DL (ref 0–0.6)
DIFFERENTIAL METHOD BLD: ABNORMAL
EOSINOPHIL # BLD: 0.4 K/UL (ref 0–0.4)
EOSINOPHIL NFR BLD: 5 % (ref 0–7)
ERYTHROCYTE [DISTWIDTH] IN BLOOD BY AUTOMATED COUNT: 15.6 % (ref 11.5–14.5)
GLOBULIN SER CALC-MCNC: 5.2 G/DL (ref 2–4)
GLUCOSE BLD STRIP.AUTO-MCNC: 156 MG/DL (ref 65–117)
GLUCOSE BLD STRIP.AUTO-MCNC: 223 MG/DL (ref 65–117)
GLUCOSE BLD STRIP.AUTO-MCNC: 251 MG/DL (ref 65–117)
GLUCOSE SERPL-MCNC: 184 MG/DL (ref 65–100)
HCT VFR BLD AUTO: 27.5 % (ref 36.6–50.3)
HGB BLD-MCNC: 8.6 G/DL (ref 12.1–17)
IMM GRANULOCYTES # BLD AUTO: 0.1 K/UL (ref 0–0.04)
IMM GRANULOCYTES NFR BLD AUTO: 1 % (ref 0–0.5)
LYMPHOCYTES # BLD: 2.1 K/UL (ref 0.8–3.5)
LYMPHOCYTES NFR BLD: 24 % (ref 12–49)
MCH RBC QN AUTO: 26.5 PG (ref 26–34)
MCHC RBC AUTO-ENTMCNC: 31.3 G/DL (ref 30–36.5)
MCV RBC AUTO: 84.9 FL (ref 80–99)
MONOCYTES # BLD: 1.3 K/UL (ref 0–1)
MONOCYTES NFR BLD: 15 % (ref 5–13)
NEUTS SEG # BLD: 4.7 K/UL (ref 1.8–8)
NEUTS SEG NFR BLD: 54 % (ref 32–75)
NRBC # BLD: 0 K/UL (ref 0–0.01)
NRBC BLD-RTO: 0 PER 100 WBC
PERFORMED BY, TECHID: ABNORMAL
PLATELET # BLD AUTO: 367 K/UL (ref 150–400)
PMV BLD AUTO: 11.1 FL (ref 8.9–12.9)
POTASSIUM SERPL-SCNC: 4.3 MMOL/L (ref 3.5–5.1)
PROT SERPL-MCNC: 7.5 G/DL (ref 6.4–8.2)
RBC # BLD AUTO: 3.24 M/UL (ref 4.1–5.7)
SODIUM SERPL-SCNC: 136 MMOL/L (ref 136–145)
WBC # BLD AUTO: 8.6 K/UL (ref 4.1–11.1)

## 2022-06-21 PROCEDURE — 82962 GLUCOSE BLOOD TEST: CPT

## 2022-06-21 PROCEDURE — 74011250636 HC RX REV CODE- 250/636: Performed by: SURGERY

## 2022-06-21 PROCEDURE — 85025 COMPLETE CBC W/AUTO DIFF WBC: CPT

## 2022-06-21 PROCEDURE — 80053 COMPREHEN METABOLIC PANEL: CPT

## 2022-06-21 PROCEDURE — 87635 SARS-COV-2 COVID-19 AMP PRB: CPT

## 2022-06-21 PROCEDURE — 74011250637 HC RX REV CODE- 250/637: Performed by: SURGERY

## 2022-06-21 PROCEDURE — 36415 COLL VENOUS BLD VENIPUNCTURE: CPT

## 2022-06-21 PROCEDURE — 99024 POSTOP FOLLOW-UP VISIT: CPT | Performed by: SURGERY

## 2022-06-21 PROCEDURE — 86140 C-REACTIVE PROTEIN: CPT

## 2022-06-21 PROCEDURE — 74011636637 HC RX REV CODE- 636/637: Performed by: SURGERY

## 2022-06-21 RX ORDER — CLOPIDOGREL BISULFATE 75 MG/1
75 TABLET ORAL DAILY
Qty: 30 TABLET | Refills: 1 | Status: SHIPPED | OUTPATIENT
Start: 2022-06-22

## 2022-06-21 RX ADMIN — ACETAMINOPHEN 650 MG: 325 TABLET ORAL at 10:18

## 2022-06-21 RX ADMIN — INSULIN GLARGINE 14 UNITS: 100 INJECTION, SOLUTION SUBCUTANEOUS at 10:19

## 2022-06-21 RX ADMIN — ENOXAPARIN SODIUM 40 MG: 100 INJECTION SUBCUTANEOUS at 10:19

## 2022-06-21 RX ADMIN — INSULIN LISPRO 4 UNITS: 100 INJECTION, SOLUTION INTRAVENOUS; SUBCUTANEOUS at 15:56

## 2022-06-21 RX ADMIN — CLOPIDOGREL BISULFATE 75 MG: 75 TABLET ORAL at 10:18

## 2022-06-21 RX ADMIN — WHITE PETROLATUM,ZINC OXIDE: 20; 75 PASTE TOPICAL at 09:00

## 2022-06-21 RX ADMIN — INSULIN LISPRO 6 UNITS: 100 INJECTION, SOLUTION INTRAVENOUS; SUBCUTANEOUS at 02:18

## 2022-06-21 NOTE — PROGRESS NOTES
Pt has been accepted to [facility] at  [address]    Call report: [phone]    Pt will D/C today. Discharge plan of care/case management plan validated with provider discharge order.

## 2022-06-21 NOTE — DISCHARGE INSTRUCTIONS
Patient Education        Aortobifemoral Bypass: What to Expect at Home  Your Recovery  An aortobifemoral bypass is surgery to redirect blood around narrowed or blocked blood vessels in your belly or groin. The surgery is done to increase blood flow to the legs. This may relieve symptoms such as leg pain, numbness, and cramping. You may be able to walk longer distances without leg pain. The doctor used a man-made blood vessel, called a graft, to bypass the narrowed or blocked blood vessels. The graft will carry blood from the aorta to the femoral artery in each thigh. The aorta is the large blood vessel that carries blood from the heart to the blood vessels in the belly. The femoral arteries are large blood vessels that carry blood from the blood vessels in the belly to the legs. You can expect your belly and groin to be sore for several weeks. You will probably feel more tired than usual for several weeks after surgery. You may be able to do many of your usual activities after 4 to 6 weeks. But you will probably need 2 to 3 months to fully recover, especially if you usually do a lot of physical activities. You will have regular tests to check for problems with the graft. This care sheet gives you a general idea about how long it will take for you to recover. But each person recovers at a different pace. Follow the steps below to feel better as quickly as possible. How can you care for yourself at home? Activity    · Rest when you feel tired. Getting enough sleep will help you recover.     · Try to walk each day. Start by walking a little more than you did the day before. Bit by bit, increase the amount you walk. Walking boosts blood flow and helps prevent pneumonia and constipation.     · Avoid strenuous activities, such as bicycle riding, jogging, weight lifting, or aerobic exercise, for 6 weeks or until your doctor says it is okay.     · For 6 weeks, avoid lifting anything that would make you strain.  This may include a child, heavy grocery bags and milk containers, a heavy briefcase or backpack, cat litter or dog food bags, or a vacuum .     · Hold a pillow over your belly incision when you cough or take deep breaths. This will support your belly and decrease your pain.     · Do breathing exercises at home as instructed by your doctor. This will help prevent pneumonia.     · Ask your doctor when you can drive again.     · You will probably need to take at least 4 to 6 weeks off from work. It depends on the type of work you do and how you feel.     · You may shower as usual. Pat the incisions dry. Do not take a bath for the first 2 weeks, or until your doctor tells you it is okay.     · Ask your doctor when it is okay for you to have sex. Diet    · You can eat your normal diet. If your stomach is upset, try bland, low-fat foods like plain rice, broiled chicken, toast, and yogurt.     · Drink plenty of fluids (unless your doctor tells you not to).     · You may notice that your bowel movements are not regular right after your surgery. This is common. Try to avoid constipation and straining with bowel movements. You may want to take a fiber supplement every day. If you have not had a bowel movement after a couple of days, ask your doctor about taking a mild laxative. Medicines    · Your doctor will tell you if and when you can restart your medicines. He or she will also give you instructions about taking any new medicines.     · If you take aspirin or some other blood thinner, ask your doctor if and when to start taking it again. Make sure that you understand exactly what your doctor wants you to do.     · Be safe with medicines. Take your medicines exactly as prescribed. Call your doctor if you think you are having a problem with your medicine.     · Take pain medicines exactly as directed. ? If the doctor gave you a prescription medicine for pain, take it as prescribed.   ? If you are not taking a prescription pain medicine, ask your doctor if you can take an over-the-counter medicine.     · If you think your pain medicine is making you sick to your stomach:  ? Take your medicine after meals (unless your doctor has told you not to). ? Ask your doctor for a different pain medicine.     · If your doctor prescribed antibiotics, take them as directed. Do not stop taking them just because you feel better. You need to take the full course of antibiotics.     · Your doctor may prescribe a blood thinner when you go home. This helps prevent blood clots. Be sure you get instructions about how to take your medicine safely. Blood thinners can cause serious bleeding problems. Incision care    · If you have strips of tape on the incisions, leave the tape on for a week or until it falls off.     · Wash the area daily with warm, soapy water, and pat it dry. Don't use hydrogen peroxide or alcohol, which can slow healing. You may cover the area with a gauze bandage if it weeps or rubs against clothing. Change the bandage every day.     · Keep the area clean and dry. Follow-up care is a key part of your treatment and safety. Be sure to make and go to all appointments, and call your doctor if you are having problems. It's also a good idea to know your test results and keep a list of the medicines you take. When should you call for help? Call 911 anytime you think you may need emergency care. For example, call if:    · You passed out (lost consciousness).     · You have severe trouble breathing.     · You have sudden chest pain and shortness of breath, or you cough up blood.     · You have severe pain in your belly.     · You have chest pain or pressure. This may occur with:  ? Sweating. ? Shortness of breath. ? Nausea or vomiting. ? Pain that spreads from the chest to the neck, jaw, or one or both shoulders or arms. ? Dizziness or lightheadedness. ? A fast or uneven pulse.   After calling 911, chew 1 adult-strength aspirin. Wait for an ambulance. Do not try to drive yourself. Call your doctor now or seek immediate medical care if:    · You have severe pain in your leg, or it becomes cold, pale, blue, tingly, or numb.     · You are sick to your stomach or cannot keep fluids down.     · You have pain that does not get better after you take pain medicine.     · You have a fever over 100°F.     · You have loose stitches, or your incisions come open.     · Bright red blood has soaked through the bandage over any of your incisions.     · You have signs of infection, such as:  ? Increased pain, swelling, warmth, or redness. ? Red streaks leading from the incision. ? Pus draining from the incision. ? Swollen lymph nodes in your neck, armpits, or groin. ? A fever.     · You have signs of a blood clot, such as:  ? Pain in your calf, back of the knee, thigh, or groin. ? Redness and swelling in your leg or groin. Watch closely for any changes in your health, and be sure to contact your doctor if:    · You do not have a bowel movement after taking a laxative. Where can you learn more? Go to http://www.gray.com/  Enter C785 in the search box to learn more about \"Aortobifemoral Bypass: What to Expect at Home. \"  Current as of: January 10, 2022               Content Version: 13.2  © 2006-2022 [employer]. Care instructions adapted under license by [employer] (which disclaims liability or warranty for this information). If you have questions about a medical condition or this instruction, always ask your healthcare professional. [place] [address] any warranty or liability for your use of this information. Patient Education        Learning About Foot and Toenail Care  Foot and toenail care: Overview  Checking your loved one's feet and keeping them clean and soft can help prevent cracks and infection in the skin.  This is especially important for people who have diabetes. Keeping toenails trimmed--and polished if that's what the person likes--also helps the person feel well-groomed. If the person you care for has diabetes or has foot problems, such as bad bunions and corns, think about taking them to see a podiatrist. This is a doctor who specializes in the care of the feet. Sometimes a podiatrist will come to the home if the person can't go out for visits. Try to take the person for salon pedicures if that is what they want. It's a chance to get out and see people and continue a favorite activity. You can do basic nail care at home. Usually all you need to do is keep the nails clean and at a safe length. How do you trim someone's toenails? Try to trim the person's nails every week. Or check the nails each week to see if they need to be trimmed. It's easiest to trim nails after the person has had a shower or foot bath. It makes the nails softer and easier to trim. Start by gathering your supplies. You will need toenail clippers and a nail file. You may also need nail polish and nail polish remover. To trim the nails:  1. Wash and dry your hands. You don't need to wear gloves. 2. Use nail polish remover to take off any polish. 3. Hold the person's foot and toe steady with one hand while you trim the nail with your other hand. Trim the nails straight across. Leave the nails a little longer at the corners so that the sharp ends don't cut into the skin. 4. Keep the nails no longer than the tip of the toes. 5. Let the nails dry if they are still damp and soft. 6. Use a nail file to gently smooth the edges of the nails, especially at the corners. They may be sharp after the nails are cut straight. 7. Apply nail polish, if the person wants it. If the person's nails are thick and discolored, it may be safest to have a podiatrist cut them. What else do you need to know?   When you're caring for someone's nails, it is important to remember not to trim or cut the cuticles. A minor cut in a cuticle could lead to an infection. Wash the feet daily in the shower or bath or in a basin made for washing feet. It's extra important to wash the feet carefully if the person has diabetes. After washing the feet, dry gently. Put lotion on the feet, especially on the heels. But don't put it between the toes. If the person doesn't have diabetes and you see signs of athlete's foot (such as dry, cracking, or itchy skin between the toes), you can try an over-the-counter medicine. These medicines can kill the fungus that causes athlete's foot. If the problem doesn't go away, talk to the person's doctor. Look every day for cuts or signs of infection, such as pain, swelling, redness, or warmth. If you see any of these signs--especially in someone who has diabetes--call the doctor. Where can you learn more? Go to http://www.gray.com/  Enter A726 in the search box to learn more about \"Learning About Foot and Toenail Care. \"  Current as of: November 15, 2021               Content Version: 13.2  © 0048-4380 [employer]. Care instructions adapted under license by [employer] (which disclaims liability or warranty for this information). If you have questions about a medical condition or this instruction, always ask your healthcare professional. [place] [address] any warranty or liability for your use of this information. Patient Education        Open Bowel Resection: Before Your Surgery  What is an open bowel resection? This surgery removes a piece of the intestine (bowel). The doctor makes one large cut in your belly to take out part of the intestine. This cut is called an incision. The doctor then connects the healthy parts of the intestine. This surgery is done to treat a bowel blockage. It can also treat diseases such as Crohn's disease, cancer, diverticulitis, and ulcers.   Most people go home after 3 to 7 days in the hospital.  Follow-up care is a key part of your treatment and safety. Be sure to make and go to all appointments, and call your doctor if you are having problems. It's also a good idea to know your test results and keep a list of the medicines you take. How do you prepare for surgery? Surgery can be stressful. This information will help you understand what you can expect. And it will help you safely prepare for surgery. Preparing for surgery    · Your doctor may have you take antibiotics before surgery.     · A day or two before surgery, your doctor may have you stop eating and have you drink only clear liquids.     · You may take laxatives to clean out your bowels. You also may take an enema. Your doctor will tell you how to do this. Or you may need to go to the hospital the day before surgery to prepare your intestine.     · Be sure you have someone to take you home. Anesthesia and pain medicine will make it unsafe for you to drive or get home on your own.     · Understand exactly what surgery is planned, along with the risks, benefits, and other options.     · If you take aspirin or some other blood thinner, ask your doctor if you should stop taking it before your surgery. Make sure that you understand exactly what your doctor wants you to do. These medicines increase the risk of bleeding.     · Tell your doctor ALL the medicines, vitamins, supplements, and herbal remedies you take. Some may increase the risk of problems during your surgery. Your doctor will tell you if you should stop taking any of them before the surgery and how soon to do it.     · Make sure your doctor and the hospital have a copy of your advance directive. If you don't have one, you may want to prepare one. It lets others know your health care wishes. It's a good thing to have before any type of surgery or procedure. What happens on the day of surgery? · Follow the instructions exactly about when to stop eating and drinking.  If you don't, your surgery may be canceled. If your doctor told you to take your medicines on the day of surgery, take them with only a sip of water.     · Take a bath or shower before you come in for your surgery. Do not apply lotions, perfumes, deodorants, or nail polish.     · Do not shave the surgical site yourself.     · Take off all jewelry and piercings. And take out contact lenses, if you wear them. At the hospital or surgery center   · Bring a picture ID.     · The area for surgery is often marked to make sure there are no errors.     · You will be kept comfortable and safe by your anesthesia provider. You may get medicine that relaxes you or puts you in a light sleep. The area being worked on will be numb.     · The surgery will take 2 to 3 hours. When should you call your doctor? · You have questions or concerns.     · You don't understand how to prepare for your surgery.     · You become ill before the surgery (such as fever, flu, or a cold).     · You need to reschedule or have changed your mind about having the surgery. Where can you learn more? Go to http://www.gray.com/  Enter B466 in the search box to learn more about \"Open Bowel Resection: Before Your Surgery. \"  Current as of: September 8, 2021               Content Version: 13.2  © 2006-2022 [employer]. Care instructions adapted under license by [employer] (which disclaims liability or warranty for this information). If you have questions about a medical condition or this instruction, always ask your healthcare professional. [place] [address] any warranty or liability for your use of this information. Patient Education        Open Bowel Resection: What to Expect at [address] [place] [address] Drive are likely to have pain that comes and goes for the next few days after bowel surgery. You may have bowel cramps, and your cut (incision) may hurt.  You may also feel like you have the flu. You may have a low fever and feel tired and nauseated. This is common. You should feel better after a week and will probably be back to normal in 2 to 3 weeks. This care sheet gives you a general idea about how long it will take for you to recover. But each person recovers at a different pace. Follow the steps below to get better as quickly as possible. How can you care for yourself at home? Activity    · Rest when you feel tired. Getting enough sleep will help you recover.     · Try to walk each day. Start by walking a little more than you did the day before. Bit by bit, increase the amount you walk. Walking boosts blood flow and helps prevent pneumonia and constipation.     · Avoid strenuous activities, such as biking, jogging, weight lifting, or aerobic exercise, until your doctor says it is okay.     · Ask your doctor when you can drive again.     · You will probably need to take 3 to 4 weeks off from work. It depends on the type of work you do and how you feel. You may need to take off 4 to 6 weeks if you lift heavy objects in your job.     · You may shower 24 to 48 hours after surgery, if your doctor says it is okay. Pat the cut (incision) dry. Do not take a bath for the first 2 weeks, or until your doctor tells you it is okay.     · Ask your doctor when it is okay for you to have sex. Diet    · You may not have much appetite after the surgery. But try to eat a healthy diet. Your doctor will tell you about any foods you should not eat.     · Eat a low-fiber diet for several weeks after surgery. Eat many small meals throughout the day. Add high-fiber foods a little at a time.     · Eat yogurt. It puts good bacteria into your colon and helps prevent diarrhea.     · Try to avoid nuts, seeds, and corn for a while. They may be hard to digest.     · You may need to take vitamins that contain sodium and potassium. Ask your doctor.     · Drink plenty of fluids to avoid becoming dehydrated. Medicines    · Your doctor will tell you if and when you can restart your medicines. He or she will also give you instructions about taking any new medicines.     · If you take aspirin or some other blood thinner, ask your doctor if and when to start taking it again. Make sure that you understand exactly what your doctor wants you to do.     · Take pain medicines exactly as directed. ? If the doctor gave you a prescription medicine for pain, take it as prescribed. ? If you are not taking a prescription pain medicine, ask your doctor if you can take an over-the-counter medicine. ? Do not take two or more pain medicines at the same time unless the doctor told you to. Many pain medicines have acetaminophen, which is Tylenol. Too much acetaminophen (Tylenol) can be harmful.     · If you think your pain medicine is making you sick to your stomach:  ? Take your medicine after meals (unless your doctor tells you not to). ? Ask your doctor for a different pain medicine.     · If your doctor prescribed antibiotics, take them as directed. Do not stop taking them just because you feel better. You need to take the full course of antibiotics.     · You may need to take some medicines in a different form. You will be told whether to crush pills or take a liquid form of the medicine.     · If your doctor gives you a stool softener, take it as directed. Incision care    · If you have strips of tape on the incision, leave the tape on for a week or until it falls off.     · Wash the area daily with warm, soapy water, and pat it dry. Follow-up care is a key part of your treatment and safety. Be sure to make and go to all appointments, and call your doctor if you are having problems. It's also a good idea to know your test results and keep a list of the medicines you take. When should you call for help? Call 911 anytime you think you may need emergency care. For example, call if:    · You passed out (lost consciousness).   · You are short of breath. Call your doctor now or seek immediate medical care if:    · You are sick to your stomach and cannot drink fluids or keep them down.     · You have signs of a blood clot in your leg (called a deep vein thrombosis), such as:  ? Pain in your calf, back of the knee, thigh, or groin. ? Redness and swelling in your leg or groin.     · You have signs of infection, such as:  ? Increased pain, swelling, warmth, or redness. ? Red streaks leading from the incision. ? Pus draining from the incision. ? A fever.     · You have pain that does not get better after you take pain medicine.     · You have loose stitches, or your incision comes open.     · Bright red blood has soaked through the bandage.     · You cannot pass stools or gas. Watch closely for any changes in your health, and be sure to contact your doctor if you have any problems. Where can you learn more? Go to http://www.gray.com/  Enter Z281 in the search box to learn more about \"Open Bowel Resection: What to Expect at Home. \"  Current as of: September 8, 2021               Content Version: 13.2  © 3680-8237 [employer]. Care instructions adapted under license by [employer] (which disclaims liability or warranty for this information). If you have questions about a medical condition or this instruction, always ask your healthcare professional. [place] [address] any warranty or liability for your use of this information. Patient Education        Bowel Blockage (Intestinal Obstruction): Care Instructions  Your Care Instructions  A bowel blockage, also called an intestinal obstruction, can prevent gas, fluids, or solids from moving through the intestines normally. It can cause constipation and, rarely, diarrhea. You may have pain, nausea, vomiting, and cramping. Most of the time, complete blockages require a stay in the hospital and possibly surgery.  But if your bowel is only partly blocked, your doctor may tell you to wait until it clears on its own and you are able to pass gas and stool. If so, there are things you can do at home to help make you feel better. If you have had surgery for a bowel blockage, there are things you can do at home to make sure you heal well. You can also make some changes to keep your bowel from becoming blocked again. Follow-up care is a key part of your treatment and safety. Be sure to make and go to all appointments, and call your doctor if you are having problems. It's also a good idea to know your test results and keep a list of the medicines you take. How can you care for yourself at home? If your doctor has told you to wait at home for a blockage to clear on its own:  · Follow your doctor's instructions. These may include eating a liquid diet to avoid complete blockage. · Be safe with medicines. Take your medicines exactly as prescribed. Call your doctor if you think you are having a problem with your medicine. · Put a heating pad set on low on your belly to relieve mild cramps and pain. To prevent another blockage  · Try to eat smaller amounts of food more often. For example, have 5 or 6 small meals throughout the day instead of 2 or 3 large meals. · Chew your food very well. Try to chew each bite about 20 times or until it is liquid. · Avoid high-fiber foods and raw fruits and vegetables with skins, husks, strings, or seeds. These can form a ball of undigested material that can cause a blockage if a part of your bowel is scarred or narrowed. · Check with your doctor before you eat whole-grain products or use a fiber supplement such as Citrucel or Metamucil. · To help you have regular bowel movements, eat at regular times, do not strain during a bowel movement, and drink plenty of water.  If you have kidney, heart, or liver disease and have to limit fluids, talk with your doctor before you increase the amount of fluids you drink. · Drink high-calorie liquid formulas if your doctor says to. Severe symptoms may make it hard for your body to take in vitamins and minerals. · Get regular exercise. It helps you digest your food better. Get at least 30 minutes of physical activity on most days of the week. Walking is a good choice. When should you call for help? Call your doctor now or seek immediate medical care if:    · You have a fever.     · You are vomiting.     · You have new or worse belly pain.     · You cannot pass stools or gas. Watch closely for changes in your health, and be sure to contact your doctor if you have any problems. Where can you learn more? Go to http://www.gray.com/  Enter B082 in the search box to learn more about \"Bowel Blockage (Intestinal Obstruction): Care Instructions. \"  Current as of: September 8, 2021               Content Version: 13.2  © 2006-2022 [employer]. Care instructions adapted under license by [employer] (which disclaims liability or warranty for this information). If you have questions about a medical condition or this instruction, always ask your healthcare professional. [place] [id number] any warranty or liability for your use of this information. Patient Education        Diabetes Foot Health: Care Instructions  Your Care Instructions     When you have diabetes, your feet need extra care and attention. Diabetes can damage the nerve endings and blood vessels in your feet, making you less likely to notice when your feet are injured. Diabetes also limits your body's ability to fight infection and get blood to areas that need it. If you get a minor foot injury, it could become an ulcer or a serious infection. With good foot care, you can prevent most of these problems. Caring for your feet can be quick and easy. Most of the care can be done when you are bathing or getting ready for bed.   Follow-up care is a key part of your treatment and safety. Be sure to make and go to all appointments, and call your doctor if you are having problems. It's also a good idea to know your test results and keep a list of the medicines you take. How can you care for yourself at home? · Keep your blood sugar close to normal by watching what and how much you eat, monitoring blood sugar, taking medicines if prescribed, and getting regular exercise. · Do not smoke. Smoking affects blood flow and can make foot problems worse. If you need help quitting, talk to your doctor about stop-smoking programs and medicines. These can increase your chances of quitting for good. · Eat a diet that is low in fats. High fat intake can cause fat to build up in your blood vessels and decrease blood flow. · Inspect your feet daily for blisters, cuts, cracks, or sores. If you cannot see well, use a mirror or have someone help you. · Take care of your feet:  ? Wash your feet every day. Use warm (not hot) water. Check the water temperature with your wrists or other part of your body, not your feet. ? Dry your feet well. Pat them dry. Do not rub the skin on your feet too hard. Dry well between your toes. If the skin on your feet stays moist, bacteria or a fungus can grow, which can lead to infection. ? Keep your skin soft. Use moisturizing skin cream to keep the skin on your feet soft and prevent calluses and cracks. But do not put the cream between your toes, and stop using any cream that causes a rash. ? Clean underneath your toenails carefully. Do not use a sharp object to clean underneath your toenails. Use the blunt end of a nail file or other rounded tool. ? Trim and file your toenails straight across to prevent ingrown toenails. Use a nail clipper, not scissors. Use an emery board to smooth the edges. · Change socks daily. Socks without seams are best, because seams often rub the feet.  You can find socks for people with diabetes from specialty catalogs. · Look inside your shoes every day for things like gravel or torn linings, which could cause blisters or sores. · Buy shoes that fit well:  ? Look for shoes that have plenty of space around the toes. This helps prevent bunions and blisters. ? Try on shoes while wearing the kind of socks you will usually wear with the shoes. ? Avoid plastic shoes. They may rub your feet and cause blisters. Good shoes should be made of materials that are flexible and breathable, such as leather or cloth. ? Break in new shoes slowly by wearing them for no more than an hour a day for several days. Take extra time to check your feet for red areas, blisters, or other problems after you wear new shoes. · Do not go barefoot. Do not wear sandals, and do not wear shoes with very thin soles. Thin soles are easy to puncture. They also do not protect your feet from hot pavement or cold weather. · Have your doctor check your feet during each visit. If you have a foot problem, see your doctor. Do not try to treat an early foot problem at home. Home remedies or treatments that you can buy without a prescription (such as corn removers) can be harmful. · Always get early treatment for foot problems. A minor irritation can lead to a major problem if not properly cared for early. When should you call for help? Call your doctor now or seek immediate medical care if:    · You have a foot sore, an ulcer or break in the skin that is not healing after 4 days, bleeding corns or calluses, or an ingrown toenail.     · You have blue or black areas, which can mean bruising or blood flow problems.     · You have peeling skin or tiny blisters between your toes or cracking or oozing of the skin.     · You have a fever for more than 24 hours and a foot sore.     · You have new numbness or tingling in your feet that does not go away after you move your feet or change positions.     · You have unexplained or unusual swelling of the foot or ankle. Watch closely for changes in your health, and be sure to contact your doctor if:    · You cannot do proper foot care. Where can you learn more? Go to http://www.gray.com/  Enter A739 in the search box to learn more about \"Diabetes Foot Health: Care Instructions. \"  Current as of: July 28, 2021               Content Version: 13.2  © 2006-2022 [employer]. Care instructions adapted under license by [employer] (which disclaims liability or warranty for this information). If you have questions about a medical condition or this instruction, always ask your healthcare professional. [place] [address] any warranty or liability for your use of this information. Patient Education        Learning About the Risk of Heart Attack and Stroke With Diabetes  How are diabetes, heart attack, and stroke connected? For some people, diabetes can cause problems that increase the risk of a heart attack or stroke. Many things can lead to a heart attack or stroke. These include high blood sugar, insulin resistance, high cholesterol, and high blood pressure. Lifestyle and genetics may also play a part. But here's the good news: The things you're doing to stay healthy with diabetes also help your heart and blood vessels. That means eating healthy foods, quitting smoking, getting exercise, and staying at a healthy weight. What increases your risk for heart attack and stroke? When you have diabetes, your risk for heart attack and stroke is even higher if you have:  · High blood pressure. It pushes blood through the arteries with too much force. Over time, this damages the walls of the arteries. · High cholesterol. It causes the buildup of a kind of fat inside the blood vessel walls. This buildup can lower blood flow to the heart muscle and raise your risk for having a heart attack or stroke. · Kidney damage.  It shares many of the risk factors for heart attack and stroke (such as high blood sugar, high blood pressure, and high cholesterol). How do you keep your heart healthy when you have diabetes? Managing your diabetes and keeping your heart and blood vessels healthy are both important. Here are some things you can do. · Test your blood sugar levels and get your diabetes tests on schedule. Try to keep your numbers within your target range. · Keep track of your blood pressure. Your doctor will give you a goal that's right for you. If your blood pressure is high, your treatment may also include medicine. Changes in your lifestyle, such as staying at a healthy weight, may also help you lower your blood pressure. · Eat heart-healthy foods. These include vegetables, fruit, nuts, beans, lean meat, fish, and whole grains. Limit sodium, alcohol, and sugar. · Work with your doctor or diabetes educator to learn which exercises are safe for you. Walking is a good choice. Bit by bit, increase the amount you walk every day. Try for at least 30 minutes on most days of the week. · Don't smoke. Smoking can make diabetes worse and increase your risk of heart attack or stroke. If you need help quitting, talk to your doctor about stop-smoking programs and medicines. These can increase your chances of quitting for good. · Take medicines as directed by your doctor. For example, your doctor may suggest taking a statin or daily aspirin. Some diabetes medicine can also lower your risk for heart attack and stroke. Where can you learn more? Go to http://www.gray.com/  Enter Z778 in the search box to learn more about \"Learning About the Risk of Heart Attack and Stroke With Diabetes. \"  Current as of: July 28, 2021               Content Version: 13.2  © 7507-5571 [employer]. Care instructions adapted under license by [employer] (which disclaims liability or warranty for this information).  If you have questions about a medical condition or this instruction, always ask your healthcare professional. [address] any warranty or liability for your use of this information. Patient Education        Diabetic Foot Ulcer: Care Instructions  Overview  Diabetes can damage the nerve endings and blood vessels in your feet. That means you are less likely to notice when your feet are injured. A small skin problem like a callus, blister, or cracked skin can turn into a larger sore, called a foot ulcer. Foot ulcers form most often on the pad (ball) of the foot or the bottom of the big toe. You can also get them on the top and bottom of each toe. Foot ulcers can get infected. If the infection is severe, then tissue in the foot can die. This is called gangrene. In that case, one or more of the toes, part or all of the foot, and sometimes part of the leg may have to be removed (amputated). Your doctor may have removed the dead tissue and cleaned the ulcer. Your foot wound may be wrapped in a protective bandage. It is very important to keep your weight off your injured foot. After a foot ulcer has formed, it will not heal as long as you keep putting weight on the area. Always get early treatment for foot problems. A minor irritation can lead to a major problem if it's not taken care of soon. Follow-up care is a key part of your treatment and safety. Be sure to make and go to all appointments, and call your doctor if you are having problems. It's also a good idea to know your test results and keep a list of the medicines you take. How can you care for yourself at home? · Follow your doctor's instructions about keeping pressure off the foot ulcer. You may need to use crutches or a wheelchair. Or you may wear a cast or a walking boot. · Follow your doctor's instructions on how to clean the ulcer and change the bandage. · If your doctor prescribed antibiotics, take them as directed.  Do not stop taking them just because you feel better. You need to take the full course of antibiotics. To prevent foot ulcers  · Keep your blood sugar in your target range by watching what and how much you eat. Track your blood sugar, take medicines if prescribed, and get regular exercise. · Do not smoke. Smoking affects blood flow and can make foot problems worse. If you need help quitting, talk to your doctor about stop-smoking programs and medicines. These can increase your chances of quitting for good. · Do not go barefoot. Protect your feet by wearing shoes that fit well. Choose shoes that are made of materials that are flexible and breathable, such as leather or cloth. · Inspect your feet daily for blisters, cuts, cracks, or sores. If you can't see well, use a mirror or have someone help you. · Have your doctor check your feet during each visit. If you have a foot problem, see your doctor. Do not try to treat your foot problem on your own. Home remedies or treatments that you can buy without a prescription (such as corn removers) can be harmful. When should you call for help? Call your doctor now or seek immediate medical care if:    · You have symptoms of infection, such as:  ? Increased pain, swelling, warmth, or redness. ? Red streaks leading from the area. ? Pus draining from the area. ? A fever. Watch closely for changes in your health, and be sure to contact your doctor if:    · You have a new problem with your feet, such as:  ? A new sore or ulcer. ? A break in the skin that is not healing after several days. ? Bleeding corns or calluses. ? An ingrown toenail.     · You do not get better as expected. Where can you learn more? Go to http://www.gray.com/  Enter T131 in the search box to learn more about \"Diabetic Foot Ulcer: Care Instructions. \"  Current as of: July 28, 2021               Content Version: 13.2  © 4362-7603 Healthwise, Incorporated.    Care instructions adapted under license by [employer] (which disclaims liability or warranty for this information). If you have questions about a medical condition or this instruction, always ask your healthcare professional. [address] any warranty or liability for your use of this information.

## 2022-06-21 NOTE — PROGRESS NOTES
Patient to be discharged to [facility]. Pt is alert and resting comfortably in bed. IV discontinued and family notified of discharge order. Transportation arranged via ambulance for 1800. Pt belongings packed up. Report called to oncoming nurse Kailyn Reynolds at [facility] facility. Discharge instructions reviewed with nurse including wound care performed by hospital staff for surgical sites and diabetic ulcer. Care assumed by oncoming staff.

## 2022-06-21 NOTE — PROGRESS NOTES
Patient examined this morning. Looks comfortable. Vital signs stable. Abdomen soft colostomy intact wounds are clean and dry. Left heel also examined. Wounds are very clean and pink and granulating. Patient is ready for discharge currently waiting for pending placement.

## 2022-06-21 NOTE — DISCHARGE SUMMARY
Admit date: 6/4/2022   Admitting Provider: Malik Ramos MD    Discharge date: 6/21/2022  Discharging Provider: Peace Petit PA-C      * Admission Diagnoses: Sigmoid volvulus ([facility] [K56.2]    * Discharge Diagnoses:    Hospital Problems as of 6/21/2022 Never Reviewed          Codes Class Noted - Resolved POA    Ulcer of heel due to diabetes mellitus ([facility] ICD-10-CM: E11.621, L97.409  ICD-9-CM: 250.80, 707.14  6/21/2022 - Present Unknown        Type 2 diabetes mellitus with stage 2 chronic kidney disease, with long-term current use of insulin (HCC) ICD-10-CM: E11.22, N18.2, Z79.4  ICD-9-CM: 250.40, 585.2, V58.67  6/10/2022 - Present Unknown        Hypokalemia ICD-10-CM: E87.6  ICD-9-CM: 276.8  6/10/2022 - Present Unknown        Foot ulcer due to secondary DM ([facility] ICD-10-CM: E13.621, L97.509  ICD-9-CM: 249.80, 707.15  6/10/2022 - Present Unknown        Stage 2 chronic kidney disease ICD-10-CM: N18.2  ICD-9-CM: 585.2  6/10/2022 - Present Unknown        Hypertension ICD-10-CM: I10  ICD-9-CM: 401.9  6/10/2022 - Present Unknown        * (Principal) Sigmoid volvulus ([facility] ICD-10-CM: K56.2  ICD-9-CM: 560.2  6/4/2022 - Present Unknown        Chronic kidney disease ICD-10-CM: N18.9  ICD-9-CM: 585.9  12/6/2021 - Present Unknown    Overview Signed 6/10/2022  3:28 PM by YI Villar     Formatting of this note might be different from the original.  - Patient has had stable creatinine trajectory with near normal eGFR given age  - Complications including hypertension, peripheral edema  a             Essential (primary) hypertension ICD-10-CM: I10  ICD-9-CM: 401.9  12/6/2021 - Present Unknown    Overview Signed 6/10/2022  3:28 PM by YI Villar     Formatting of this note might be different from the original.  - Current regimen: diltiazem 180mg bid, metoprolol succinate 50mg daily, spironolactone 25mg daily  - Office BP at 125/70     Plan:   Low salt diet   Continue with current regimen  Monitor BP at nursing home and patient visit to be accompanied by blood pressure logs                   * Hospital Course:   Kd Espino is a 12-year-old male with a past medical history of schizophrenia, hypertension, DM, and hyperlipidemia who presented with abdominal pain. ED vital signs unremarkable. Initial labs significant for creatinine of 2.02 and lactic of 3. Blood culture negative. CT abdomen/pelvis showed sigmoid volvulus. Patient admitted for further work-up. Patient started on Rocephin. NG tube and rectal tube placed. General surgery and GI consulted. Underwent sigmoidoscopy with decompression on 6/4. KUB showed enteric tube insertion, mild bibasilar atelectasis, persistent distention of small and large bowel. KUB 6/5 showed persistent sigmoid volvulus. CXR negative for acute pulmonary abnormality. Repeat CT abd/pelvis showed persistent sigmoid colon distention tapered narrowing for the sigmoid colon through pelvis again noted  pelvic small bowel loops filled and distended with oral contrast. Ceftriaxone discontinued and started on Zosyn for empiric coverage. ELVIRA showed mild PAD of bilateral LE. Exploratory laparotomy with a sigmoid colon resection and colostomy performed on 6/8. Urine culture negative. Rometta Favre SLP and nutrition consulted. SLP recommending easy to chew. PT/OT consulted. Left leg angiogram with angioplasty to TP trunk left heel wound debridement on 6/16. Daily dressing changes wet to dry saline gauze with kerlex.  Patient remains medically stable for discharge.           * Procedures:   Procedure(s):  ANGIOGRAPHY LOWER EXT LEFT  AORTAGRAM ABDOMINAL  ULTRASOUND GUIDED VASCULAR ACCESS  ANGIOPLASTY PERONEAL ARTERY      Consults:   Neurology and general surgery    Significant Diagnostic Studies: As discussed in hospital course    Discharge Exam:  Visit Vitals  /68   Pulse (!) 102   Temp 98.3 °F (36.8 °C)   Resp 18   Ht 5' 10\" (1.778 m)   Wt 83.9 kg (185 lb)   SpO2 100%   BMI 26.54 kg/m²     PHYSICAL EXAM:  Constitutional: Alert in no acute distress   HEENT: Sclerae anicteric, The neck is supple. Cardiovascular: Regular rate and rhythm. No murmurs, gallops, or rubs. Lee Rich Respiratory: Clear breath sounds with no wheezes, rales, or rhonchi. GI: Abdomen nondistended, soft, and nontender. Normal active bowel sounds. Colostomy present  Rectal: Deferred   Musculoskeletal: No pitting edema of the lower legs. Extremities have good range of motion. Neurological:  Patient is alert and oriented. Cranial nerves II-XII intact  Psychiatric: Mood appears appropriate with judgement intact. Lymphatic: No cervical or supraclavicular adenopathy. Skin: No rashes or breakdown of the skin      * Discharge Condition: stable  * Disposition: [place] ([facility])    Discharge Medications:  Current Discharge Medication List      START taking these medications    Details   clopidogreL (PLAVIX) 75 mg tab Take 1 Tablet by mouth daily. Qty: 30 Tablet, Refills: 1  Start date: 6/22/2022         CONTINUE these medications which have NOT CHANGED    Details   pregabalin (LYRICA) 100 mg capsule Take 100 mg by mouth two (2) times a day. atorvastatin (LIPITOR) 40 mg tablet Take 40 mg by mouth daily. bethanechol (URECHOLINE) 25 mg tablet Take 25 mg by mouth three (3) times daily. dilTIAZem ER (DILACOR XR) 180 mg capsule Take 180 mg by mouth daily. finasteride (PROSCAR) 5 mg tablet Take 5 mg by mouth daily. furosemide (LASIX) 40 mg tablet Take 40 mg by mouth daily. Levemir U-100 Insulin 100 unit/mL injection 14 Units by SubCUTAneous route daily. linaGLIPtin (Tradjenta) 5 mg tablet Take 5 mg by mouth daily. metFORMIN (GLUCOPHAGE) 500 mg tablet Take 500 mg by mouth two (2) times a day. metoprolol succinate (TOPROL-XL) 50 mg XL tablet Take 20 mg by mouth daily. oxybutynin (DITROPAN) 5 mg tablet Take 5 mg by mouth daily.       pantoprazole (PROTONIX) 20 mg tablet Take 20 mg by mouth daily.      potassium chloride (K-DUR, KLOR-CON M20) 20 mEq tablet Take 20 mEq by mouth daily. simethicone (GAS-X) 125 mg capsule Take 1 Capsule by mouth three (3) times daily. spironolactone (ALDACTONE) 25 mg tablet Take 25 mg by mouth daily. tamsulosin (FLOMAX) 0.4 mg capsule Take 0.4 mg by mouth daily. * Follow-up Care/Patient Instructions:   Activity: Activity as tolerated  Diet: Easy to chew food    Wound Care: None needed    Follow-up Information     Follow up With Specialties Details Why Contact Info    Ayaan Shukla MD Surgery Physician, General and Vascular Surgery In 1 week  [address]  [phone]      Dorothy Melton MD Family Medicine In 1 week  [address] [facility]  [phone]            Discharge summary greater than 35 minutes spent with the patient performing discharge instructions, medication review and physical exam    Signed:  Delilah Nuñez PA-C  6/21/2022  2:03 PM

## 2022-07-07 ENCOUNTER — OFFICE VISIT (OUTPATIENT)
Dept: SURGERY | Age: 72
End: 2022-07-07
Payer: MEDICARE

## 2022-07-07 VITALS
OXYGEN SATURATION: 98 % | DIASTOLIC BLOOD PRESSURE: 60 MMHG | TEMPERATURE: 97.8 F | HEART RATE: 64 BPM | RESPIRATION RATE: 18 BRPM | HEIGHT: 65 IN | SYSTOLIC BLOOD PRESSURE: 130 MMHG | BODY MASS INDEX: 30.79 KG/M2

## 2022-07-07 DIAGNOSIS — K56.2 SIGMOID VOLVULUS (HCC): Primary | ICD-10-CM

## 2022-07-07 PROCEDURE — 99024 POSTOP FOLLOW-UP VISIT: CPT | Performed by: SURGERY

## 2022-07-07 RX ORDER — HYDROXYZINE PAMOATE 50 MG/1
25 CAPSULE ORAL
COMMUNITY

## 2022-07-07 RX ORDER — BISMUTH SUBSALICYLATE 262 MG
1 TABLET,CHEWABLE ORAL DAILY
COMMUNITY

## 2022-07-07 RX ORDER — LANOLIN ALCOHOL/MO/W.PET/CERES
3 CREAM (GRAM) TOPICAL
COMMUNITY

## 2022-07-07 NOTE — PROGRESS NOTES
Chief Complaint   Patient presents with    Post OP Follow Up     06/08/2022 Laparotomy Exploratory, Sigmoidectomy with Colostomy     Visit Vitals  BP (!) 113/45 (BP 1 Location: Right arm, BP Patient Position: Sitting)   Pulse 64   Temp 97.8 °F (36.6 °C) (Temporal)   Resp 18   Ht 5' 5\" (1.651 m)   SpO2 98%   BMI 30.79 kg/m²

## 2022-07-08 NOTE — PROGRESS NOTES
SUBJECTIVE:  Patient had\" replacement pursuing problems and he is here today. He also had left leg angioplasty and wound debridement done on the left heel. Past Medical History:  No date: Anemia  No date: BPH (benign prostatic hyperplasia)  No date: Chronic kidney disease  No date: Diabetes ([facility] [address]  No date: Hypercholesteremia  No date: Hyperlipidemia  No date: Neurological disorder  No date: Nonspeaking deaf  No date: Schizophrenia ([facility] [address]  No past surgical history on file. @[facility] [place]@  Social History    Tobacco Use      Smoking status: Unknown If Ever Smoked      Smokeless tobacco: Never Used    Alcohol use: Not Currently    Prior to Admission medications :  Medication melatonin 3 mg tablet, Sig Take 3 mg by mouth nightly., Start Date , End Date , Taking? Yes, Authorizing Provider Provider, Historical    Medication multivitamin (ONE A DAY) tablet, Sig Take 1 Tablet by mouth daily. , Start Date , End Date , Taking? Yes, Authorizing Provider Provider, Historical    Medication collagenase (SantyL) 250 unit/gram ointment, Sig Apply  to affected area daily. , Start Date , End Date , Taking? Yes, Authorizing Provider Provider, Historical    Medication hydrOXYzine pamoate (VistariL) 50 mg capsule, Sig Take 25 mg by mouth three (3) times daily as needed., Start Date , End Date , Taking? Yes, Authorizing Provider Provider, Historical    Medication clopidogreL (PLAVIX) 75 mg tab, Sig Take 1 Tablet by mouth daily. , Start Date 6/22/22, End Date , Taking? Yes, Authorizing Provider Reasoner, Alma Marie PA-C    Medication atorvastatin (LIPITOR) 40 mg tablet, Sig Take 40 mg by mouth daily. , Start Date 5/20/22, End Date , Taking? Yes, Authorizing Provider Provider, Historical    Medication bethanechol (URECHOLINE) 25 mg tablet, Sig Take 25 mg by mouth three (3) times daily. , Start Date 5/14/22, End Date , Taking?  Yes, Authorizing Provider Provider, Historical    Medication dilTIAZem ER (DILACOR XR) 180 mg capsule, Sig Take 180 mg by mouth daily. , Start Date 5/22/22, End Date , Taking? Yes, Authorizing Provider Provider, Historical    Medication finasteride (PROSCAR) 5 mg tablet, Sig Take 5 mg by mouth daily. , Start Date 5/11/22, End Date , Taking? Yes, Authorizing Provider Provider, Historical    Medication furosemide (LASIX) 40 mg tablet, Sig Take 40 mg by mouth daily. , Start Date 5/30/22, End Date , Taking? Yes, Authorizing Provider Provider, Historical    Medication Levemir U-100 Insulin 100 unit/mL injection, Sig 14 Units by SubCUTAneous route daily. , Start Date 5/25/22, End Date , Taking? Yes, Authorizing Provider Provider, Historical    Medication linaGLIPtin (Tradjenta) 5 mg tablet, Sig Take 5 mg by mouth daily. , Start Date , End Date , Taking? Yes, Authorizing Provider Provider, Historical    Medication metFORMIN (GLUCOPHAGE) 500 mg tablet, Sig Take 500 mg by mouth two (2) times a day., Start Date 3/13/22, End Date , Taking? Yes, Authorizing Provider Provider, Historical    Medication metoprolol succinate (TOPROL-XL) 50 mg XL tablet, Sig Take 20 mg by mouth daily. , Start Date 3/15/22, End Date , Taking? Yes, Authorizing Provider Provider, Historical    Medication oxybutynin (DITROPAN) 5 mg tablet, Sig Take 5 mg by mouth daily. , Start Date 5/21/22, End Date , Taking? Yes, Authorizing Provider Provider, Historical    Medication pantoprazole (PROTONIX) 20 mg tablet, Sig Take 20 mg by mouth daily. , Start Date 4/30/22, End Date , Taking? Yes, Authorizing Provider Provider, Historical    Medication potassium chloride (K-DUR, KLOR-CON M20) 20 mEq tablet, Sig Take 20 mEq by mouth daily. , Start Date 5/9/22, End Date , Taking? Yes, Authorizing Provider Provider, Historical    Medication simethicone (GAS-X) 125 mg capsule, Sig Take 1 Capsule by mouth three (3) times daily. , Start Date , End Date , Taking? Yes, Authorizing Provider Provider, Historical    Medication spironolactone (ALDACTONE) 25 mg tablet, Sig Take 25 mg by mouth daily. , Start Date 5/20/22, End Date , Taking? Yes, Authorizing Provider Provider, Historical    Medication tamsulosin (FLOMAX) 0.4 mg capsule, Sig Take 0.4 mg by mouth daily. , Start Date 5/20/22, End Date , Taking? Yes, Authorizing Provider Provider, Historical    Medication pregabalin (LYRICA) 100 mg capsule, Sig Take 100 mg by mouth two (2) times a day., Start Date , End Date , Taking? Yes, Authorizing Provider Provider, Historical       -- Lisinopril -- Unknown (comments)      OBJECTIVE:  /60 (BP 1 Location: Left arm, BP Patient Position: Lying)   Pulse 64   Temp 97.8 °F (36.6 °C) (Temporal)   Resp 18   Ht 5' 5\" (1.651 m)   SpO2 98%   BMI 30.79 kg/m²     PT IS PLEASANT AND AWAKE AND ALERT. WOUND EXAM:  Left leg wounds are clean and dry granulating. Colostomy is pink and viable. ASSESSMENT:  Problem List Items Addressed This Visit        Digestive    Sigmoid volvulus ([facility] [address] - Primary          PLAN:  Patient will be reassessed 2 weeks follow-up for left leg issues. At some point he needs to have colostomy reversal.  Most likely about 6-month time. For now we will change wound care to Aquacel Ag to be applied to left heel daily. Patient will be reassessed in 2 weeks.

## 2022-07-25 ENCOUNTER — OFFICE VISIT (OUTPATIENT)
Dept: SURGERY | Age: 72
End: 2022-07-25
Payer: MEDICARE

## 2022-07-25 VITALS
HEART RATE: 67 BPM | DIASTOLIC BLOOD PRESSURE: 74 MMHG | SYSTOLIC BLOOD PRESSURE: 142 MMHG | TEMPERATURE: 99 F | OXYGEN SATURATION: 98 %

## 2022-07-25 DIAGNOSIS — E13.621: Primary | ICD-10-CM

## 2022-07-25 DIAGNOSIS — L97.406: Primary | ICD-10-CM

## 2022-07-25 PROCEDURE — G8427 DOCREV CUR MEDS BY ELIG CLIN: HCPCS | Performed by: SURGERY

## 2022-07-25 PROCEDURE — 3017F COLORECTAL CA SCREEN DOC REV: CPT | Performed by: SURGERY

## 2022-07-25 PROCEDURE — 99213 OFFICE O/P EST LOW 20 MIN: CPT | Performed by: SURGERY

## 2022-07-25 PROCEDURE — 2022F DILAT RTA XM EVC RTNOPTHY: CPT | Performed by: SURGERY

## 2022-07-25 PROCEDURE — G8754 DIAS BP LESS 90: HCPCS | Performed by: SURGERY

## 2022-07-25 PROCEDURE — 3051F HG A1C>EQUAL 7.0%<8.0%: CPT | Performed by: SURGERY

## 2022-07-25 PROCEDURE — G8753 SYS BP > OR = 140: HCPCS | Performed by: SURGERY

## 2022-07-25 PROCEDURE — 1101F PT FALLS ASSESS-DOCD LE1/YR: CPT | Performed by: SURGERY

## 2022-07-25 PROCEDURE — G8536 NO DOC ELDER MAL SCRN: HCPCS | Performed by: SURGERY

## 2022-07-25 PROCEDURE — 1123F ACP DISCUSS/DSCN MKR DOCD: CPT | Performed by: SURGERY

## 2022-07-25 PROCEDURE — G8417 CALC BMI ABV UP PARAM F/U: HCPCS | Performed by: SURGERY

## 2022-07-25 PROCEDURE — G8432 DEP SCR NOT DOC, RNG: HCPCS | Performed by: SURGERY

## 2022-07-25 NOTE — PROGRESS NOTES
Chief Complaint   Patient presents with    Wound Check     2 weeks - check feet     Visit Vitals  BP (!) 142/74 (BP 1 Location: Left upper arm, BP Patient Position: Sitting, BP Cuff Size: Adult)   Pulse 67   Temp 99 °F (37.2 °C) (Temporal)   SpO2 98%

## 2022-07-25 NOTE — PROGRESS NOTES
VASCULAR FOLLOW UP      Subjective:   CHIEF COMPLAINTS:  Left heel ulcer  PRESENTATION OF ILLNESS:  Lanre Cross is a 66 y.o. very pleasant man who had a left leg angiogram and wound debridement done on the left heel and he is here today follow-up. He is doing well. He is currently nursing home. He is nonverbal.    Past Medical History:   Diagnosis Date    Anemia     BPH (benign prostatic hyperplasia)     Chronic kidney disease     Diabetes ([address]     Hypercholesteremia     Hyperlipidemia     Neurological disorder     Nonspeaking deaf     Schizophrenia [facility])       History reviewed. No pertinent surgical history. Family History   Family history unknown: Yes      Social History     Tobacco Use    Smoking status: Unknown    Smokeless tobacco: Never   Substance Use Topics    Alcohol use: Not Currently       Prior to Admission medications    Medication Sig Start Date End Date Taking? Authorizing Provider   melatonin 3 mg tablet Take 3 mg by mouth nightly. Yes Provider, Historical   multivitamin (ONE A DAY) tablet Take 1 Tablet by mouth daily. Yes Provider, Historical   collagenase (SANTYL) 250 unit/gram ointment Apply  to affected area daily. Yes Provider, Historical   hydrOXYzine pamoate (VISTARIL) 50 mg capsule Take 25 mg by mouth three (3) times daily as needed. Yes Provider, Historical   clopidogreL (PLAVIX) 75 mg tab Take 1 Tablet by mouth daily. 6/22/22  Yes Reasoner, Kodak Morley PA-C   atorvastatin (LIPITOR) 40 mg tablet Take 40 mg by mouth daily. 5/20/22  Yes Provider, Historical   bethanechol (URECHOLINE) 25 mg tablet Take 25 mg by mouth three (3) times daily. 5/14/22  Yes Provider, Historical   dilTIAZem ER (DILACOR XR) 180 mg capsule Take 180 mg by mouth daily. 5/22/22  Yes Provider, Historical   finasteride (PROSCAR) 5 mg tablet Take 5 mg by mouth daily. 5/11/22  Yes Provider, Historical   furosemide (LASIX) 40 mg tablet Take 40 mg by mouth daily.  5/30/22  Yes Provider, Historical   Levemir U-100 Insulin 100 unit/mL injection 14 Units by SubCUTAneous route daily. 5/25/22  Yes Provider, Historical   linaGLIPtin (Tradjenta) 5 mg tablet Take 5 mg by mouth daily. Yes Provider, Historical   metFORMIN (GLUCOPHAGE) 500 mg tablet Take 500 mg by mouth two (2) times a day. 3/13/22  Yes Provider, Historical   metoprolol succinate (TOPROL-XL) 50 mg XL tablet Take 20 mg by mouth daily. 3/15/22  Yes Provider, Historical   oxybutynin (DITROPAN) 5 mg tablet Take 5 mg by mouth daily. 5/21/22  Yes Provider, Historical   pantoprazole (PROTONIX) 20 mg tablet Take 20 mg by mouth daily. 4/30/22  Yes Provider, Historical   potassium chloride (K-DUR, KLOR-CON M20) 20 mEq tablet Take 20 mEq by mouth daily. 5/9/22  Yes Provider, Historical   simethicone (GAS-X) 125 mg capsule Take 1 Capsule by mouth three (3) times daily. Yes Provider, Historical   spironolactone (ALDACTONE) 25 mg tablet Take 25 mg by mouth daily. 5/20/22  Yes Provider, Historical   tamsulosin (FLOMAX) 0.4 mg capsule Take 0.4 mg by mouth daily. 5/20/22  Yes Provider, Historical   pregabalin (LYRICA) 100 mg capsule Take 100 mg by mouth two (2) times a day. Yes Provider, Historical     Allergies   Allergen Reactions    Lisinopril Unknown (comments)        Review of Systems:  I reviewed the rest of organ systems personally and they were negative signed by Dr. Esquivel Forth    Objective:   Visit Vitals  BP (!) 142/74 (BP 1 Location: Left upper arm, BP Patient Position: Sitting, BP Cuff Size: Adult)   Pulse 67   Temp 99 °F (37.2 °C) (Temporal)   SpO2 98%     VITAL SIGNS REVIEWED. Physical Exam:  Patient is well-nourished pleasant in conversation is appropriate. Head and neck examination atraumatic, normocephalic. Gaze appropriate. Conversation appropriate. Neck examination shows supple. No mass. No obvious carotid bruit. Chest examination shows lungs are clear bilaterally well-expanded, no crackles or wheezes.   Cardiovascular system regular rate, no obvious murmur. Skin warm to touch  and moist, no skin lesions. Abdomen is soft ,not tender or distended bowel sounds present. No palpable mass. Neurological examinations, no focal neuro deficits moving all 4 extremities. Cranial nerves intact. Sensation is intact as well. Hematologic: No obvious bruise or swelling or obvious lymphadenopathy. Psychosocial: Appropriate. Has good effect. Musculoskeletal system: No muscle wasting, appropriate movements upper and lower extremity. Vascular examination: I examined the patient left leg. The wounds are granulating well. Data Review:   No visits with results within 1 Month(s) from this visit. Latest known visit with results is:   No results displayed because visit has over 200 results. Assessment:     Problem List Items Addressed This Visit          Endocrine    Ulcer of heel due to diabetes mellitus ([facility] [address] - Primary           Plan:     Continue wound care at this time we will start her on Aquacel Ag 4 x 4 and Kerlix roll. I anticipate wound should close next visit. Next visit will be 2 months from now.         Sandi Antoine MD

## 2022-09-26 ENCOUNTER — OFFICE VISIT (OUTPATIENT)
Dept: SURGERY | Age: 72
End: 2022-09-26
Payer: MEDICARE

## 2022-09-26 VITALS
HEART RATE: 80 BPM | RESPIRATION RATE: 20 BRPM | SYSTOLIC BLOOD PRESSURE: 148 MMHG | TEMPERATURE: 98 F | DIASTOLIC BLOOD PRESSURE: 80 MMHG | OXYGEN SATURATION: 98 %

## 2022-09-26 DIAGNOSIS — I73.9 PERIPHERAL VASCULAR DISEASE (HCC): ICD-10-CM

## 2022-09-26 DIAGNOSIS — L97.509 FOOT ULCER DUE TO SECONDARY DM (HCC): Primary | ICD-10-CM

## 2022-09-26 DIAGNOSIS — E13.621 FOOT ULCER DUE TO SECONDARY DM (HCC): Primary | ICD-10-CM

## 2022-09-26 PROCEDURE — G8754 DIAS BP LESS 90: HCPCS | Performed by: SURGERY

## 2022-09-26 PROCEDURE — G8753 SYS BP > OR = 140: HCPCS | Performed by: SURGERY

## 2022-09-26 PROCEDURE — G8417 CALC BMI ABV UP PARAM F/U: HCPCS | Performed by: SURGERY

## 2022-09-26 PROCEDURE — G8427 DOCREV CUR MEDS BY ELIG CLIN: HCPCS | Performed by: SURGERY

## 2022-09-26 PROCEDURE — 3017F COLORECTAL CA SCREEN DOC REV: CPT | Performed by: SURGERY

## 2022-09-26 PROCEDURE — G8432 DEP SCR NOT DOC, RNG: HCPCS | Performed by: SURGERY

## 2022-09-26 PROCEDURE — 99213 OFFICE O/P EST LOW 20 MIN: CPT | Performed by: SURGERY

## 2022-09-26 PROCEDURE — 1101F PT FALLS ASSESS-DOCD LE1/YR: CPT | Performed by: SURGERY

## 2022-09-26 PROCEDURE — 2022F DILAT RTA XM EVC RTNOPTHY: CPT | Performed by: SURGERY

## 2022-09-26 PROCEDURE — G8536 NO DOC ELDER MAL SCRN: HCPCS | Performed by: SURGERY

## 2022-09-26 PROCEDURE — 3051F HG A1C>EQUAL 7.0%<8.0%: CPT | Performed by: SURGERY

## 2022-09-26 PROCEDURE — 1123F ACP DISCUSS/DSCN MKR DOCD: CPT | Performed by: SURGERY

## 2022-09-26 RX ORDER — INSULIN GLARGINE 100 [IU]/ML
14 INJECTION, SOLUTION SUBCUTANEOUS DAILY
COMMUNITY

## 2022-09-26 RX ORDER — FERROUS SULFATE 325(65) MG
325 TABLET, DELAYED RELEASE (ENTERIC COATED) ORAL 2 TIMES DAILY
COMMUNITY

## 2022-09-26 RX ORDER — ASCORBIC ACID 500 MG
500 TABLET ORAL 2 TIMES DAILY
COMMUNITY

## 2022-09-26 RX ORDER — DOCUSATE SODIUM 100 MG/1
100 CAPSULE, LIQUID FILLED ORAL 2 TIMES DAILY
COMMUNITY

## 2022-09-26 RX ORDER — TRAMADOL HYDROCHLORIDE 50 MG/1
50 TABLET ORAL
COMMUNITY

## 2022-09-30 PROBLEM — I73.9 PERIPHERAL VASCULAR DISEASE (HCC): Status: ACTIVE | Noted: 2022-09-30

## 2022-09-30 NOTE — PROGRESS NOTES
VASCULAR FOLLOW UP      Subjective:   CHIEF COMPLAINTS:  Recurrent wound on the left leg. PRESENTATION OF ILLNESS:  Jhon Manley is a 66 y.o. very pleasant man is here today for follow-up. Patient had a previous left leg angioplasty about 3-month ago that time wound has healing well with granulation tissue. Now he has a recurrent wound. Now the wound exposed with tender on the arteries tendon area. Patient also has colostomy. Past Medical History:   Diagnosis Date    Anemia     BPH (benign prostatic hyperplasia)     Chronic kidney disease     Diabetes ([facility] [address]     Hypercholesteremia     Hyperlipidemia     Neurological disorder     Nonspeaking deaf     Schizophrenia ([facility] [address]       No past surgical history on file. Family History   Family history unknown: Yes      Social History     Tobacco Use    Smoking status: Unknown    Smokeless tobacco: Never   Substance Use Topics    Alcohol use: Not Currently       Prior to Admission medications    Medication Sig Start Date End Date Taking? Authorizing Provider   ascorbic acid, vitamin C, (VITAMIN C) 500 mg tablet Take 500 mg by mouth two (2) times a day. Yes Provider, Historical   traMADoL (ULTRAM) 50 mg tablet Take 50 mg by mouth every six (6) hours as needed for Pain. Yes Provider, Historical   insulin regular (NovoLIN R Regular U-100 Insuln) 100 unit/mL injection by SubCUTAneous route. Use as sliding scale   Yes Provider, Historical   ferrous sulfate (IRON) 325 mg (65 mg iron) EC tablet Take 325 mg by mouth two (2) times a day. Yes Provider, Historical   docusate sodium (COLACE) 100 mg capsule Take 100 mg by mouth two (2) times a day. Yes Provider, Historical   insulin glargine (LANTUS) 100 unit/mL injection 14 Units by SubCUTAneous route daily. Yes Provider, Historical   melatonin 3 mg tablet Take 3 mg by mouth nightly. Yes Provider, Historical   multivitamin (ONE A DAY) tablet Take 1 Tablet by mouth daily.    Yes Provider, Historical   collagenase (SANTYL) 250 unit/gram ointment Apply  to affected area daily. Yes Provider, Historical   clopidogreL (PLAVIX) 75 mg tab Take 1 Tablet by mouth daily. 6/22/22  Yes Reasoner, Brittani Faith PA-C   atorvastatin (LIPITOR) 40 mg tablet Take 40 mg by mouth daily. 5/20/22  Yes Provider, Historical   bethanechol (URECHOLINE) 25 mg tablet Take 25 mg by mouth three (3) times daily. 5/14/22  Yes Provider, Historical   dilTIAZem ER (DILACOR XR) 180 mg capsule Take 180 mg by mouth daily. 5/22/22  Yes Provider, Historical   finasteride (PROSCAR) 5 mg tablet Take 5 mg by mouth daily. 5/11/22  Yes Provider, Historical   furosemide (LASIX) 40 mg tablet Take 40 mg by mouth daily. 5/30/22  Yes Provider, Historical   linaGLIPtin (Tradjenta) 5 mg tablet Take 5 mg by mouth daily. Yes Provider, Historical   metFORMIN (GLUCOPHAGE) 500 mg tablet Take 500 mg by mouth two (2) times a day. 3/13/22  Yes Provider, Historical   metoprolol succinate (TOPROL-XL) 50 mg XL tablet Take 20 mg by mouth daily. 3/15/22  Yes Provider, Historical   oxybutynin (DITROPAN) 5 mg tablet Take 5 mg by mouth daily. 5/21/22  Yes Provider, Historical   pantoprazole (PROTONIX) 20 mg tablet Take 20 mg by mouth daily. 4/30/22  Yes Provider, Historical   potassium chloride (K-DUR, KLOR-CON M20) 20 mEq tablet Take 20 mEq by mouth daily. 5/9/22  Yes Provider, Historical   simethicone (GAS-X) 125 mg capsule Take 1 Capsule by mouth three (3) times daily. Yes Provider, Historical   spironolactone (ALDACTONE) 25 mg tablet Take 25 mg by mouth daily. 5/20/22  Yes Provider, Historical   tamsulosin (FLOMAX) 0.4 mg capsule Take 0.4 mg by mouth daily. 5/20/22  Yes Provider, Historical   pregabalin (LYRICA) 100 mg capsule Take 100 mg by mouth two (2) times a day. Yes Provider, Historical   hydrOXYzine pamoate (VISTARIL) 50 mg capsule Take 25 mg by mouth three (3) times daily as needed.   Patient not taking: Reported on 9/26/2022    Provider, Historical   Levemir U-100 Insulin 100 unit/mL injection 14 Units by SubCUTAneous route daily. Patient not taking: Reported on 9/26/2022 5/25/22   Provider, Historical     Allergies   Allergen Reactions    Lisinopril Unknown (comments)     Unknown-pt unable to answer        Review of Systems:  I reviewed the rest of organ systems personally and they were negative signed by Dr. Juan Carlos Roach    Objective:   Visit Vitals  BP (!) 148/80 (BP 1 Location: Right upper arm, BP Patient Position: Lying, BP Cuff Size: Adult)   Pulse 80   Temp 98 °F (36.7 °C) (Temporal)   Resp 20   SpO2 98%     VITAL SIGNS REVIEWED. Physical Exam:  Patient is well-nourished pleasant in conversation is appropriate. Head and neck examination atraumatic, normocephalic. Gaze appropriate. Conversation appropriate. Neck examination shows supple. No mass. No obvious carotid bruit. Chest examination shows lungs are clear bilaterally well-expanded, no crackles or wheezes. Cardiovascular system regular rate, no obvious murmur. Skin warm to touch  and moist, no skin lesions. Abdomen is soft ,not tender or distended bowel sounds present. No palpable mass. Neurological examinations, no focal neuro deficits moving all 4 extremities. Cranial nerves intact. Sensation is intact as well. Hematologic: No obvious bruise or swelling or obvious lymphadenopathy. Psychosocial: Appropriate. Has good effect. Musculoskeletal system: No muscle wasting, appropriate movements upper and lower extremity. Vascular examination: Patient does have a Doppler signal noted. Patient has exposed tendon on the left heel I did a bedside wound debridement. Excisional wound debridement done on the exposed tendon with Metzenbaum scissors and pickups. Wounds were then covered with Medihoney ointment. Data Review:   No visits with results within 1 Month(s) from this visit. Latest known visit with results is:   No results displayed because visit has over 200 results.            Assessment:     Problem List Items Addressed This Visit          Circulatory    Peripheral vascular disease ([facility] [address]       Endocrine    Foot ulcer due to secondary DM ([facility] [address] - Primary    Relevant Medications    insulin regular (NovoLIN R Regular U-100 Insuln) 100 unit/mL injection    insulin glargine (LANTUS) 100 unit/mL injection           Plan:     Patient is started back on Bactrim double strength twice daily. And I will continue wound care with the Medihoney ointments. And the patient will be reassessed next week. If wound does not appear to be improving possibly discuss repeating angioplasty on the left leg.         Donato Shine MD

## 2022-10-03 ENCOUNTER — OFFICE VISIT (OUTPATIENT)
Dept: SURGERY | Age: 72
End: 2022-10-03
Payer: MEDICARE

## 2022-10-03 VITALS
BODY MASS INDEX: 30.79 KG/M2 | SYSTOLIC BLOOD PRESSURE: 114 MMHG | TEMPERATURE: 97.5 F | OXYGEN SATURATION: 92 % | HEIGHT: 65 IN | DIASTOLIC BLOOD PRESSURE: 58 MMHG | HEART RATE: 70 BPM

## 2022-10-03 DIAGNOSIS — I73.9 PERIPHERAL VASCULAR DISEASE (HCC): Primary | ICD-10-CM

## 2022-10-03 DIAGNOSIS — L97.509 FOOT ULCER DUE TO SECONDARY DM (HCC): ICD-10-CM

## 2022-10-03 DIAGNOSIS — E13.621 FOOT ULCER DUE TO SECONDARY DM (HCC): ICD-10-CM

## 2022-10-03 PROCEDURE — G8752 SYS BP LESS 140: HCPCS | Performed by: SURGERY

## 2022-10-03 PROCEDURE — 3017F COLORECTAL CA SCREEN DOC REV: CPT | Performed by: SURGERY

## 2022-10-03 PROCEDURE — 1101F PT FALLS ASSESS-DOCD LE1/YR: CPT | Performed by: SURGERY

## 2022-10-03 PROCEDURE — 99213 OFFICE O/P EST LOW 20 MIN: CPT | Performed by: SURGERY

## 2022-10-03 PROCEDURE — 1123F ACP DISCUSS/DSCN MKR DOCD: CPT | Performed by: SURGERY

## 2022-10-03 PROCEDURE — G8432 DEP SCR NOT DOC, RNG: HCPCS | Performed by: SURGERY

## 2022-10-03 PROCEDURE — G8427 DOCREV CUR MEDS BY ELIG CLIN: HCPCS | Performed by: SURGERY

## 2022-10-03 PROCEDURE — G8417 CALC BMI ABV UP PARAM F/U: HCPCS | Performed by: SURGERY

## 2022-10-03 PROCEDURE — 3051F HG A1C>EQUAL 7.0%<8.0%: CPT | Performed by: SURGERY

## 2022-10-03 PROCEDURE — G8536 NO DOC ELDER MAL SCRN: HCPCS | Performed by: SURGERY

## 2022-10-03 PROCEDURE — G8754 DIAS BP LESS 90: HCPCS | Performed by: SURGERY

## 2022-10-03 PROCEDURE — 2022F DILAT RTA XM EVC RTNOPTHY: CPT | Performed by: SURGERY

## 2022-10-03 NOTE — PROGRESS NOTES
Chief Complaint   Patient presents with    Wound Check     Visit Vitals  BP (!) 114/58 (BP 1 Location: Left upper arm, BP Patient Position: Supine, BP Cuff Size: Adult)   Pulse 70   Temp 97.5 °F (36.4 °C) (Temporal)   Ht 5' 5\" (1.651 m)   SpO2 92% Comment: cold fingers   BMI 30.79 kg/m²

## 2022-10-06 NOTE — PROGRESS NOTES
VASCULAR FOLLOW UP      Subjective:   CHIEF COMPLAINTS:  Left heel wound  PRESENTATION OF ILLNESS:  Dorrie Osler is a 66 y.o. very pleasant gentleman is here today with his sister examined the left heel and left Achilles tendon area. Last week I did a bedside wound debridement. Since last visit wound care has been performed on the left foot. Denies any fever chills. Past Medical History:   Diagnosis Date    Anemia     BPH (benign prostatic hyperplasia)     Chronic kidney disease     Diabetes ([address]     Hypercholesteremia     Hyperlipidemia     Neurological disorder     Nonspeaking deaf     Schizophrenia [facility])       History reviewed. No pertinent surgical history. Family History   Family history unknown: Yes      Social History     Tobacco Use    Smoking status: Unknown    Smokeless tobacco: Never   Substance Use Topics    Alcohol use: Not Currently       Prior to Admission medications    Medication Sig Start Date End Date Taking? Authorizing Provider   ascorbic acid, vitamin C, (VITAMIN C) 500 mg tablet Take 500 mg by mouth two (2) times a day. Yes Provider, Historical   traMADoL (ULTRAM) 50 mg tablet Take 50 mg by mouth every six (6) hours as needed for Pain. Yes Provider, Historical   insulin regular (NOVOLIN R, HUMULIN R) 100 unit/mL injection by SubCUTAneous route. Use as sliding scale   Yes Provider, Historical   ferrous sulfate (IRON) 325 mg (65 mg iron) EC tablet Take 325 mg by mouth two (2) times a day. Yes Provider, Historical   docusate sodium (COLACE) 100 mg capsule Take 100 mg by mouth two (2) times a day. Yes Provider, Historical   insulin glargine (LANTUS) 100 unit/mL injection 14 Units by SubCUTAneous route daily. Yes Provider, Historical   melatonin 3 mg tablet Take 3 mg by mouth nightly. Yes Provider, Historical   multivitamin (ONE A DAY) tablet Take 1 Tablet by mouth daily. Yes Provider, Historical   clopidogreL (PLAVIX) 75 mg tab Take 1 Tablet by mouth daily.  6/22/22  Yes Yudy Moseley PA-C   atorvastatin (LIPITOR) 40 mg tablet Take 40 mg by mouth daily. 5/20/22  Yes Provider, Historical   bethanechol (URECHOLINE) 25 mg tablet Take 25 mg by mouth three (3) times daily. 5/14/22  Yes Provider, Historical   finasteride (PROSCAR) 5 mg tablet Take 5 mg by mouth daily. 5/11/22  Yes Provider, Historical   furosemide (LASIX) 40 mg tablet Take 40 mg by mouth daily. 5/30/22  Yes Provider, Historical   linaGLIPtin (Tradjenta) 5 mg tablet Take 5 mg by mouth daily. Yes Provider, Historical   metFORMIN (GLUCOPHAGE) 500 mg tablet Take 500 mg by mouth two (2) times a day. 3/13/22  Yes Provider, Historical   metoprolol succinate (TOPROL-XL) 50 mg XL tablet Take 20 mg by mouth daily. 3/15/22  Yes Provider, Historical   oxybutynin (DITROPAN) 5 mg tablet Take 5 mg by mouth daily. 5/21/22  Yes Provider, Historical   pantoprazole (PROTONIX) 20 mg tablet Take 20 mg by mouth daily. 4/30/22  Yes Provider, Historical   potassium chloride (K-DUR, KLOR-CON M20) 20 mEq tablet Take 20 mEq by mouth daily. 5/9/22  Yes Provider, Historical   simethicone (GAS-X) 125 mg capsule Take 1 Capsule by mouth three (3) times daily. Yes Provider, Historical   spironolactone (ALDACTONE) 25 mg tablet Take 25 mg by mouth daily. 5/20/22  Yes Provider, Historical   tamsulosin (FLOMAX) 0.4 mg capsule Take 0.4 mg by mouth daily. 5/20/22  Yes Provider, Historical   pregabalin (LYRICA) 100 mg capsule Take 100 mg by mouth two (2) times a day. Yes Provider, Historical   collagenase (SANTYL) 250 unit/gram ointment Apply  to affected area daily. Patient not taking: Reported on 10/3/2022    Provider, Historical   hydrOXYzine pamoate (VISTARIL) 50 mg capsule Take 25 mg by mouth three (3) times daily as needed. Patient not taking: No sig reported    Provider, Historical   dilTIAZem ER (DILACOR XR) 180 mg capsule Take 180 mg by mouth daily.   Patient not taking: Reported on 10/3/2022 5/22/22   Provider, Historical   Levemir U-100 Insulin 100 unit/mL injection 14 Units by SubCUTAneous route daily. Patient not taking: No sig reported 5/25/22   Provider, Historical     Allergies   Allergen Reactions    Lisinopril Unknown (comments)     Unknown-pt unable to answer        Review of Systems:  I reviewed the rest of organ systems personally and they were negative signed by Dr. Viv Lundberg    Objective:   Visit Vitals  BP (!) 114/58 (BP 1 Location: Left upper arm, BP Patient Position: Supine, BP Cuff Size: Adult)   Pulse 70   Temp 97.5 °F (36.4 °C) (Temporal)   Ht 5' 5\" (1.651 m)   SpO2 92% Comment: cold fingers   BMI 30.79 kg/m²     VITAL SIGNS REVIEWED. Physical Exam:  Patient is well-nourished pleasant in conversation is appropriate. Head and neck examination atraumatic, normocephalic. Gaze appropriate. Conversation appropriate. Neck examination shows supple. No mass. No obvious carotid bruit. Chest examination shows lungs are clear bilaterally well-expanded, no crackles or wheezes. Cardiovascular system regular rate, no obvious murmur. Skin warm to touch  and moist, no skin lesions. Abdomen is soft ,not tender or distended bowel sounds present. No palpable mass. Neurological examinations, no focal neuro deficits moving all 4 extremities. Cranial nerves intact. Sensation is intact as well. Hematologic: No obvious bruise or swelling or obvious lymphadenopathy. Psychosocial: Appropriate. Has good effect. Musculoskeletal system: No muscle wasting, appropriate movements upper and lower extremity. Vascular examination: I examined the patient left leg. Left leg wound appears to be more granulating. Data Review:   No visits with results within 1 Month(s) from this visit. Latest known visit with results is:   No results displayed because visit has over 200 results.            Assessment:     Problem List Items Addressed This Visit          Circulatory    Peripheral vascular disease ([facility] [address] - Primary       Endocrine    Foot ulcer due to secondary DM [facility])           Plan:     Family was informed that the wound care needs to be done daily basis. Patient currently wearing bunny boots as well. Patient be reassessed 1 week follow-up. If wound does not show significant provement possible discussed repeating angiogram on the left leg.     Sara Sanchez MD

## 2022-10-10 ENCOUNTER — OFFICE VISIT (OUTPATIENT)
Dept: SURGERY | Age: 72
End: 2022-10-10
Payer: MEDICARE

## 2022-10-10 VITALS
HEART RATE: 90 BPM | OXYGEN SATURATION: 99 % | DIASTOLIC BLOOD PRESSURE: 77 MMHG | TEMPERATURE: 98.4 F | BODY MASS INDEX: 30.79 KG/M2 | SYSTOLIC BLOOD PRESSURE: 159 MMHG | HEIGHT: 65 IN

## 2022-10-10 DIAGNOSIS — N18.2 STAGE 2 CHRONIC KIDNEY DISEASE: ICD-10-CM

## 2022-10-10 DIAGNOSIS — E13.621: Primary | ICD-10-CM

## 2022-10-10 DIAGNOSIS — L97.406: Primary | ICD-10-CM

## 2022-10-10 PROCEDURE — G8417 CALC BMI ABV UP PARAM F/U: HCPCS | Performed by: SURGERY

## 2022-10-10 PROCEDURE — 2022F DILAT RTA XM EVC RTNOPTHY: CPT | Performed by: SURGERY

## 2022-10-10 PROCEDURE — G8754 DIAS BP LESS 90: HCPCS | Performed by: SURGERY

## 2022-10-10 PROCEDURE — G8753 SYS BP > OR = 140: HCPCS | Performed by: SURGERY

## 2022-10-10 PROCEDURE — G8432 DEP SCR NOT DOC, RNG: HCPCS | Performed by: SURGERY

## 2022-10-10 PROCEDURE — 3017F COLORECTAL CA SCREEN DOC REV: CPT | Performed by: SURGERY

## 2022-10-10 PROCEDURE — 3051F HG A1C>EQUAL 7.0%<8.0%: CPT | Performed by: SURGERY

## 2022-10-10 PROCEDURE — 1123F ACP DISCUSS/DSCN MKR DOCD: CPT | Performed by: SURGERY

## 2022-10-10 PROCEDURE — G8427 DOCREV CUR MEDS BY ELIG CLIN: HCPCS | Performed by: SURGERY

## 2022-10-10 PROCEDURE — 99213 OFFICE O/P EST LOW 20 MIN: CPT | Performed by: SURGERY

## 2022-10-10 PROCEDURE — G8536 NO DOC ELDER MAL SCRN: HCPCS | Performed by: SURGERY

## 2022-10-10 PROCEDURE — 1101F PT FALLS ASSESS-DOCD LE1/YR: CPT | Performed by: SURGERY

## 2022-10-10 RX ORDER — SULFAMETHOXAZOLE AND TRIMETHOPRIM 800; 160 MG/1; MG/1
1 TABLET ORAL 2 TIMES DAILY
COMMUNITY
Start: 2022-09-27

## 2022-10-10 NOTE — PROGRESS NOTES
Identified pt with two pt identifiers (name and ). Reviewed chart in preparation for visit and have obtained necessary documentation. Taylor Rai is a 66 y.o. male  Chief Complaint   Patient presents with    Wound Check     1 week follow up      Visit Vitals  BP (!) 159/77 (BP 1 Location: Left upper arm, BP Patient Position: Lying, BP Cuff Size: Adult)   Pulse 90   Temp 98.4 °F (36.9 °C) (Temporal)   Ht 5' 5\" (1.651 m)   SpO2 99%   BMI 30.79 kg/m²       1. Have you been to the ER, urgent care clinic since your last visit? Hospitalized since your last visit? Unable to answer  2. Have you seen or consulted any other health care providers outside of the [address] [place] [address] since your last visit? Include any pap smears or colon screening. Unable to answer    Patient and provider made aware of elevated BP x2. Patient asymptomatic. Patient reminded to monitor BP, continue to take BP medications if prescribed, and follow up with PCP/Cardiologist.  Patient expressed understanding and agreement.

## 2022-10-13 NOTE — PROGRESS NOTES
VASCULAR FOLLOW UP      Subjective:   CHIEF COMPLAINTS:  Left heel wound  PRESENTATION OF ILLNESS:  Comfort Coats is a 66 y.o. very pleasant gentleman is here today for follow-up. Patient is here with sister. According to sister a lot they have been doing dressing changes as instructed. Past Medical History:   Diagnosis Date    Anemia     BPH (benign prostatic hyperplasia)     Chronic kidney disease     Diabetes ([address]     Hypercholesteremia     Hyperlipidemia     Neurological disorder     Nonspeaking deaf     Schizophrenia [facility])       History reviewed. No pertinent surgical history. Family History   Family history unknown: Yes      Social History     Tobacco Use    Smoking status: Unknown    Smokeless tobacco: Never   Substance Use Topics    Alcohol use: Not Currently       Prior to Admission medications    Medication Sig Start Date End Date Taking? Authorizing Provider   trimethoprim-sulfamethoxazole (BACTRIM DS, SEPTRA DS) 160-800 mg per tablet Take 1 Tablet by mouth two (2) times a day. 9/27/22  Yes Provider, Historical   ascorbic acid, vitamin C, (VITAMIN C) 500 mg tablet Take 500 mg by mouth two (2) times a day. Yes Provider, Historical   traMADoL (ULTRAM) 50 mg tablet Take 50 mg by mouth every six (6) hours as needed for Pain. Yes Provider, Historical   insulin regular (NOVOLIN R, HUMULIN R) 100 unit/mL injection by SubCUTAneous route. Use as sliding scale   Yes Provider, Historical   ferrous sulfate (IRON) 325 mg (65 mg iron) EC tablet Take 325 mg by mouth two (2) times a day. Yes Provider, Historical   docusate sodium (COLACE) 100 mg capsule Take 100 mg by mouth two (2) times a day. Yes Provider, Historical   insulin glargine (LANTUS) 100 unit/mL injection 14 Units by SubCUTAneous route daily. Yes Provider, Historical   melatonin 3 mg tablet Take 3 mg by mouth nightly. Yes Provider, Historical   clopidogreL (PLAVIX) 75 mg tab Take 1 Tablet by mouth daily.  6/22/22  Yes Clinton Inman PA-C   atorvastatin (LIPITOR) 40 mg tablet Take 40 mg by mouth daily. 5/20/22  Yes Provider, Historical   bethanechol (URECHOLINE) 25 mg tablet Take 25 mg by mouth three (3) times daily. 5/14/22  Yes Provider, Historical   dilTIAZem ER (DILACOR XR) 180 mg capsule Take 180 mg by mouth daily. 5/22/22  Yes Provider, Historical   finasteride (PROSCAR) 5 mg tablet Take 5 mg by mouth daily. 5/11/22  Yes Provider, Historical   furosemide (LASIX) 40 mg tablet Take 40 mg by mouth daily. 5/30/22  Yes Provider, Historical   linaGLIPtin (Tradjenta) 5 mg tablet Take 5 mg by mouth daily. Yes Provider, Historical   metFORMIN (GLUCOPHAGE) 500 mg tablet Take 500 mg by mouth two (2) times a day. 3/13/22  Yes Provider, Historical   metoprolol succinate (TOPROL-XL) 50 mg XL tablet Take 20 mg by mouth daily. 3/15/22  Yes Provider, Historical   oxybutynin (DITROPAN) 5 mg tablet Take 5 mg by mouth daily. 5/21/22  Yes Provider, Historical   pantoprazole (PROTONIX) 20 mg tablet Take 20 mg by mouth daily. 4/30/22  Yes Provider, Historical   potassium chloride (K-DUR, KLOR-CON M20) 20 mEq tablet Take 20 mEq by mouth daily. 5/9/22  Yes Provider, Historical   simethicone (GAS-X) 125 mg capsule Take 1 Capsule by mouth three (3) times daily. Yes Provider, Historical   spironolactone (ALDACTONE) 25 mg tablet Take 25 mg by mouth daily. 5/20/22  Yes Provider, Historical   tamsulosin (FLOMAX) 0.4 mg capsule Take 0.4 mg by mouth daily. 5/20/22  Yes Provider, Historical   pregabalin (LYRICA) 100 mg capsule Take 100 mg by mouth two (2) times a day. Yes Provider, Historical   multivitamin (ONE A DAY) tablet Take 1 Tablet by mouth daily. Patient not taking: Reported on 10/10/2022    Provider, Historical   collagenase (SANTYL) 250 unit/gram ointment Apply  to affected area daily. Patient not taking: No sig reported    Provider, Historical   hydrOXYzine pamoate (VISTARIL) 50 mg capsule Take 25 mg by mouth three (3) times daily as needed.   Patient not taking: No sig reported    Provider, Historical   Levemir U-100 Insulin 100 unit/mL injection 14 Units by SubCUTAneous route daily. Patient not taking: No sig reported 5/25/22   Provider, Historical     Allergies   Allergen Reactions    Lisinopril Unknown (comments)     Unknown-pt unable to answer        Review of Systems:  I reviewed the rest of organ systems personally and they were negative signed by Dr. Rocky Ladd    Objective:   Visit Vitals  BP (!) 159/77 (BP 1 Location: Left upper arm, BP Patient Position: Lying, BP Cuff Size: Adult)   Pulse 90   Temp 98.4 °F (36.9 °C) (Temporal)   Ht 5' 5\" (1.651 m)   SpO2 99%   BMI 30.79 kg/m²     VITAL SIGNS REVIEWED. Physical Exam:  Patient is well-nourished pleasant in conversation is appropriate. Head and neck examination atraumatic, normocephalic. Gaze appropriate. Conversation appropriate. Neck examination shows supple. No mass. No obvious carotid bruit. Chest examination shows lungs are clear bilaterally well-expanded, no crackles or wheezes. Cardiovascular system regular rate, no obvious murmur. Skin warm to touch  and moist, no skin lesions. Abdomen is soft ,not tender or distended bowel sounds present. No palpable mass. Neurological examinations, no focal neuro deficits moving all 4 extremities. Cranial nerves intact. Sensation is intact as well. Hematologic: No obvious bruise or swelling or obvious lymphadenopathy. Psychosocial: Appropriate. Has good effect. Musculoskeletal system: No muscle wasting, appropriate movements upper and lower extremity. Vascular examination: I examined the patient's left foot. Most of the wounds are granulating however wound size has not diminished. Data Review:   No visits with results within 1 Month(s) from this visit. Latest known visit with results is:   No results displayed because visit has over 200 results.            Assessment:     Problem List Items Addressed This Visit          Endocrine Ulcer of heel due to diabetes mellitus (HCC) - Primary    Relevant Medications    trimethoprim-sulfamethoxazole (BACTRIM DS, SEPTRA DS) 160-800 mg per tablet       Urinary    Stage 2 chronic kidney disease           Plan:     Continue local wound care with iodine soaks twice daily. And also recommend to continue physical therapy 1 to 3 hours strengthening and range of motion left leg. Patient will be reevaluated in 2 weeks. If wound does not appear progressing possibly considering repeat angiogram on the left leg.         Abigail Franklin MD

## 2022-10-27 ENCOUNTER — OFFICE VISIT (OUTPATIENT)
Dept: SURGERY | Age: 72
End: 2022-10-27
Payer: MEDICARE

## 2022-10-27 VITALS
SYSTOLIC BLOOD PRESSURE: 136 MMHG | OXYGEN SATURATION: 93 % | TEMPERATURE: 98.6 F | HEART RATE: 74 BPM | RESPIRATION RATE: 20 BRPM | DIASTOLIC BLOOD PRESSURE: 66 MMHG

## 2022-10-27 DIAGNOSIS — E13.621: ICD-10-CM

## 2022-10-27 DIAGNOSIS — I73.9 PERIPHERAL VASCULAR DISEASE (HCC): Primary | ICD-10-CM

## 2022-10-27 DIAGNOSIS — L97.406: ICD-10-CM

## 2022-10-27 PROCEDURE — G8417 CALC BMI ABV UP PARAM F/U: HCPCS | Performed by: SURGERY

## 2022-10-27 PROCEDURE — G8432 DEP SCR NOT DOC, RNG: HCPCS | Performed by: SURGERY

## 2022-10-27 PROCEDURE — G8536 NO DOC ELDER MAL SCRN: HCPCS | Performed by: SURGERY

## 2022-10-27 PROCEDURE — 3051F HG A1C>EQUAL 7.0%<8.0%: CPT | Performed by: SURGERY

## 2022-10-27 PROCEDURE — 1101F PT FALLS ASSESS-DOCD LE1/YR: CPT | Performed by: SURGERY

## 2022-10-27 PROCEDURE — G8754 DIAS BP LESS 90: HCPCS | Performed by: SURGERY

## 2022-10-27 PROCEDURE — 3074F SYST BP LT 130 MM HG: CPT | Performed by: SURGERY

## 2022-10-27 PROCEDURE — 99213 OFFICE O/P EST LOW 20 MIN: CPT | Performed by: SURGERY

## 2022-10-27 PROCEDURE — 2022F DILAT RTA XM EVC RTNOPTHY: CPT | Performed by: SURGERY

## 2022-10-27 PROCEDURE — 3017F COLORECTAL CA SCREEN DOC REV: CPT | Performed by: SURGERY

## 2022-10-27 PROCEDURE — 3078F DIAST BP <80 MM HG: CPT | Performed by: SURGERY

## 2022-10-27 PROCEDURE — 1123F ACP DISCUSS/DSCN MKR DOCD: CPT | Performed by: SURGERY

## 2022-10-27 PROCEDURE — G8752 SYS BP LESS 140: HCPCS | Performed by: SURGERY

## 2022-10-27 PROCEDURE — G8427 DOCREV CUR MEDS BY ELIG CLIN: HCPCS | Performed by: SURGERY

## 2022-10-27 NOTE — PROGRESS NOTES
Chief Complaint   Patient presents with    Follow-up     Post op follow up     Visit Vitals  /66 (BP 1 Location: Right upper arm, BP Patient Position: Lying, BP Cuff Size: Adult) Comment: recheck bp   Pulse 74   Temp 98.6 °F (37 °C) (Temporal)   Resp 20   SpO2 93%    1. Have you been to the ER, urgent care clinic since your last visit? Hospitalized since your last visit? No    2. Have you seen or consulted any other health care providers outside of the [address] [place] [address] since your last visit? Include any pap smears or colon screening.  No

## 2022-10-28 NOTE — PROGRESS NOTES
VASCULAR FOLLOW UP      Subjective:   CHIEF COMPLAINTS:  Wound checkup on the left heel  PRESENTATION OF ILLNESS:  Leena Bates is a 66 y.o. very pleasant gentleman currently have a open wound on the left heel and Achilles tendon area. Patient was recommended to have wound care twice daily. Also recommended physical therapy 1 to 3-hour session. Patient comes in from the nursing home. Past Medical History:   Diagnosis Date    Anemia     BPH (benign prostatic hyperplasia)     Chronic kidney disease     Diabetes ([address]     Hypercholesteremia     Hyperlipidemia     Neurological disorder     Nonspeaking deaf     Schizophrenia [facility])       History reviewed. No pertinent surgical history. Family History   Family history unknown: Yes      Social History     Tobacco Use    Smoking status: Unknown    Smokeless tobacco: Never   Substance Use Topics    Alcohol use: Not Currently       Prior to Admission medications    Medication Sig Start Date End Date Taking? Authorizing Provider   ascorbic acid, vitamin C, (VITAMIN C) 500 mg tablet Take 500 mg by mouth two (2) times a day. Yes Provider, Historical   traMADoL (ULTRAM) 50 mg tablet Take 50 mg by mouth every six (6) hours as needed for Pain. Yes Provider, Historical   insulin regular (NOVOLIN R, HUMULIN R) 100 unit/mL injection by SubCUTAneous route. Use as sliding scale   Yes Provider, Historical   ferrous sulfate (IRON) 325 mg (65 mg iron) EC tablet Take 325 mg by mouth two (2) times a day. Yes Provider, Historical   docusate sodium (COLACE) 100 mg capsule Take 100 mg by mouth two (2) times a day. Yes Provider, Historical   melatonin 3 mg tablet Take 3 mg by mouth nightly. Yes Provider, Historical   multivitamin (ONE A DAY) tablet Take 1 Tablet by mouth daily. Yes Provider, Historical   clopidogreL (PLAVIX) 75 mg tab Take 1 Tablet by mouth daily. 6/22/22  Yes Eleonora Moseley PA-C   atorvastatin (LIPITOR) 40 mg tablet Take 40 mg by mouth daily.  5/20/22 Yes Provider, Historical   bethanechol (URECHOLINE) 25 mg tablet Take 25 mg by mouth three (3) times daily. 5/14/22  Yes Provider, Historical   finasteride (PROSCAR) 5 mg tablet Take 5 mg by mouth daily. 5/11/22  Yes Provider, Historical   furosemide (LASIX) 40 mg tablet Take 40 mg by mouth daily. 5/30/22  Yes Provider, Historical   linaGLIPtin (Tradjenta) 5 mg tablet Take 5 mg by mouth daily. Yes Provider, Historical   metFORMIN (GLUCOPHAGE) 500 mg tablet Take 500 mg by mouth two (2) times a day. 3/13/22  Yes Provider, Historical   metoprolol succinate (TOPROL-XL) 50 mg XL tablet Take 20 mg by mouth daily. 3/15/22  Yes Provider, Historical   oxybutynin (DITROPAN) 5 mg tablet Take 5 mg by mouth daily. 5/21/22  Yes Provider, Historical   pantoprazole (PROTONIX) 20 mg tablet Take 20 mg by mouth daily. 4/30/22  Yes Provider, Historical   potassium chloride (K-DUR, KLOR-CON M20) 20 mEq tablet Take 20 mEq by mouth daily. 5/9/22  Yes Provider, Historical   simethicone (GAS-X) 125 mg capsule Take 1 Capsule by mouth three (3) times daily. Yes Provider, Historical   spironolactone (ALDACTONE) 25 mg tablet Take 25 mg by mouth daily. 5/20/22  Yes Provider, Historical   tamsulosin (FLOMAX) 0.4 mg capsule Take 0.4 mg by mouth daily. 5/20/22  Yes Provider, Historical   pregabalin (LYRICA) 100 mg capsule Take 100 mg by mouth two (2) times a day. Yes Provider, Historical   trimethoprim-sulfamethoxazole (BACTRIM DS, SEPTRA DS) 160-800 mg per tablet Take 1 Tablet by mouth two (2) times a day. Patient not taking: Reported on 10/27/2022 9/27/22   Provider, Historical   insulin glargine (LANTUS) 100 unit/mL injection 14 Units by SubCUTAneous route daily. Patient not taking: Reported on 10/27/2022    Provider, Historical   collagenase (SANTYL) 250 unit/gram ointment Apply  to affected area daily.   Patient not taking: No sig reported    Provider, Historical   hydrOXYzine pamoate (VISTARIL) 50 mg capsule Take 25 mg by mouth three (3) times daily as needed. Patient not taking: No sig reported    Provider, Historical   dilTIAZem ER (DILACOR XR) 180 mg capsule Take 180 mg by mouth daily. Patient not taking: Reported on 10/27/2022 5/22/22   Provider, Historical   Levemir U-100 Insulin 100 unit/mL injection 14 Units by SubCUTAneous route daily. Patient not taking: No sig reported 5/25/22   Provider, Historical     Allergies   Allergen Reactions    Lisinopril Unknown (comments)     Unknown-pt unable to answer        Review of Systems:  I reviewed the rest of organ systems personally and they were negative signed by Dr. Festus Kirby    Objective:   Visit Vitals  /66 (BP 1 Location: Right upper arm, BP Patient Position: Lying, BP Cuff Size: Adult) Comment: recheck bp   Pulse 74   Temp 98.6 °F (37 °C) (Temporal)   Resp 20   SpO2 93%     VITAL SIGNS REVIEWED. Physical Exam:  Patient is well-nourished pleasant in conversation is appropriate. Head and neck examination atraumatic, normocephalic. Gaze appropriate. Conversation appropriate. Neck examination shows supple. No mass. No obvious carotid bruit. Chest examination shows lungs are clear bilaterally well-expanded, no crackles or wheezes. Cardiovascular system regular rate, no obvious murmur. Skin warm to touch  and moist, no skin lesions. Abdomen is soft ,not tender or distended bowel sounds present. No palpable mass. Neurological examinations, no focal neuro deficits moving all 4 extremities. Cranial nerves intact. Sensation is intact as well. Hematologic: No obvious bruise or swelling or obvious lymphadenopathy. Psychosocial: Appropriate. Has good effect. Musculoskeletal system: No muscle wasting, appropriate movements upper and lower extremity. Vascular examination: I examined patient's left heel. Wounds are granulating and there is no necrotic tissue. Achilles tendon area wound appears to be smaller.         Data Review:   No visits with results within 1 Month(s) from this visit. Latest known visit with results is:   No results displayed because visit has over 200 results. Assessment:     Problem List Items Addressed This Visit          Circulatory    Peripheral vascular disease ([facility] - Primary       Endocrine    Ulcer of heel due to diabetes mellitus ([facility]           Plan: We will continue wet-to-dry saline gauze with a 4 x 4 and Kerlix roll twice daily. And also continue physical therapy 3-hour sessions daily if is possible for range of motion and strengthening of the left leg. Patient will be reassessed in 2 weeks if the wound does not appear to be smaller will consider dermal substitute placement with  Stravix.         Regina Kwok MD

## 2022-11-01 ENCOUNTER — TELEPHONE (OUTPATIENT)
Dept: SURGERY | Age: 72
End: 2022-11-01

## 2022-11-01 NOTE — TELEPHONE ENCOUNTER
[employer] called wanting another dx code for pt's wound graft. I returned [employer] call and added L97.406. They will let me know if they need any further information. Ph #[phone]. And JCV>[phone]. Thank you.

## 2022-11-21 ENCOUNTER — OFFICE VISIT (OUTPATIENT)
Dept: SURGERY | Age: 72
End: 2022-11-21
Payer: MEDICARE

## 2022-11-21 VITALS
HEART RATE: 80 BPM | DIASTOLIC BLOOD PRESSURE: 65 MMHG | OXYGEN SATURATION: 97 % | TEMPERATURE: 98 F | SYSTOLIC BLOOD PRESSURE: 122 MMHG | RESPIRATION RATE: 22 BRPM

## 2022-11-21 DIAGNOSIS — L97.406: Primary | ICD-10-CM

## 2022-11-21 DIAGNOSIS — E13.621: Primary | ICD-10-CM

## 2022-11-21 PROCEDURE — 3017F COLORECTAL CA SCREEN DOC REV: CPT | Performed by: SURGERY

## 2022-11-21 PROCEDURE — G8752 SYS BP LESS 140: HCPCS | Performed by: SURGERY

## 2022-11-21 PROCEDURE — G8754 DIAS BP LESS 90: HCPCS | Performed by: SURGERY

## 2022-11-21 PROCEDURE — 3074F SYST BP LT 130 MM HG: CPT | Performed by: SURGERY

## 2022-11-21 PROCEDURE — G8417 CALC BMI ABV UP PARAM F/U: HCPCS | Performed by: SURGERY

## 2022-11-21 PROCEDURE — G8510 SCR DEP NEG, NO PLAN REQD: HCPCS | Performed by: SURGERY

## 2022-11-21 PROCEDURE — 1123F ACP DISCUSS/DSCN MKR DOCD: CPT | Performed by: SURGERY

## 2022-11-21 PROCEDURE — 3078F DIAST BP <80 MM HG: CPT | Performed by: SURGERY

## 2022-11-21 PROCEDURE — 1101F PT FALLS ASSESS-DOCD LE1/YR: CPT | Performed by: SURGERY

## 2022-11-21 PROCEDURE — 99213 OFFICE O/P EST LOW 20 MIN: CPT | Performed by: SURGERY

## 2022-11-21 PROCEDURE — 2022F DILAT RTA XM EVC RTNOPTHY: CPT | Performed by: SURGERY

## 2022-11-21 PROCEDURE — G8536 NO DOC ELDER MAL SCRN: HCPCS | Performed by: SURGERY

## 2022-11-21 PROCEDURE — 3051F HG A1C>EQUAL 7.0%<8.0%: CPT | Performed by: SURGERY

## 2022-11-21 PROCEDURE — G8427 DOCREV CUR MEDS BY ELIG CLIN: HCPCS | Performed by: SURGERY

## 2022-11-21 NOTE — PROGRESS NOTES
Chief Complaint   Patient presents with    Follow-up     Check left foot-possible graft placement    Visit Vitals  /65 (BP 1 Location: Left upper arm, BP Patient Position: Lying, BP Cuff Size: Adult)   Pulse 80   Temp 98 °F (36.7 °C) (Temporal)   Resp 22   SpO2 97%    1. Have you been to the ER, urgent care clinic since your last visit? Hospitalized since your last visit? No    2. Have you seen or consulted any other health care providers outside of the [address] [place] [address] since your last visit? Include any pap smears or colon screening.  No

## 2022-11-24 NOTE — PROGRESS NOTES
VASCULAR FOLLOW UP      Subjective:   CHIEF COMPLAINTS:  Left heel wound  PRESENTATION OF ILLNESS:  Sheba Marquis is a 74 y.o. very pleasant gentleman is here today with a sister to check the left heel wound. We are currently doing wet-to-dry dressing on the left heel area. Patient developed a pressure sore. The patient previously had a left leg angioplasty. Past Medical History:   Diagnosis Date    Anemia     BPH (benign prostatic hyperplasia)     Chronic kidney disease     Diabetes ([facility]     Hypercholesteremia     Hyperlipidemia     Neurological disorder     Nonspeaking deaf     Schizophrenia ([facility]       No past surgical history on file. Family History   Family history unknown: Yes      Social History     Tobacco Use    Smoking status: Unknown    Smokeless tobacco: Never   Substance Use Topics    Alcohol use: Not Currently       Prior to Admission medications    Medication Sig Start Date End Date Taking? Authorizing Provider   ascorbic acid, vitamin C, (VITAMIN C) 500 mg tablet Take 500 mg by mouth two (2) times a day. Yes Provider, Historical   traMADoL (ULTRAM) 50 mg tablet Take 50 mg by mouth every six (6) hours as needed for Pain. Yes Provider, Historical   insulin regular (NOVOLIN R, HUMULIN R) 100 unit/mL injection by SubCUTAneous route. Use as sliding scale   Yes Provider, Historical   ferrous sulfate (IRON) 325 mg (65 mg iron) EC tablet Take 325 mg by mouth two (2) times a day. Yes Provider, Historical   docusate sodium (COLACE) 100 mg capsule Take 100 mg by mouth two (2) times a day. Yes Provider, Historical   insulin glargine (LANTUS) 100 unit/mL injection 14 Units by SubCUTAneous route daily. Yes Provider, Historical   melatonin 3 mg tablet Take 3 mg by mouth nightly. Yes Provider, Historical   multivitamin (ONE A DAY) tablet Take 1 Tablet by mouth daily. Yes Provider, Historical   clopidogreL (PLAVIX) 75 mg tab Take 1 Tablet by mouth daily.  6/22/22  Yes Tana Verma PA-C atorvastatin (LIPITOR) 40 mg tablet Take 40 mg by mouth daily. 5/20/22  Yes Provider, Historical   bethanechol (URECHOLINE) 25 mg tablet Take 25 mg by mouth three (3) times daily. 5/14/22  Yes Provider, Historical   dilTIAZem ER (DILACOR XR) 180 mg capsule Take 180 mg by mouth daily. 5/22/22  Yes Provider, Historical   finasteride (PROSCAR) 5 mg tablet Take 5 mg by mouth daily. 5/11/22  Yes Provider, Historical   furosemide (LASIX) 40 mg tablet Take 40 mg by mouth daily. 5/30/22  Yes Provider, Historical   linaGLIPtin (Tradjenta) 5 mg tablet Take 5 mg by mouth daily. Yes Provider, Historical   metFORMIN (GLUCOPHAGE) 500 mg tablet Take 500 mg by mouth two (2) times a day. 3/13/22  Yes Provider, Historical   oxybutynin (DITROPAN) 5 mg tablet Take 5 mg by mouth daily. 5/21/22  Yes Provider, Historical   pantoprazole (PROTONIX) 20 mg tablet Take 20 mg by mouth daily. 4/30/22  Yes Provider, Historical   potassium chloride (K-DUR, KLOR-CON M20) 20 mEq tablet Take 20 mEq by mouth daily. 5/9/22  Yes Provider, Historical   simethicone (GAS-X) 125 mg capsule Take 1 Capsule by mouth three (3) times daily. Yes Provider, Historical   spironolactone (ALDACTONE) 25 mg tablet Take 25 mg by mouth daily. 5/20/22  Yes Provider, Historical   tamsulosin (FLOMAX) 0.4 mg capsule Take 0.4 mg by mouth daily. 5/20/22  Yes Provider, Historical   pregabalin (LYRICA) 100 mg capsule Take 100 mg by mouth two (2) times a day. Yes Provider, Historical   collagenase (SANTYL) 250 unit/gram ointment Apply  to affected area daily. Patient not taking: No sig reported    Provider, Historical   hydrOXYzine pamoate (VISTARIL) 50 mg capsule Take 25 mg by mouth three (3) times daily as needed. Patient not taking: No sig reported    Provider, Historical   Levemir U-100 Insulin 100 unit/mL injection 14 Units by SubCUTAneous route daily.   Patient not taking: No sig reported 5/25/22   Provider, Historical   metoprolol succinate (TOPROL-XL) 50 mg XL tablet Take 20 mg by mouth daily. 3/15/22   Provider, Historical     Allergies   Allergen Reactions    Lisinopril Unknown (comments)     Unknown-pt unable to answer        Review of Systems:  I reviewed the rest of organ systems personally and they were negative signed by Dr. Samia Ventura    Objective:   Visit Vitals  /65 (BP 1 Location: Left upper arm, BP Patient Position: Lying, BP Cuff Size: Adult)   Pulse 80   Temp 98 °F (36.7 °C) (Temporal)   Resp 22   SpO2 97%     VITAL SIGNS REVIEWED. Physical Exam:  Patient is well-nourished pleasant in conversation is appropriate. Head and neck examination atraumatic, normocephalic. Gaze appropriate. Conversation appropriate. Neck examination shows supple. No mass. No obvious carotid bruit. Chest examination shows lungs are clear bilaterally well-expanded, no crackles or wheezes. Cardiovascular system regular rate, no obvious murmur. Skin warm to touch  and moist, no skin lesions. Abdomen is soft ,not tender or distended bowel sounds present. No palpable mass. Neurological examinations, no focal neuro deficits moving all 4 extremities. Cranial nerves intact. Sensation is intact as well. Hematologic: No obvious bruise or swelling or obvious lymphadenopathy. Psychosocial: Appropriate. Has good effect. Musculoskeletal system: No muscle wasting, appropriate movements upper and lower extremity. Vascular examination: I examined the left heel wounds are smaller and granulating there is no drainage. Data Review:   No visits with results within 1 Month(s) from this visit. Latest known visit with results is:   No results displayed because visit has over 200 results. Assessment:     Problem List Items Addressed This Visit          Endocrine    Ulcer of heel due to diabetes mellitus ([facility] [address] - Primary           Plan:     Continue local wound care with wet-to-dry with iodine, continue apply bunny boot.   Also recommend to continue range of motion and strengthening of the left leg with a therapist 1 to 3-hour session daily. Patient will be reassessed in 3 weeks follow-up.         Rik Gilliland MD

## 2022-12-08 ENCOUNTER — OFFICE VISIT (OUTPATIENT)
Dept: SURGERY | Age: 72
End: 2022-12-08
Payer: MEDICARE

## 2022-12-08 VITALS
SYSTOLIC BLOOD PRESSURE: 134 MMHG | DIASTOLIC BLOOD PRESSURE: 72 MMHG | TEMPERATURE: 98.2 F | HEART RATE: 75 BPM | RESPIRATION RATE: 18 BRPM | HEIGHT: 65 IN | OXYGEN SATURATION: 99 % | BODY MASS INDEX: 30.79 KG/M2

## 2022-12-08 DIAGNOSIS — E13.621: Primary | ICD-10-CM

## 2022-12-08 DIAGNOSIS — L97.406: Primary | ICD-10-CM

## 2022-12-08 PROCEDURE — 3074F SYST BP LT 130 MM HG: CPT | Performed by: SURGERY

## 2022-12-08 PROCEDURE — G8427 DOCREV CUR MEDS BY ELIG CLIN: HCPCS | Performed by: SURGERY

## 2022-12-08 PROCEDURE — G8417 CALC BMI ABV UP PARAM F/U: HCPCS | Performed by: SURGERY

## 2022-12-08 PROCEDURE — 2022F DILAT RTA XM EVC RTNOPTHY: CPT | Performed by: SURGERY

## 2022-12-08 PROCEDURE — 1123F ACP DISCUSS/DSCN MKR DOCD: CPT | Performed by: SURGERY

## 2022-12-08 PROCEDURE — G8752 SYS BP LESS 140: HCPCS | Performed by: SURGERY

## 2022-12-08 PROCEDURE — 3017F COLORECTAL CA SCREEN DOC REV: CPT | Performed by: SURGERY

## 2022-12-08 PROCEDURE — G8536 NO DOC ELDER MAL SCRN: HCPCS | Performed by: SURGERY

## 2022-12-08 PROCEDURE — 99213 OFFICE O/P EST LOW 20 MIN: CPT | Performed by: SURGERY

## 2022-12-08 PROCEDURE — 3078F DIAST BP <80 MM HG: CPT | Performed by: SURGERY

## 2022-12-08 PROCEDURE — G8432 DEP SCR NOT DOC, RNG: HCPCS | Performed by: SURGERY

## 2022-12-08 PROCEDURE — G8754 DIAS BP LESS 90: HCPCS | Performed by: SURGERY

## 2022-12-08 PROCEDURE — 1101F PT FALLS ASSESS-DOCD LE1/YR: CPT | Performed by: SURGERY

## 2022-12-08 PROCEDURE — 3051F HG A1C>EQUAL 7.0%<8.0%: CPT | Performed by: SURGERY

## 2022-12-08 RX ORDER — METOPROLOL TARTRATE 25 MG/1
1 TABLET, FILM COATED ORAL DAILY
COMMUNITY
Start: 2022-11-16

## 2022-12-08 NOTE — PROGRESS NOTES
Identified pt with two pt identifiers (name and ). Reviewed chart in preparation for visit and have obtained necessary documentation. Melania Abarca is a 74 y.o. male  Chief Complaint   Patient presents with    Follow-up     Left heel wound check      Visit Vitals  /72 (BP 1 Location: Left upper arm, BP Patient Position: Sitting)   Pulse 75   Temp 98.2 °F (36.8 °C) (Temporal)   Resp 18   Ht 5' 5\" (1.651 m)   SpO2 99%   BMI 30.79 kg/m²       1. Have you been to the ER, urgent care clinic since your last visit? Hospitalized since your last visit? No    2. Have you seen or consulted any other health care providers outside of the [address] [place] [address] since your last visit? Include any pap smears or colon screening.  No

## 2022-12-12 NOTE — PROGRESS NOTES
VASCULAR FOLLOW UP      Subjective:   CHIEF COMPLAINTS: Left heel wound  PRESENTATION OF ILLNESS:  Nicholas Mendenhall is a 74 y.o. very pleasant gentleman is here today with his sister examined the left heel wound. According to her they had been doing wound care for some time, and wound care is not done. And apparently he is not getting physical therapy on the left leg range of motion and strengthening. Past Medical History:   Diagnosis Date    Anemia     BPH (benign prostatic hyperplasia)     Chronic kidney disease     Diabetes ([facility] [address]     Hypercholesteremia     Hyperlipidemia     Neurological disorder     Nonspeaking deaf     Schizophrenia ([facility] [address]       No past surgical history on file. Family History   Family history unknown: Yes      Social History     Tobacco Use    Smoking status: Unknown    Smokeless tobacco: Never   Substance Use Topics    Alcohol use: Not Currently       Prior to Admission medications    Medication Sig Start Date End Date Taking? Authorizing Provider   metoprolol tartrate (LOPRESSOR) 25 mg tablet Take 1 Tablet by mouth daily. 11/16/22  Yes Provider, Historical   ascorbic acid, vitamin C, (VITAMIN C) 500 mg tablet Take 500 mg by mouth two (2) times a day. Yes Provider, Historical   traMADoL (ULTRAM) 50 mg tablet Take 50 mg by mouth every six (6) hours as needed for Pain. Yes Provider, Historical   insulin regular (NOVOLIN R, HUMULIN R) 100 unit/mL injection by SubCUTAneous route. Use as sliding scale   Yes Provider, Historical   ferrous sulfate (IRON) 325 mg (65 mg iron) EC tablet Take 325 mg by mouth two (2) times a day. Yes Provider, Historical   docusate sodium (COLACE) 100 mg capsule Take 100 mg by mouth two (2) times a day. Yes Provider, Historical   insulin glargine (LANTUS) 100 unit/mL injection 14 Units by SubCUTAneous route daily. Yes Provider, Historical   melatonin 3 mg tablet Take 3 mg by mouth nightly.    Yes Provider, Historical   multivitamin (ONE A DAY) tablet Take 1 Tablet by mouth daily. Yes Provider, Historical   hydrOXYzine pamoate (VISTARIL) 50 mg capsule Take 25 mg by mouth three (3) times daily as needed. Yes Provider, Historical   clopidogreL (PLAVIX) 75 mg tab Take 1 Tablet by mouth daily. 6/22/22  Yes ZionerChanda PA-C   atorvastatin (LIPITOR) 40 mg tablet Take 40 mg by mouth daily. 5/20/22  Yes Provider, Historical   bethanechol (URECHOLINE) 25 mg tablet Take 25 mg by mouth three (3) times daily. 5/14/22  Yes Provider, Historical   dilTIAZem ER (DILACOR XR) 180 mg capsule Take 180 mg by mouth daily. 5/22/22  Yes Provider, Historical   finasteride (PROSCAR) 5 mg tablet Take 5 mg by mouth daily. 5/11/22  Yes Provider, Historical   furosemide (LASIX) 40 mg tablet Take 40 mg by mouth daily. 5/30/22  Yes Provider, Historical   Levemir U-100 Insulin 100 unit/mL injection 14 Units by SubCUTAneous route daily. 5/25/22  Yes Provider, Historical   linaGLIPtin (Tradjenta) 5 mg tablet Take 5 mg by mouth daily. Yes Provider, Historical   metFORMIN (GLUCOPHAGE) 500 mg tablet Take 500 mg by mouth two (2) times a day. 3/13/22  Yes Provider, Historical   oxybutynin (DITROPAN) 5 mg tablet Take 5 mg by mouth daily. 5/21/22  Yes Provider, Historical   pantoprazole (PROTONIX) 20 mg tablet Take 20 mg by mouth daily. 4/30/22  Yes Provider, Historical   potassium chloride (K-DUR, KLOR-CON M20) 20 mEq tablet Take 20 mEq by mouth daily. 5/9/22  Yes Provider, Historical   simethicone (GAS-X) 125 mg capsule Take 1 Capsule by mouth three (3) times daily. Yes Provider, Historical   spironolactone (ALDACTONE) 25 mg tablet Take 25 mg by mouth daily. 5/20/22  Yes Provider, Historical   tamsulosin (FLOMAX) 0.4 mg capsule Take 0.4 mg by mouth daily. 5/20/22  Yes Provider, Historical   pregabalin (LYRICA) 100 mg capsule Take 100 mg by mouth two (2) times a day. Yes Provider, Historical   collagenase (SANTYL) 250 unit/gram ointment Apply  to affected area daily.   Patient not taking: No sig reported    Provider, Historical   metoprolol succinate (TOPROL-XL) 50 mg XL tablet Take 20 mg by mouth daily. Patient not taking: Reported on 12/8/2022 3/15/22   Provider, Historical     Allergies   Allergen Reactions    Lisinopril Unknown (comments)     Unknown-pt unable to answer        Review of Systems:  I reviewed the rest of organ systems personally and they were negative signed by Dr. Noah Nur    Objective:   Visit Vitals  /72 (BP 1 Location: Left upper arm, BP Patient Position: Sitting)   Pulse 75   Temp 98.2 °F (36.8 °C) (Temporal)   Resp 18   Ht 5' 5\" (1.651 m)   SpO2 99%   BMI 30.79 kg/m²     VITAL SIGNS REVIEWED. Physical Exam:  Patient is well-nourished pleasant in conversation is appropriate. Head and neck examination atraumatic, normocephalic. Gaze appropriate. Conversation appropriate. Neck examination shows supple. No mass. No obvious carotid bruit. Chest examination shows lungs are clear bilaterally well-expanded, no crackles or wheezes. Cardiovascular system regular rate, no obvious murmur. Skin warm to touch  and moist, no skin lesions. Abdomen is soft ,not tender or distended bowel sounds present. No palpable mass. Neurological examinations, no focal neuro deficits moving all 4 extremities. Cranial nerves intact. Sensation is intact as well. Hematologic: No obvious bruise or swelling or obvious lymphadenopathy. Psychosocial: Appropriate. Has good effect. Musculoskeletal system: No muscle wasting, appropriate movements upper and lower extremity. Vascular examination: I examined the left heel wounds actually closed 90% very pink and granulating. Data Review:   No visits with results within 1 Month(s) from this visit. Latest known visit with results is:   No results displayed because visit has over 200 results.            Assessment:     Problem List Items Addressed This Visit          Endocrine    Ulcer of heel due to diabetes mellitus ([facility] [address] - Primary Relevant Medications    metoprolol tartrate (LOPRESSOR) 25 mg tablet           Plan:     Patient was advised from now on we will cannot do Aquacel Ag and continue bunny boot. And I recommend continued physical therapy of the left leg strengthening range of motion 1 to 3 hours daily. And I will reassess him again 1 month follow-up.         Ezio Crawley MD

## 2023-01-12 ENCOUNTER — OFFICE VISIT (OUTPATIENT)
Dept: SURGERY | Age: 73
End: 2023-01-12
Payer: MEDICARE

## 2023-01-12 VITALS
DIASTOLIC BLOOD PRESSURE: 65 MMHG | TEMPERATURE: 97.1 F | SYSTOLIC BLOOD PRESSURE: 138 MMHG | HEART RATE: 97 BPM | OXYGEN SATURATION: 99 %

## 2023-01-12 DIAGNOSIS — I73.9 PERIPHERAL VASCULAR DISEASE (HCC): Primary | ICD-10-CM

## 2023-01-12 DIAGNOSIS — L97.406: ICD-10-CM

## 2023-01-12 DIAGNOSIS — E13.621: ICD-10-CM

## 2023-01-12 PROCEDURE — 3078F DIAST BP <80 MM HG: CPT | Performed by: SURGERY

## 2023-01-12 PROCEDURE — 1101F PT FALLS ASSESS-DOCD LE1/YR: CPT | Performed by: SURGERY

## 2023-01-12 PROCEDURE — G8432 DEP SCR NOT DOC, RNG: HCPCS | Performed by: SURGERY

## 2023-01-12 PROCEDURE — 99213 OFFICE O/P EST LOW 20 MIN: CPT | Performed by: SURGERY

## 2023-01-12 PROCEDURE — G8417 CALC BMI ABV UP PARAM F/U: HCPCS | Performed by: SURGERY

## 2023-01-12 PROCEDURE — 3074F SYST BP LT 130 MM HG: CPT | Performed by: SURGERY

## 2023-01-12 PROCEDURE — G8427 DOCREV CUR MEDS BY ELIG CLIN: HCPCS | Performed by: SURGERY

## 2023-01-12 PROCEDURE — 3046F HEMOGLOBIN A1C LEVEL >9.0%: CPT | Performed by: SURGERY

## 2023-01-12 PROCEDURE — G8536 NO DOC ELDER MAL SCRN: HCPCS | Performed by: SURGERY

## 2023-01-12 PROCEDURE — 2022F DILAT RTA XM EVC RTNOPTHY: CPT | Performed by: SURGERY

## 2023-01-12 PROCEDURE — 1123F ACP DISCUSS/DSCN MKR DOCD: CPT | Performed by: SURGERY

## 2023-01-12 PROCEDURE — 3017F COLORECTAL CA SCREEN DOC REV: CPT | Performed by: SURGERY

## 2023-01-12 NOTE — PROGRESS NOTES
Identified pt with two pt identifiers (name and ). Reviewed chart in preparation for visit and have obtained necessary documentation. Kassidy Garcia is a 74 y.o. male  Chief Complaint   Patient presents with    Follow-up     1m follow up-Diabetic ulcer of heel     Visit Vitals  /65 (BP 1 Location: Left upper arm, BP Patient Position: Sitting, BP Cuff Size: Large adult)   Pulse 97   Temp 97.1 °F (36.2 °C) (Temporal)   SpO2 99%       1. Have you been to the ER, urgent care clinic since your last visit? Hospitalized since your last visit? No    2. Have you seen or consulted any other health care providers outside of the [address] [place] [address] since your last visit? Include any pap smears or colon screening.   no

## 2023-01-16 NOTE — PROGRESS NOTES
VASCULAR FOLLOW UP      Subjective:   CHIEF COMPLAINTS:  Left heel ulcer  PRESENTATION OF ILLNESS:  Raghav Howe is a 74 y.o. very pleasant gentleman is here today for follow-up. Patient currently nursing home. Wound care orders are specifically involving Aquacel Ag and a protective weight on the heel area including bunny boot, also requested to participate in physical therapy and strengthening bilateral leg 1 to 3 hours. Past Medical History:   Diagnosis Date    Anemia     BPH (benign prostatic hyperplasia)     Chronic kidney disease     Diabetes ([address]     Hypercholesteremia     Hyperlipidemia     Neurological disorder     Nonspeaking deaf     Schizophrenia [facility])       History reviewed. No pertinent surgical history. Family History   Family history unknown: Yes      Social History     Tobacco Use    Smoking status: Unknown    Smokeless tobacco: Never   Substance Use Topics    Alcohol use: Not Currently       Prior to Admission medications    Medication Sig Start Date End Date Taking? Authorizing Provider   metoprolol tartrate (LOPRESSOR) 25 mg tablet Take 1 Tablet by mouth daily. 11/16/22  Yes Provider, Historical   ascorbic acid, vitamin C, (VITAMIN C) 500 mg tablet Take 500 mg by mouth two (2) times a day. Yes Provider, Historical   traMADoL (ULTRAM) 50 mg tablet Take 50 mg by mouth every six (6) hours as needed for Pain. Yes Provider, Historical   insulin regular (NOVOLIN R, HUMULIN R) 100 unit/mL injection by SubCUTAneous route. Use as sliding scale   Yes Provider, Historical   ferrous sulfate (IRON) 325 mg (65 mg iron) EC tablet Take 325 mg by mouth two (2) times a day. Yes Provider, Historical   docusate sodium (COLACE) 100 mg capsule Take 100 mg by mouth two (2) times a day. Yes Provider, Historical   insulin glargine (LANTUS) 100 unit/mL injection 14 Units by SubCUTAneous route daily. Yes Provider, Historical   melatonin 3 mg tablet Take 3 mg by mouth nightly.    Yes Provider, Historical multivitamin (ONE A DAY) tablet Take 1 Tablet by mouth daily. Yes Provider, Historical   clopidogreL (PLAVIX) 75 mg tab Take 1 Tablet by mouth daily. 6/22/22  Yes Reasoner, Lisa Caraballo PA-C   atorvastatin (LIPITOR) 40 mg tablet Take 40 mg by mouth daily. 5/20/22  Yes Provider, Historical   bethanechol (URECHOLINE) 25 mg tablet Take 25 mg by mouth three (3) times daily. 5/14/22  Yes Provider, Historical   dilTIAZem ER (DILACOR XR) 180 mg capsule Take 180 mg by mouth daily. 5/22/22  Yes Provider, Historical   finasteride (PROSCAR) 5 mg tablet Take 5 mg by mouth daily. 5/11/22  Yes Provider, Historical   furosemide (LASIX) 40 mg tablet Take 40 mg by mouth daily. 5/30/22  Yes Provider, Historical   Levemir U-100 Insulin 100 unit/mL injection 14 Units by SubCUTAneous route daily. 5/25/22  Yes Provider, Historical   linaGLIPtin (Tradjenta) 5 mg tablet Take 5 mg by mouth daily. Yes Provider, Historical   metFORMIN (GLUCOPHAGE) 500 mg tablet Take 500 mg by mouth two (2) times a day. 3/13/22  Yes Provider, Historical   oxybutynin (DITROPAN) 5 mg tablet Take 5 mg by mouth daily. 5/21/22  Yes Provider, Historical   pantoprazole (PROTONIX) 20 mg tablet Take 20 mg by mouth daily. 4/30/22  Yes Provider, Historical   potassium chloride (K-DUR, KLOR-CON M20) 20 mEq tablet Take 20 mEq by mouth daily. 5/9/22  Yes Provider, Historical   simethicone (GAS-X) 125 mg capsule Take 1 Capsule by mouth three (3) times daily. Yes Provider, Historical   spironolactone (ALDACTONE) 25 mg tablet Take 25 mg by mouth daily. 5/20/22  Yes Provider, Historical   tamsulosin (FLOMAX) 0.4 mg capsule Take 0.4 mg by mouth daily. 5/20/22  Yes Provider, Historical   pregabalin (LYRICA) 100 mg capsule Take 100 mg by mouth two (2) times a day. Yes Provider, Historical   collagenase (SANTYL) 250 unit/gram ointment Apply  to affected area daily.   Patient not taking: No sig reported    Provider, Historical   hydrOXYzine pamoate (VISTARIL) 50 mg capsule Take 25 mg by mouth three (3) times daily as needed. Patient not taking: Reported on 1/12/2023    Provider, Historical   metoprolol succinate (TOPROL-XL) 50 mg XL tablet Take 20 mg by mouth daily. Patient not taking: No sig reported 3/15/22   Provider, Historical     Allergies   Allergen Reactions    Lisinopril Unknown (comments)     Unknown-pt unable to answer        Review of Systems:  I reviewed the rest of organ systems personally and they were negative signed by Dr. Hong Dent    Objective:   Visit Vitals  /65 (BP 1 Location: Left upper arm, BP Patient Position: Sitting, BP Cuff Size: Large adult)   Pulse 97   Temp 97.1 °F (36.2 °C) (Temporal)   SpO2 99%     VITAL SIGNS REVIEWED. Physical Exam:  Patient is well-nourished pleasant in conversation is appropriate. Head and neck examination atraumatic, normocephalic. Gaze appropriate. Conversation appropriate. Neck examination shows supple. No mass. No obvious carotid bruit. Chest examination shows lungs are clear bilaterally well-expanded, no crackles or wheezes. Cardiovascular system regular rate, no obvious murmur. Skin warm to touch  and moist, no skin lesions. Abdomen is soft ,not tender or distended bowel sounds present. No palpable mass. Neurological examinations, no focal neuro deficits moving all 4 extremities. Cranial nerves intact. Sensation is intact as well. Hematologic: No obvious bruise or swelling or obvious lymphadenopathy. Psychosocial: Appropriate. Has good effect. Musculoskeletal system: No muscle wasting, appropriate movements upper and lower extremity. Vascular examination: Doppler interrogation has no changes. Left heel ulcer appears much better and 99% closed. I applied area with Aquacel Ag and a Mepilex. Data Review:   No visits with results within 1 Month(s) from this visit. Latest known visit with results is:   No results displayed because visit has over 200 results.            Assessment:     Problem List Items Addressed This Visit          Circulatory    Peripheral vascular disease ([facility] [address] - Primary       Endocrine    Ulcer of heel due to diabetes mellitus ([facility] [address]           Plan:     Patient and family was informed about continue wound care with Aquacel Ag and protective barrier such as Mepilex and continue bunny boots. Patient will be reassessed 1 month follow-up.         Alex Loco MD

## 2023-02-13 ENCOUNTER — OFFICE VISIT (OUTPATIENT)
Dept: SURGERY | Age: 73
End: 2023-02-13
Payer: MEDICARE

## 2023-02-13 VITALS
OXYGEN SATURATION: 99 % | DIASTOLIC BLOOD PRESSURE: 87 MMHG | TEMPERATURE: 97.8 F | HEART RATE: 86 BPM | SYSTOLIC BLOOD PRESSURE: 136 MMHG

## 2023-02-13 DIAGNOSIS — E13.621: Primary | ICD-10-CM

## 2023-02-13 DIAGNOSIS — L97.406: Primary | ICD-10-CM

## 2023-02-13 PROCEDURE — 3079F DIAST BP 80-89 MM HG: CPT | Performed by: SURGERY

## 2023-02-13 PROCEDURE — 2022F DILAT RTA XM EVC RTNOPTHY: CPT | Performed by: SURGERY

## 2023-02-13 PROCEDURE — G8510 SCR DEP NEG, NO PLAN REQD: HCPCS | Performed by: SURGERY

## 2023-02-13 PROCEDURE — 99213 OFFICE O/P EST LOW 20 MIN: CPT | Performed by: SURGERY

## 2023-02-13 PROCEDURE — G8536 NO DOC ELDER MAL SCRN: HCPCS | Performed by: SURGERY

## 2023-02-13 PROCEDURE — 3075F SYST BP GE 130 - 139MM HG: CPT | Performed by: SURGERY

## 2023-02-13 PROCEDURE — G8427 DOCREV CUR MEDS BY ELIG CLIN: HCPCS | Performed by: SURGERY

## 2023-02-13 PROCEDURE — 3017F COLORECTAL CA SCREEN DOC REV: CPT | Performed by: SURGERY

## 2023-02-13 PROCEDURE — 1101F PT FALLS ASSESS-DOCD LE1/YR: CPT | Performed by: SURGERY

## 2023-02-13 PROCEDURE — 1123F ACP DISCUSS/DSCN MKR DOCD: CPT | Performed by: SURGERY

## 2023-02-13 PROCEDURE — 3046F HEMOGLOBIN A1C LEVEL >9.0%: CPT | Performed by: SURGERY

## 2023-02-13 PROCEDURE — G8417 CALC BMI ABV UP PARAM F/U: HCPCS | Performed by: SURGERY

## 2023-02-13 NOTE — PROGRESS NOTES
VASCULAR FOLLOW UP      Subjective:   CHIEF COMPLAINTS:  Left heel ulcer  PRESENTATION OF ILLNESS:  Zenaida Yuan is a 74 y.o. very pleasant gentleman is here today follow-up to check left heel ulcer. Patient doing well she is minimally active. Patient currently have a colostomy as well. Past Medical History:   Diagnosis Date    Anemia     BPH (benign prostatic hyperplasia)     Chronic kidney disease     Diabetes ([address]     Hypercholesteremia     Hyperlipidemia     Neurological disorder     Nonspeaking deaf     Schizophrenia [facility])       History reviewed. No pertinent surgical history. Family History   Family history unknown: Yes      Social History     Tobacco Use    Smoking status: Unknown    Smokeless tobacco: Never   Substance Use Topics    Alcohol use: Not Currently       Prior to Admission medications    Medication Sig Start Date End Date Taking? Authorizing Provider   metoprolol tartrate (LOPRESSOR) 25 mg tablet Take 1 Tablet by mouth daily. 11/16/22  Yes Provider, Historical   ascorbic acid, vitamin C, (VITAMIN C) 500 mg tablet Take 500 mg by mouth two (2) times a day. Yes Provider, Historical   traMADoL (ULTRAM) 50 mg tablet Take 50 mg by mouth every six (6) hours as needed for Pain. Yes Provider, Historical   insulin regular (NOVOLIN R, HUMULIN R) 100 unit/mL injection by SubCUTAneous route. Use as sliding scale   Yes Provider, Historical   ferrous sulfate (IRON) 325 mg (65 mg iron) EC tablet Take 325 mg by mouth two (2) times a day. Yes Provider, Historical   docusate sodium (COLACE) 100 mg capsule Take 100 mg by mouth two (2) times a day. Yes Provider, Historical   insulin glargine (LANTUS) 100 unit/mL injection 14 Units by SubCUTAneous route daily. Yes Provider, Historical   melatonin 3 mg tablet Take 3 mg by mouth nightly. Yes Provider, Historical   multivitamin (ONE A DAY) tablet Take 1 Tablet by mouth daily.    Yes Provider, Historical   clopidogreL (PLAVIX) 75 mg tab Take 1 Tablet by mouth daily. 6/22/22  Yes Reasoner, Valerie Dior PA-C   atorvastatin (LIPITOR) 40 mg tablet Take 40 mg by mouth daily. 5/20/22  Yes Provider, Historical   bethanechol (URECHOLINE) 25 mg tablet Take 25 mg by mouth three (3) times daily. 5/14/22  Yes Provider, Historical   dilTIAZem ER (DILACOR XR) 180 mg capsule Take 180 mg by mouth daily. 5/22/22  Yes Provider, Historical   finasteride (PROSCAR) 5 mg tablet Take 5 mg by mouth daily. 5/11/22  Yes Provider, Historical   furosemide (LASIX) 40 mg tablet Take 40 mg by mouth daily. 5/30/22  Yes Provider, Historical   Levemir U-100 Insulin 100 unit/mL injection 14 Units by SubCUTAneous route daily. 5/25/22  Yes Provider, Historical   linaGLIPtin (Tradjenta) 5 mg tablet Take 5 mg by mouth daily. Yes Provider, Historical   metFORMIN (GLUCOPHAGE) 500 mg tablet Take 500 mg by mouth two (2) times a day. 3/13/22  Yes Provider, Historical   oxybutynin (DITROPAN) 5 mg tablet Take 5 mg by mouth daily. 5/21/22  Yes Provider, Historical   pantoprazole (PROTONIX) 20 mg tablet Take 20 mg by mouth daily. 4/30/22  Yes Provider, Historical   potassium chloride (K-DUR, KLOR-CON M20) 20 mEq tablet Take 20 mEq by mouth daily. 5/9/22  Yes Provider, Historical   simethicone (GAS-X) 125 mg capsule Take 1 Capsule by mouth three (3) times daily. Yes Provider, Historical   spironolactone (ALDACTONE) 25 mg tablet Take 25 mg by mouth daily. 5/20/22  Yes Provider, Historical   tamsulosin (FLOMAX) 0.4 mg capsule Take 0.4 mg by mouth daily. 5/20/22  Yes Provider, Historical   pregabalin (LYRICA) 100 mg capsule Take 100 mg by mouth two (2) times a day. Yes Provider, Historical   collagenase (SANTYL) 250 unit/gram ointment Apply  to affected area daily. Patient not taking: No sig reported    Provider, Historical   hydrOXYzine pamoate (VISTARIL) 50 mg capsule Take 25 mg by mouth three (3) times daily as needed.   Patient not taking: No sig reported    Provider, Historical   metoprolol succinate (TOPROL-XL) 50 mg XL tablet Take 20 mg by mouth daily. Patient not taking: No sig reported 3/15/22   Provider, Historical     Allergies   Allergen Reactions    Lisinopril Unknown (comments)     Unknown-pt unable to answer        Review of Systems:  I reviewed the rest of organ systems personally and they were negative signed by Dr. Lucrecia Royal    Objective:   Visit Vitals  /87 (BP 1 Location: Right upper arm, BP Patient Position: Lying, BP Cuff Size: Large adult)   Pulse 86   Temp 97.8 °F (36.6 °C) (Temporal)   SpO2 99%     VITAL SIGNS REVIEWED. Physical Exam:  Patient is well-nourished pleasant in conversation is appropriate. Head and neck examination atraumatic, normocephalic. Gaze appropriate. Conversation appropriate. Neck examination shows supple. No mass. No obvious carotid bruit. Chest examination shows lungs are clear bilaterally well-expanded, no crackles or wheezes. Cardiovascular system regular rate, no obvious murmur. Skin warm to touch  and moist, no skin lesions. Abdomen is soft ,not tender or distended bowel sounds present. No palpable mass. Neurological examinations, no focal neuro deficits moving all 4 extremities. Cranial nerves intact. Sensation is intact as well. Hematologic: No obvious bruise or swelling or obvious lymphadenopathy. Psychosocial: Appropriate. Has good effect. Musculoskeletal system: No muscle wasting, appropriate movements upper and lower extremity. Vascular examination: Left heel wound has completely closed. Excellent biphasic signal noted. Data Review:   No visits with results within 1 Month(s) from this visit. Latest known visit with results is:   No results displayed because visit has over 200 results.            Assessment:     Problem List Items Addressed This Visit          Endocrine    Ulcer of heel due to diabetes mellitus ([facility] [address] - Primary           Plan:     Patient will be reassessed in 6 months for possible colostomy reversal if patient is more active. If he is not actively involved with activity, probably continue colostomy.         Mino Egan MD

## 2023-02-13 NOTE — PROGRESS NOTES
Identified pt with two pt identifiers (name and ). Reviewed chart in preparation for visit and have obtained necessary documentation. Haley Rubio is a 74 y.o. male  Chief Complaint   Patient presents with    Follow-up     1m follow up-Diabetic ulcer of heel     Visit Vitals  /87 (BP 1 Location: Right upper arm, BP Patient Position: Lying, BP Cuff Size: Large adult)   Pulse 86   Temp 97.8 °F (36.6 °C) (Temporal)   SpO2 99%       1. Have you been to the ER, urgent care clinic since your last visit? Hospitalized since your last visit? No    2. Have you seen or consulted any other health care providers outside of the [address] [place] [address] since your last visit? Include any pap smears or colon screening.  No

## 2023-05-11 ENCOUNTER — APPOINTMENT (OUTPATIENT)
Facility: HOSPITAL | Age: 73
DRG: 682 | End: 2023-05-11
Payer: MEDICARE

## 2023-05-11 ENCOUNTER — HOSPITAL ENCOUNTER (INPATIENT)
Facility: HOSPITAL | Age: 73
LOS: 3 days | Discharge: NURSING, REHAB OR LONG-TERM CARE | DRG: 682 | End: 2023-05-14
Attending: STUDENT IN AN ORGANIZED HEALTH CARE EDUCATION/TRAINING PROGRAM | Admitting: FAMILY MEDICINE
Payer: MEDICARE

## 2023-05-11 DIAGNOSIS — R73.9 HYPERGLYCEMIA: Primary | ICD-10-CM

## 2023-05-11 DIAGNOSIS — N17.9 ACUTE RENAL INJURY (HCC): ICD-10-CM

## 2023-05-11 PROBLEM — R41.82 AMS (ALTERED MENTAL STATUS): Status: ACTIVE | Noted: 2023-05-11

## 2023-05-11 PROBLEM — E86.0 DEHYDRATION: Status: ACTIVE | Noted: 2023-05-11

## 2023-05-11 LAB
ALBUMIN SERPL-MCNC: 3.8 G/DL (ref 3.5–5)
ALBUMIN/GLOB SERPL: 0.8 (ref 1.1–2.2)
ALP SERPL-CCNC: 154 U/L (ref 45–117)
ALT SERPL-CCNC: 25 U/L (ref 12–78)
ANION GAP SERPL CALC-SCNC: 7 MMOL/L (ref 5–15)
AST SERPL W P-5'-P-CCNC: 8 U/L (ref 15–37)
BASE DEFICIT BLDV-SCNC: 2.7 MMOL/L
BASOPHILS # BLD: 0 K/UL (ref 0–0.1)
BASOPHILS NFR BLD: 0 % (ref 0–1)
BILIRUB SERPL-MCNC: 0.3 MG/DL (ref 0.2–1)
BUN SERPL-MCNC: 45 MG/DL (ref 6–20)
BUN/CREAT SERPL: 24 (ref 12–20)
CA-I BLD-MCNC: 9.8 MG/DL (ref 8.5–10.1)
CHLORIDE SERPL-SCNC: 117 MMOL/L (ref 97–108)
CO2 SERPL-SCNC: 22 MMOL/L (ref 21–32)
CREAT SERPL-MCNC: 1.84 MG/DL (ref 0.7–1.3)
DIFFERENTIAL METHOD BLD: ABNORMAL
EOSINOPHIL # BLD: 0 K/UL (ref 0–0.4)
EOSINOPHIL NFR BLD: 0 % (ref 0–7)
ERYTHROCYTE [DISTWIDTH] IN BLOOD BY AUTOMATED COUNT: 13.3 % (ref 11.5–14.5)
GLOBULIN SER CALC-MCNC: 5 G/DL (ref 2–4)
GLUCOSE BLD STRIP.AUTO-MCNC: 409 MG/DL (ref 65–100)
GLUCOSE BLD STRIP.AUTO-MCNC: 438 MG/DL (ref 65–100)
GLUCOSE BLD STRIP.AUTO-MCNC: 438 MG/DL (ref 65–100)
GLUCOSE BLD STRIP.AUTO-MCNC: 542 MG/DL (ref 65–100)
GLUCOSE BLD STRIP.AUTO-MCNC: >600 MG/DL (ref 65–100)
GLUCOSE SERPL-MCNC: 730 MG/DL (ref 65–100)
HCO3 BLDV-SCNC: 22.2 MMOL/L (ref 22–26)
HCT VFR BLD AUTO: 41.5 % (ref 36.6–50.3)
HGB BLD-MCNC: 13.2 G/DL (ref 12.1–17)
IMM GRANULOCYTES # BLD AUTO: 0.1 K/UL (ref 0–0.04)
IMM GRANULOCYTES NFR BLD AUTO: 1 % (ref 0–0.5)
LACTATE SERPL-SCNC: 1.4 MMOL/L (ref 0.4–2)
LYMPHOCYTES # BLD: 1.4 K/UL (ref 0.8–3.5)
LYMPHOCYTES NFR BLD: 10 % (ref 12–49)
MCH RBC QN AUTO: 30.1 PG (ref 26–34)
MCHC RBC AUTO-ENTMCNC: 31.8 G/DL (ref 30–36.5)
MCV RBC AUTO: 94.7 FL (ref 80–99)
MONOCYTES # BLD: 1 K/UL (ref 0–1)
MONOCYTES NFR BLD: 8 % (ref 5–13)
NEUTS SEG # BLD: 10.8 K/UL (ref 1.8–8)
NEUTS SEG NFR BLD: 81 % (ref 32–75)
NRBC # BLD: 0 K/UL (ref 0–0.01)
NRBC BLD-RTO: 0 PER 100 WBC
PCO2 BLDV: 38.4 MMHG (ref 41–52)
PERFORMED BY:: ABNORMAL
PH BLDV: 7.37 (ref 7.23–7.44)
PLATELET # BLD AUTO: 182 K/UL (ref 150–400)
PMV BLD AUTO: 12.7 FL (ref 8.9–12.9)
PO2 BLDV: 50 MMHG (ref 25–40)
POTASSIUM SERPL-SCNC: 4.9 MMOL/L (ref 3.5–5.1)
PROCALCITONIN SERPL-MCNC: 0.23 NG/ML
PROT SERPL-MCNC: 8.8 G/DL (ref 6.4–8.2)
RBC # BLD AUTO: 4.38 M/UL (ref 4.1–5.7)
SAO2 % BLDV: 84.4 %
SODIUM SERPL-SCNC: 146 MMOL/L (ref 136–145)
SPECIMEN TYPE: ABNORMAL
TROPONIN I SERPL HS-MCNC: 10 NG/L (ref 0–76)
WBC # BLD AUTO: 13.3 K/UL (ref 4.1–11.1)

## 2023-05-11 PROCEDURE — 84145 PROCALCITONIN (PCT): CPT

## 2023-05-11 PROCEDURE — 71045 X-RAY EXAM CHEST 1 VIEW: CPT

## 2023-05-11 PROCEDURE — 70450 CT HEAD/BRAIN W/O DYE: CPT

## 2023-05-11 PROCEDURE — 6370000000 HC RX 637 (ALT 250 FOR IP): Performed by: FAMILY MEDICINE

## 2023-05-11 PROCEDURE — 87040 BLOOD CULTURE FOR BACTERIA: CPT

## 2023-05-11 PROCEDURE — 85025 COMPLETE CBC W/AUTO DIFF WBC: CPT

## 2023-05-11 PROCEDURE — 82962 GLUCOSE BLOOD TEST: CPT

## 2023-05-11 PROCEDURE — 80053 COMPREHEN METABOLIC PANEL: CPT

## 2023-05-11 PROCEDURE — 2580000003 HC RX 258: Performed by: FAMILY MEDICINE

## 2023-05-11 PROCEDURE — 93005 ELECTROCARDIOGRAM TRACING: CPT | Performed by: STUDENT IN AN ORGANIZED HEALTH CARE EDUCATION/TRAINING PROGRAM

## 2023-05-11 PROCEDURE — 83605 ASSAY OF LACTIC ACID: CPT

## 2023-05-11 PROCEDURE — 99285 EMERGENCY DEPT VISIT HI MDM: CPT

## 2023-05-11 PROCEDURE — 6360000002 HC RX W HCPCS: Performed by: FAMILY MEDICINE

## 2023-05-11 PROCEDURE — 84484 ASSAY OF TROPONIN QUANT: CPT

## 2023-05-11 PROCEDURE — 1100000000 HC RM PRIVATE

## 2023-05-11 PROCEDURE — 36415 COLL VENOUS BLD VENIPUNCTURE: CPT

## 2023-05-11 RX ORDER — INSULIN LISPRO 100 [IU]/ML
0-4 INJECTION, SOLUTION INTRAVENOUS; SUBCUTANEOUS NIGHTLY
Status: DISCONTINUED | OUTPATIENT
Start: 2023-05-11 | End: 2023-05-14 | Stop reason: HOSPADM

## 2023-05-11 RX ORDER — INSULIN LISPRO 100 [IU]/ML
0-16 INJECTION, SOLUTION INTRAVENOUS; SUBCUTANEOUS
Status: DISCONTINUED | OUTPATIENT
Start: 2023-05-11 | End: 2023-05-14 | Stop reason: HOSPADM

## 2023-05-11 RX ORDER — DULAGLUTIDE 1.5 MG/.5ML
1.5 INJECTION, SOLUTION SUBCUTANEOUS WEEKLY
Status: ON HOLD | COMMUNITY
End: 2023-05-14 | Stop reason: HOSPADM

## 2023-05-11 RX ORDER — ONDANSETRON 2 MG/ML
4 INJECTION INTRAMUSCULAR; INTRAVENOUS EVERY 6 HOURS PRN
Status: DISCONTINUED | OUTPATIENT
Start: 2023-05-11 | End: 2023-05-14 | Stop reason: HOSPADM

## 2023-05-11 RX ORDER — METOPROLOL SUCCINATE 25 MG/1
25 TABLET, EXTENDED RELEASE ORAL 2 TIMES DAILY
Status: DISCONTINUED | OUTPATIENT
Start: 2023-05-11 | End: 2023-05-14 | Stop reason: HOSPADM

## 2023-05-11 RX ORDER — ONDANSETRON 4 MG/1
4 TABLET, ORALLY DISINTEGRATING ORAL EVERY 8 HOURS PRN
Status: DISCONTINUED | OUTPATIENT
Start: 2023-05-11 | End: 2023-05-14 | Stop reason: HOSPADM

## 2023-05-11 RX ORDER — TAMSULOSIN HYDROCHLORIDE 0.4 MG/1
0.4 CAPSULE ORAL DAILY
Status: DISCONTINUED | OUTPATIENT
Start: 2023-05-12 | End: 2023-05-14 | Stop reason: HOSPADM

## 2023-05-11 RX ORDER — INSULIN GLARGINE 100 [IU]/ML
14 INJECTION, SOLUTION SUBCUTANEOUS DAILY
Status: DISCONTINUED | OUTPATIENT
Start: 2023-05-11 | End: 2023-05-13

## 2023-05-11 RX ORDER — ATORVASTATIN CALCIUM 40 MG/1
40 TABLET, FILM COATED ORAL
Status: DISCONTINUED | OUTPATIENT
Start: 2023-05-11 | End: 2023-05-14 | Stop reason: HOSPADM

## 2023-05-11 RX ORDER — OXYBUTYNIN CHLORIDE 5 MG/1
5 TABLET ORAL DAILY
Status: DISCONTINUED | OUTPATIENT
Start: 2023-05-12 | End: 2023-05-14 | Stop reason: HOSPADM

## 2023-05-11 RX ORDER — BETHANECHOL CHLORIDE 25 MG/1
25 TABLET ORAL 3 TIMES DAILY
Status: DISCONTINUED | OUTPATIENT
Start: 2023-05-11 | End: 2023-05-14 | Stop reason: HOSPADM

## 2023-05-11 RX ORDER — SODIUM CHLORIDE 9 MG/ML
INJECTION, SOLUTION INTRAVENOUS PRN
Status: DISCONTINUED | OUTPATIENT
Start: 2023-05-11 | End: 2023-05-14 | Stop reason: HOSPADM

## 2023-05-11 RX ORDER — POLYETHYLENE GLYCOL 3350 17 G/17G
17 POWDER, FOR SOLUTION ORAL DAILY PRN
Status: DISCONTINUED | OUTPATIENT
Start: 2023-05-11 | End: 2023-05-14 | Stop reason: HOSPADM

## 2023-05-11 RX ORDER — CLOPIDOGREL BISULFATE 75 MG/1
75 TABLET ORAL DAILY
Status: DISCONTINUED | OUTPATIENT
Start: 2023-05-12 | End: 2023-05-14 | Stop reason: HOSPADM

## 2023-05-11 RX ORDER — PANTOPRAZOLE SODIUM 20 MG/1
20 TABLET, DELAYED RELEASE ORAL DAILY
Status: DISCONTINUED | OUTPATIENT
Start: 2023-05-11 | End: 2023-05-14 | Stop reason: HOSPADM

## 2023-05-11 RX ORDER — FERROUS SULFATE 325(65) MG
325 TABLET ORAL 2 TIMES DAILY
Status: DISCONTINUED | OUTPATIENT
Start: 2023-05-11 | End: 2023-05-14 | Stop reason: HOSPADM

## 2023-05-11 RX ORDER — 0.9 % SODIUM CHLORIDE 0.9 %
1000 INTRAVENOUS SOLUTION INTRAVENOUS ONCE
Status: COMPLETED | OUTPATIENT
Start: 2023-05-11 | End: 2023-05-11

## 2023-05-11 RX ORDER — SODIUM CHLORIDE 0.9 % (FLUSH) 0.9 %
5-40 SYRINGE (ML) INJECTION PRN
Status: DISCONTINUED | OUTPATIENT
Start: 2023-05-11 | End: 2023-05-14 | Stop reason: HOSPADM

## 2023-05-11 RX ORDER — M-VIT,TX,IRON,MINS/CALC/FOLIC 27MG-0.4MG
1 TABLET ORAL DAILY
Status: ON HOLD | COMMUNITY
End: 2023-05-14 | Stop reason: HOSPADM

## 2023-05-11 RX ORDER — ALOGLIPTIN 6.25 MG/1
6.25 TABLET, FILM COATED ORAL DAILY
Status: DISCONTINUED | OUTPATIENT
Start: 2023-05-12 | End: 2023-05-14 | Stop reason: HOSPADM

## 2023-05-11 RX ORDER — TAMSULOSIN HYDROCHLORIDE 0.4 MG/1
0.4 CAPSULE ORAL DAILY
Status: DISCONTINUED | OUTPATIENT
Start: 2023-05-11 | End: 2023-05-11

## 2023-05-11 RX ORDER — ASCORBIC ACID 500 MG
500 TABLET ORAL 2 TIMES DAILY
Status: DISCONTINUED | OUTPATIENT
Start: 2023-05-11 | End: 2023-05-14 | Stop reason: HOSPADM

## 2023-05-11 RX ORDER — ACETAMINOPHEN 325 MG/1
650 TABLET ORAL EVERY 6 HOURS PRN
Status: DISCONTINUED | OUTPATIENT
Start: 2023-05-11 | End: 2023-05-14 | Stop reason: HOSPADM

## 2023-05-11 RX ORDER — DILTIAZEM HYDROCHLORIDE 180 MG/1
180 CAPSULE, EXTENDED RELEASE ORAL DAILY
Status: DISCONTINUED | OUTPATIENT
Start: 2023-05-12 | End: 2023-05-14 | Stop reason: HOSPADM

## 2023-05-11 RX ORDER — FINASTERIDE 5 MG/1
5 TABLET, FILM COATED ORAL DAILY
Status: DISCONTINUED | OUTPATIENT
Start: 2023-05-12 | End: 2023-05-14 | Stop reason: HOSPADM

## 2023-05-11 RX ORDER — DEXTROSE MONOHYDRATE 100 MG/ML
INJECTION, SOLUTION INTRAVENOUS CONTINUOUS PRN
Status: DISCONTINUED | OUTPATIENT
Start: 2023-05-11 | End: 2023-05-14 | Stop reason: HOSPADM

## 2023-05-11 RX ORDER — ACETAMINOPHEN 650 MG/1
650 SUPPOSITORY RECTAL EVERY 6 HOURS PRN
Status: DISCONTINUED | OUTPATIENT
Start: 2023-05-11 | End: 2023-05-14 | Stop reason: HOSPADM

## 2023-05-11 RX ORDER — LOSARTAN POTASSIUM 25 MG/1
25 TABLET ORAL DAILY
Status: ON HOLD | COMMUNITY
End: 2023-05-14 | Stop reason: HOSPADM

## 2023-05-11 RX ORDER — SODIUM CHLORIDE 9 MG/ML
INJECTION, SOLUTION INTRAVENOUS CONTINUOUS
Status: DISCONTINUED | OUTPATIENT
Start: 2023-05-11 | End: 2023-05-14 | Stop reason: HOSPADM

## 2023-05-11 RX ORDER — HEPARIN SODIUM 5000 [USP'U]/ML
5000 INJECTION, SOLUTION INTRAVENOUS; SUBCUTANEOUS EVERY 8 HOURS SCHEDULED
Status: DISCONTINUED | OUTPATIENT
Start: 2023-05-11 | End: 2023-05-14 | Stop reason: HOSPADM

## 2023-05-11 RX ORDER — SODIUM CHLORIDE 0.9 % (FLUSH) 0.9 %
5-40 SYRINGE (ML) INJECTION EVERY 12 HOURS SCHEDULED
Status: DISCONTINUED | OUTPATIENT
Start: 2023-05-11 | End: 2023-05-14 | Stop reason: HOSPADM

## 2023-05-11 RX ADMIN — SODIUM CHLORIDE: 9 INJECTION, SOLUTION INTRAVENOUS at 18:40

## 2023-05-11 RX ADMIN — INSULIN HUMAN 6 UNITS: 100 INJECTION, SOLUTION PARENTERAL at 17:10

## 2023-05-11 RX ADMIN — HEPARIN SODIUM 5000 UNITS: 5000 INJECTION INTRAVENOUS; SUBCUTANEOUS at 22:03

## 2023-05-11 RX ADMIN — INSULIN LISPRO 4 UNITS: 100 INJECTION, SOLUTION INTRAVENOUS; SUBCUTANEOUS at 22:04

## 2023-05-11 RX ADMIN — CEFTRIAXONE SODIUM 1000 MG: 1 INJECTION, POWDER, FOR SOLUTION INTRAMUSCULAR; INTRAVENOUS at 18:31

## 2023-05-11 RX ADMIN — INSULIN LISPRO 6 UNITS: 100 INJECTION, SOLUTION INTRAVENOUS; SUBCUTANEOUS at 18:39

## 2023-05-11 RX ADMIN — SODIUM CHLORIDE 1000 ML: 9 INJECTION, SOLUTION INTRAVENOUS at 17:11

## 2023-05-11 RX ADMIN — INSULIN GLARGINE 14 UNITS: 100 INJECTION, SOLUTION SUBCUTANEOUS at 18:37

## 2023-05-11 RX ADMIN — SODIUM CHLORIDE, PRESERVATIVE FREE 10 ML: 5 INJECTION INTRAVENOUS at 22:04

## 2023-05-11 ASSESSMENT — PAIN SCALES - GENERAL: PAINLEVEL_OUTOF10: 0

## 2023-05-12 LAB
ALBUMIN SERPL-MCNC: 3.1 G/DL (ref 3.5–5)
ALBUMIN/GLOB SERPL: 0.7 (ref 1.1–2.2)
ALP SERPL-CCNC: 125 U/L (ref 45–117)
ALT SERPL-CCNC: 23 U/L (ref 12–78)
ANION GAP SERPL CALC-SCNC: 5 MMOL/L (ref 5–15)
APPEARANCE UR: CLEAR
AST SERPL W P-5'-P-CCNC: 12 U/L (ref 15–37)
BACTERIA URNS QL MICRO: NEGATIVE /HPF
BASOPHILS # BLD: 0 K/UL (ref 0–0.1)
BASOPHILS NFR BLD: 0 % (ref 0–1)
BILIRUB SERPL-MCNC: 0.3 MG/DL (ref 0.2–1)
BILIRUB UR QL: NEGATIVE
BUN SERPL-MCNC: 28 MG/DL (ref 6–20)
BUN/CREAT SERPL: 20 (ref 12–20)
CA-I BLD-MCNC: 8.5 MG/DL (ref 8.5–10.1)
CHLORIDE SERPL-SCNC: 107 MMOL/L (ref 97–108)
CO2 SERPL-SCNC: 21 MMOL/L (ref 21–32)
COLOR UR: ABNORMAL
CREAT SERPL-MCNC: 1.38 MG/DL (ref 0.7–1.3)
DIFFERENTIAL METHOD BLD: ABNORMAL
EKG ATRIAL RATE: 115 BPM
EKG DIAGNOSIS: NORMAL
EKG P AXIS: 46 DEGREES
EKG P-R INTERVAL: 128 MS
EKG Q-T INTERVAL: 306 MS
EKG QRS DURATION: 66 MS
EKG QTC CALCULATION (BAZETT): 423 MS
EKG R AXIS: 46 DEGREES
EKG T AXIS: 36 DEGREES
EKG VENTRICULAR RATE: 115 BPM
EOSINOPHIL # BLD: 0.1 K/UL (ref 0–0.4)
EOSINOPHIL NFR BLD: 1 % (ref 0–7)
ERYTHROCYTE [DISTWIDTH] IN BLOOD BY AUTOMATED COUNT: 13.4 % (ref 11.5–14.5)
GLOBULIN SER CALC-MCNC: 4.6 G/DL (ref 2–4)
GLUCOSE BLD STRIP.AUTO-MCNC: 322 MG/DL (ref 65–100)
GLUCOSE BLD STRIP.AUTO-MCNC: 377 MG/DL (ref 65–100)
GLUCOSE BLD STRIP.AUTO-MCNC: 455 MG/DL (ref 65–100)
GLUCOSE BLD STRIP.AUTO-MCNC: 479 MG/DL (ref 65–100)
GLUCOSE SERPL-MCNC: 494 MG/DL (ref 65–100)
GLUCOSE UR STRIP.AUTO-MCNC: >300 MG/DL
HCT VFR BLD AUTO: 35.4 % (ref 36.6–50.3)
HGB BLD-MCNC: 11.3 G/DL (ref 12.1–17)
HGB UR QL STRIP: NEGATIVE
IMM GRANULOCYTES # BLD AUTO: 0.1 K/UL (ref 0–0.04)
IMM GRANULOCYTES NFR BLD AUTO: 1 % (ref 0–0.5)
KETONES UR QL STRIP.AUTO: 20 MG/DL
LEUKOCYTE ESTERASE UR QL STRIP.AUTO: NEGATIVE
LYMPHOCYTES # BLD: 1.9 K/UL (ref 0.8–3.5)
LYMPHOCYTES NFR BLD: 24 % (ref 12–49)
MCH RBC QN AUTO: 29.9 PG (ref 26–34)
MCHC RBC AUTO-ENTMCNC: 31.9 G/DL (ref 30–36.5)
MCV RBC AUTO: 93.7 FL (ref 80–99)
MONOCYTES # BLD: 0.6 K/UL (ref 0–1)
MONOCYTES NFR BLD: 7 % (ref 5–13)
NEUTS SEG # BLD: 5.4 K/UL (ref 1.8–8)
NEUTS SEG NFR BLD: 67 % (ref 32–75)
NITRITE UR QL STRIP.AUTO: NEGATIVE
NRBC # BLD: 0 K/UL (ref 0–0.01)
NRBC BLD-RTO: 0 PER 100 WBC
PERFORMED BY:: ABNORMAL
PH UR STRIP: 5 (ref 5–8)
PLATELET # BLD AUTO: 148 K/UL (ref 150–400)
POTASSIUM SERPL-SCNC: 4.1 MMOL/L (ref 3.5–5.1)
PROT SERPL-MCNC: 7.7 G/DL (ref 6.4–8.2)
PROT UR STRIP-MCNC: 30 MG/DL
RBC # BLD AUTO: 3.78 M/UL (ref 4.1–5.7)
RBC #/AREA URNS HPF: ABNORMAL /HPF (ref 0–5)
SODIUM SERPL-SCNC: 133 MMOL/L (ref 136–145)
SP GR UR REFRACTOMETRY: >1.03 (ref 1–1.03)
URINE CULTURE IF INDICATED: ABNORMAL
UROBILINOGEN UR QL STRIP.AUTO: 0.1 EU/DL (ref 0.1–1)
WBC # BLD AUTO: 8 K/UL (ref 4.1–11.1)
WBC URNS QL MICRO: ABNORMAL /HPF (ref 0–4)

## 2023-05-12 PROCEDURE — 1100000000 HC RM PRIVATE

## 2023-05-12 PROCEDURE — 82962 GLUCOSE BLOOD TEST: CPT

## 2023-05-12 PROCEDURE — 80307 DRUG TEST PRSMV CHEM ANLYZR: CPT

## 2023-05-12 PROCEDURE — 36415 COLL VENOUS BLD VENIPUNCTURE: CPT

## 2023-05-12 PROCEDURE — 6370000000 HC RX 637 (ALT 250 FOR IP): Performed by: FAMILY MEDICINE

## 2023-05-12 PROCEDURE — 80053 COMPREHEN METABOLIC PANEL: CPT

## 2023-05-12 PROCEDURE — 2580000003 HC RX 258: Performed by: FAMILY MEDICINE

## 2023-05-12 PROCEDURE — 6360000002 HC RX W HCPCS: Performed by: FAMILY MEDICINE

## 2023-05-12 PROCEDURE — 85025 COMPLETE CBC W/AUTO DIFF WBC: CPT

## 2023-05-12 PROCEDURE — 87086 URINE CULTURE/COLONY COUNT: CPT

## 2023-05-12 PROCEDURE — 81001 URINALYSIS AUTO W/SCOPE: CPT

## 2023-05-12 RX ORDER — INSULIN LISPRO 100 [IU]/ML
18 INJECTION, SOLUTION INTRAVENOUS; SUBCUTANEOUS ONCE
Status: COMPLETED | OUTPATIENT
Start: 2023-05-12 | End: 2023-05-12

## 2023-05-12 RX ADMIN — INSULIN LISPRO 16 UNITS: 100 INJECTION, SOLUTION INTRAVENOUS; SUBCUTANEOUS at 12:16

## 2023-05-12 RX ADMIN — BETHANECHOL CHLORIDE 25 MG: 25 TABLET ORAL at 22:15

## 2023-05-12 RX ADMIN — FERROUS SULFATE TAB 325 MG (65 MG ELEMENTAL FE) 325 MG: 325 (65 FE) TAB at 00:50

## 2023-05-12 RX ADMIN — DILTIAZEM HYDROCHLORIDE 180 MG: 180 CAPSULE, EXTENDED RELEASE ORAL at 08:44

## 2023-05-12 RX ADMIN — HEPARIN SODIUM 5000 UNITS: 5000 INJECTION INTRAVENOUS; SUBCUTANEOUS at 14:23

## 2023-05-12 RX ADMIN — SODIUM CHLORIDE: 9 INJECTION, SOLUTION INTRAVENOUS at 09:32

## 2023-05-12 RX ADMIN — HEPARIN SODIUM 5000 UNITS: 5000 INJECTION INTRAVENOUS; SUBCUTANEOUS at 22:15

## 2023-05-12 RX ADMIN — FINASTERIDE 5 MG: 5 TABLET, FILM COATED ORAL at 08:44

## 2023-05-12 RX ADMIN — FERROUS SULFATE TAB 325 MG (65 MG ELEMENTAL FE) 325 MG: 325 (65 FE) TAB at 08:44

## 2023-05-12 RX ADMIN — OXYCODONE HYDROCHLORIDE AND ACETAMINOPHEN 500 MG: 500 TABLET ORAL at 08:43

## 2023-05-12 RX ADMIN — ATORVASTATIN CALCIUM 40 MG: 40 TABLET, FILM COATED ORAL at 00:50

## 2023-05-12 RX ADMIN — CLOPIDOGREL BISULFATE 75 MG: 75 TABLET ORAL at 08:43

## 2023-05-12 RX ADMIN — OXYBUTYNIN CHLORIDE 5 MG: 5 TABLET ORAL at 08:44

## 2023-05-12 RX ADMIN — SODIUM CHLORIDE, PRESERVATIVE FREE 10 ML: 5 INJECTION INTRAVENOUS at 08:50

## 2023-05-12 RX ADMIN — CEFTRIAXONE SODIUM 1000 MG: 1 INJECTION, POWDER, FOR SOLUTION INTRAMUSCULAR; INTRAVENOUS at 17:31

## 2023-05-12 RX ADMIN — OXYCODONE HYDROCHLORIDE AND ACETAMINOPHEN 500 MG: 500 TABLET ORAL at 22:15

## 2023-05-12 RX ADMIN — SODIUM CHLORIDE, PRESERVATIVE FREE 10 ML: 5 INJECTION INTRAVENOUS at 22:16

## 2023-05-12 RX ADMIN — ATORVASTATIN CALCIUM 40 MG: 40 TABLET, FILM COATED ORAL at 22:15

## 2023-05-12 RX ADMIN — PANTOPRAZOLE SODIUM 20 MG: 20 TABLET, DELAYED RELEASE ORAL at 08:45

## 2023-05-12 RX ADMIN — FERROUS SULFATE TAB 325 MG (65 MG ELEMENTAL FE) 325 MG: 325 (65 FE) TAB at 22:15

## 2023-05-12 RX ADMIN — BETHANECHOL CHLORIDE 25 MG: 25 TABLET ORAL at 08:44

## 2023-05-12 RX ADMIN — HEPARIN SODIUM 5000 UNITS: 5000 INJECTION INTRAVENOUS; SUBCUTANEOUS at 05:44

## 2023-05-12 RX ADMIN — METOPROLOL SUCCINATE 25 MG: 25 TABLET, EXTENDED RELEASE ORAL at 22:15

## 2023-05-12 RX ADMIN — ALOGLIPTIN 6.25 MG: 6.25 TABLET, FILM COATED ORAL at 08:43

## 2023-05-12 RX ADMIN — BETHANECHOL CHLORIDE 25 MG: 25 TABLET ORAL at 14:24

## 2023-05-12 RX ADMIN — OXYCODONE HYDROCHLORIDE AND ACETAMINOPHEN 500 MG: 500 TABLET ORAL at 00:50

## 2023-05-12 RX ADMIN — INSULIN LISPRO 4 UNITS: 100 INJECTION, SOLUTION INTRAVENOUS; SUBCUTANEOUS at 22:15

## 2023-05-12 RX ADMIN — INSULIN GLARGINE 14 UNITS: 100 INJECTION, SOLUTION SUBCUTANEOUS at 08:45

## 2023-05-12 RX ADMIN — METOPROLOL SUCCINATE 25 MG: 25 TABLET, EXTENDED RELEASE ORAL at 08:44

## 2023-05-12 RX ADMIN — INSULIN LISPRO 12 UNITS: 100 INJECTION, SOLUTION INTRAVENOUS; SUBCUTANEOUS at 08:45

## 2023-05-12 RX ADMIN — TAMSULOSIN HYDROCHLORIDE 0.4 MG: 0.4 CAPSULE ORAL at 08:43

## 2023-05-12 RX ADMIN — INSULIN LISPRO 18 UNITS: 100 INJECTION, SOLUTION INTRAVENOUS; SUBCUTANEOUS at 16:45

## 2023-05-12 RX ADMIN — BETHANECHOL CHLORIDE 25 MG: 25 TABLET ORAL at 00:50

## 2023-05-12 ASSESSMENT — PAIN SCALES - GENERAL
PAINLEVEL_OUTOF10: 0
PAINLEVEL_OUTOF10: 0

## 2023-05-12 ASSESSMENT — PAIN SCALES - WONG BAKER
WONGBAKER_NUMERICALRESPONSE: 0
WONGBAKER_NUMERICALRESPONSE: 0

## 2023-05-13 LAB
AMPHET UR QL SCN: NEGATIVE
BACTERIA SPEC CULT: NORMAL
BARBITURATES UR QL SCN: NEGATIVE
BENZODIAZ UR QL: NEGATIVE
CANNABINOIDS UR QL SCN: NEGATIVE
COCAINE UR QL SCN: NEGATIVE
COLONY COUNT, CNT: NORMAL
COLONY COUNT, CNT: NORMAL
GLUCOSE BLD STRIP.AUTO-MCNC: 274 MG/DL (ref 65–100)
GLUCOSE BLD STRIP.AUTO-MCNC: 278 MG/DL (ref 65–100)
GLUCOSE BLD STRIP.AUTO-MCNC: 335 MG/DL (ref 65–100)
GLUCOSE BLD STRIP.AUTO-MCNC: 342 MG/DL (ref 65–100)
Lab: NORMAL
Lab: NORMAL
METHADONE UR QL: NEGATIVE
OPIATES UR QL: NEGATIVE
PCP UR QL: NEGATIVE
PERFORMED BY:: ABNORMAL

## 2023-05-13 PROCEDURE — 1100000000 HC RM PRIVATE

## 2023-05-13 PROCEDURE — 82962 GLUCOSE BLOOD TEST: CPT

## 2023-05-13 PROCEDURE — 2580000003 HC RX 258: Performed by: FAMILY MEDICINE

## 2023-05-13 PROCEDURE — 6360000002 HC RX W HCPCS: Performed by: FAMILY MEDICINE

## 2023-05-13 PROCEDURE — 6370000000 HC RX 637 (ALT 250 FOR IP): Performed by: FAMILY MEDICINE

## 2023-05-13 RX ORDER — INSULIN GLARGINE 100 [IU]/ML
14 INJECTION, SOLUTION SUBCUTANEOUS 2 TIMES DAILY
Status: DISCONTINUED | OUTPATIENT
Start: 2023-05-13 | End: 2023-05-14 | Stop reason: HOSPADM

## 2023-05-13 RX ADMIN — INSULIN LISPRO 12 UNITS: 100 INJECTION, SOLUTION INTRAVENOUS; SUBCUTANEOUS at 09:34

## 2023-05-13 RX ADMIN — BETHANECHOL CHLORIDE 25 MG: 25 TABLET ORAL at 14:54

## 2023-05-13 RX ADMIN — SODIUM CHLORIDE: 9 INJECTION, SOLUTION INTRAVENOUS at 21:33

## 2023-05-13 RX ADMIN — OXYCODONE HYDROCHLORIDE AND ACETAMINOPHEN 500 MG: 500 TABLET ORAL at 21:28

## 2023-05-13 RX ADMIN — METOPROLOL SUCCINATE 25 MG: 25 TABLET, EXTENDED RELEASE ORAL at 21:28

## 2023-05-13 RX ADMIN — INSULIN LISPRO 8 UNITS: 100 INJECTION, SOLUTION INTRAVENOUS; SUBCUTANEOUS at 18:06

## 2023-05-13 RX ADMIN — FERROUS SULFATE TAB 325 MG (65 MG ELEMENTAL FE) 325 MG: 325 (65 FE) TAB at 21:28

## 2023-05-13 RX ADMIN — ALOGLIPTIN 6.25 MG: 6.25 TABLET, FILM COATED ORAL at 09:33

## 2023-05-13 RX ADMIN — ACETAMINOPHEN 650 MG: 325 TABLET ORAL at 21:29

## 2023-05-13 RX ADMIN — PANTOPRAZOLE SODIUM 20 MG: 20 TABLET, DELAYED RELEASE ORAL at 09:34

## 2023-05-13 RX ADMIN — TAMSULOSIN HYDROCHLORIDE 0.4 MG: 0.4 CAPSULE ORAL at 09:35

## 2023-05-13 RX ADMIN — OXYCODONE HYDROCHLORIDE AND ACETAMINOPHEN 500 MG: 500 TABLET ORAL at 09:35

## 2023-05-13 RX ADMIN — HEPARIN SODIUM 5000 UNITS: 5000 INJECTION INTRAVENOUS; SUBCUTANEOUS at 21:30

## 2023-05-13 RX ADMIN — INSULIN GLARGINE 14 UNITS: 100 INJECTION, SOLUTION SUBCUTANEOUS at 21:44

## 2023-05-13 RX ADMIN — ONDANSETRON 4 MG: 2 INJECTION INTRAMUSCULAR; INTRAVENOUS at 21:30

## 2023-05-13 RX ADMIN — HEPARIN SODIUM 5000 UNITS: 5000 INJECTION INTRAVENOUS; SUBCUTANEOUS at 14:54

## 2023-05-13 RX ADMIN — DILTIAZEM HYDROCHLORIDE 180 MG: 180 CAPSULE, EXTENDED RELEASE ORAL at 09:35

## 2023-05-13 RX ADMIN — INSULIN LISPRO 12 UNITS: 100 INJECTION, SOLUTION INTRAVENOUS; SUBCUTANEOUS at 13:04

## 2023-05-13 RX ADMIN — HEPARIN SODIUM 5000 UNITS: 5000 INJECTION INTRAVENOUS; SUBCUTANEOUS at 05:15

## 2023-05-13 RX ADMIN — CEFTRIAXONE SODIUM 1000 MG: 1 INJECTION, POWDER, FOR SOLUTION INTRAMUSCULAR; INTRAVENOUS at 18:06

## 2023-05-13 RX ADMIN — FERROUS SULFATE TAB 325 MG (65 MG ELEMENTAL FE) 325 MG: 325 (65 FE) TAB at 09:35

## 2023-05-13 RX ADMIN — OXYBUTYNIN CHLORIDE 5 MG: 5 TABLET ORAL at 09:35

## 2023-05-13 RX ADMIN — METOPROLOL SUCCINATE 25 MG: 25 TABLET, EXTENDED RELEASE ORAL at 09:35

## 2023-05-13 RX ADMIN — BETHANECHOL CHLORIDE 25 MG: 25 TABLET ORAL at 21:29

## 2023-05-13 RX ADMIN — SODIUM CHLORIDE, PRESERVATIVE FREE 10 ML: 5 INJECTION INTRAVENOUS at 21:24

## 2023-05-13 RX ADMIN — CLOPIDOGREL BISULFATE 75 MG: 75 TABLET ORAL at 09:34

## 2023-05-13 RX ADMIN — SODIUM CHLORIDE, PRESERVATIVE FREE 10 ML: 5 INJECTION INTRAVENOUS at 09:34

## 2023-05-13 RX ADMIN — FINASTERIDE 5 MG: 5 TABLET, FILM COATED ORAL at 09:35

## 2023-05-13 RX ADMIN — INSULIN GLARGINE 14 UNITS: 100 INJECTION, SOLUTION SUBCUTANEOUS at 09:34

## 2023-05-13 RX ADMIN — BETHANECHOL CHLORIDE 25 MG: 25 TABLET ORAL at 09:33

## 2023-05-13 RX ADMIN — ATORVASTATIN CALCIUM 40 MG: 40 TABLET, FILM COATED ORAL at 21:29

## 2023-05-13 ASSESSMENT — PAIN - FUNCTIONAL ASSESSMENT: PAIN_FUNCTIONAL_ASSESSMENT: ACTIVITIES ARE NOT PREVENTED

## 2023-05-13 ASSESSMENT — PAIN DESCRIPTION - ORIENTATION: ORIENTATION: MID

## 2023-05-13 ASSESSMENT — PAIN SCALES - WONG BAKER: WONGBAKER_NUMERICALRESPONSE: 0

## 2023-05-13 ASSESSMENT — PAIN DESCRIPTION - LOCATION: LOCATION: ABDOMEN

## 2023-05-13 ASSESSMENT — PAIN DESCRIPTION - DESCRIPTORS: DESCRIPTORS: ACHING

## 2023-05-13 ASSESSMENT — PAIN SCALES - GENERAL
PAINLEVEL_OUTOF10: 0
PAINLEVEL_OUTOF10: 3

## 2023-05-14 VITALS
BODY MASS INDEX: 33.39 KG/M2 | RESPIRATION RATE: 16 BRPM | WEIGHT: 200.4 LBS | OXYGEN SATURATION: 100 % | TEMPERATURE: 97.2 F | HEART RATE: 92 BPM | DIASTOLIC BLOOD PRESSURE: 81 MMHG | SYSTOLIC BLOOD PRESSURE: 147 MMHG | HEIGHT: 65 IN

## 2023-05-14 PROBLEM — R41.82 AMS (ALTERED MENTAL STATUS): Status: RESOLVED | Noted: 2023-05-11 | Resolved: 2023-05-14

## 2023-05-14 PROBLEM — E86.0 DEHYDRATION: Status: RESOLVED | Noted: 2023-05-11 | Resolved: 2023-05-14

## 2023-05-14 LAB
GLUCOSE BLD STRIP.AUTO-MCNC: 231 MG/DL (ref 65–100)
GLUCOSE BLD STRIP.AUTO-MCNC: 394 MG/DL (ref 65–100)
PERFORMED BY:: ABNORMAL
PERFORMED BY:: ABNORMAL

## 2023-05-14 PROCEDURE — 6360000002 HC RX W HCPCS: Performed by: FAMILY MEDICINE

## 2023-05-14 PROCEDURE — 82962 GLUCOSE BLOOD TEST: CPT

## 2023-05-14 PROCEDURE — 6370000000 HC RX 637 (ALT 250 FOR IP): Performed by: FAMILY MEDICINE

## 2023-05-14 PROCEDURE — 2580000003 HC RX 258: Performed by: FAMILY MEDICINE

## 2023-05-14 RX ORDER — INSULIN DETEMIR 100 [IU]/ML
14 INJECTION, SOLUTION SUBCUTANEOUS 2 TIMES DAILY
Qty: 4 EACH | Refills: 2 | Status: SHIPPED | OUTPATIENT
Start: 2023-05-14

## 2023-05-14 RX ORDER — METOPROLOL SUCCINATE 25 MG/1
25 TABLET, EXTENDED RELEASE ORAL 2 TIMES DAILY
Qty: 30 TABLET | Refills: 3 | Status: SHIPPED | OUTPATIENT
Start: 2023-05-14

## 2023-05-14 RX ADMIN — FERROUS SULFATE TAB 325 MG (65 MG ELEMENTAL FE) 325 MG: 325 (65 FE) TAB at 09:01

## 2023-05-14 RX ADMIN — DILTIAZEM HYDROCHLORIDE 180 MG: 180 CAPSULE, EXTENDED RELEASE ORAL at 09:00

## 2023-05-14 RX ADMIN — OXYBUTYNIN CHLORIDE 5 MG: 5 TABLET ORAL at 09:01

## 2023-05-14 RX ADMIN — OXYCODONE HYDROCHLORIDE AND ACETAMINOPHEN 500 MG: 500 TABLET ORAL at 09:01

## 2023-05-14 RX ADMIN — FINASTERIDE 5 MG: 5 TABLET, FILM COATED ORAL at 09:01

## 2023-05-14 RX ADMIN — SODIUM CHLORIDE, PRESERVATIVE FREE 10 ML: 5 INJECTION INTRAVENOUS at 09:07

## 2023-05-14 RX ADMIN — PANTOPRAZOLE SODIUM 20 MG: 20 TABLET, DELAYED RELEASE ORAL at 09:01

## 2023-05-14 RX ADMIN — CLOPIDOGREL BISULFATE 75 MG: 75 TABLET ORAL at 09:01

## 2023-05-14 RX ADMIN — ALOGLIPTIN 6.25 MG: 6.25 TABLET, FILM COATED ORAL at 09:00

## 2023-05-14 RX ADMIN — INSULIN LISPRO 16 UNITS: 100 INJECTION, SOLUTION INTRAVENOUS; SUBCUTANEOUS at 13:11

## 2023-05-14 RX ADMIN — INSULIN GLARGINE 14 UNITS: 100 INJECTION, SOLUTION SUBCUTANEOUS at 09:02

## 2023-05-14 RX ADMIN — METOPROLOL SUCCINATE 25 MG: 25 TABLET, EXTENDED RELEASE ORAL at 09:01

## 2023-05-14 RX ADMIN — BETHANECHOL CHLORIDE 25 MG: 25 TABLET ORAL at 09:01

## 2023-05-14 RX ADMIN — TAMSULOSIN HYDROCHLORIDE 0.4 MG: 0.4 CAPSULE ORAL at 09:01

## 2023-05-14 RX ADMIN — HEPARIN SODIUM 5000 UNITS: 5000 INJECTION INTRAVENOUS; SUBCUTANEOUS at 05:48

## 2023-05-14 RX ADMIN — INSULIN LISPRO 4 UNITS: 100 INJECTION, SOLUTION INTRAVENOUS; SUBCUTANEOUS at 09:01

## 2023-05-14 ASSESSMENT — PAIN SCALES - WONG BAKER: WONGBAKER_NUMERICALRESPONSE: 0

## 2023-05-17 LAB
BACTERIA SPEC CULT: NORMAL
BACTERIA SPEC CULT: NORMAL
Lab: NORMAL
Lab: NORMAL

## 2023-08-14 ENCOUNTER — OFFICE VISIT (OUTPATIENT)
Age: 73
End: 2023-08-14
Payer: MEDICARE

## 2023-08-14 VITALS
WEIGHT: 200 LBS | SYSTOLIC BLOOD PRESSURE: 155 MMHG | BODY MASS INDEX: 33.32 KG/M2 | HEART RATE: 96 BPM | HEIGHT: 65 IN | OXYGEN SATURATION: 98 % | RESPIRATION RATE: 16 BRPM | TEMPERATURE: 98.7 F | DIASTOLIC BLOOD PRESSURE: 84 MMHG

## 2023-08-14 DIAGNOSIS — E13.621: Primary | ICD-10-CM

## 2023-08-14 DIAGNOSIS — L97.425: Primary | ICD-10-CM

## 2023-08-14 PROCEDURE — 99213 OFFICE O/P EST LOW 20 MIN: CPT | Performed by: SURGERY

## 2023-08-14 PROCEDURE — 3017F COLORECTAL CA SCREEN DOC REV: CPT | Performed by: SURGERY

## 2023-08-14 PROCEDURE — 1123F ACP DISCUSS/DSCN MKR DOCD: CPT | Performed by: SURGERY

## 2023-08-14 PROCEDURE — 1036F TOBACCO NON-USER: CPT | Performed by: SURGERY

## 2023-08-14 PROCEDURE — 3046F HEMOGLOBIN A1C LEVEL >9.0%: CPT | Performed by: SURGERY

## 2023-08-14 PROCEDURE — G8419 CALC BMI OUT NRM PARAM NOF/U: HCPCS | Performed by: SURGERY

## 2023-08-14 PROCEDURE — 2022F DILAT RTA XM EVC RTNOPTHY: CPT | Performed by: SURGERY

## 2023-08-14 PROCEDURE — 3079F DIAST BP 80-89 MM HG: CPT | Performed by: SURGERY

## 2023-08-14 PROCEDURE — G8427 DOCREV CUR MEDS BY ELIG CLIN: HCPCS | Performed by: SURGERY

## 2023-08-14 PROCEDURE — 3077F SYST BP >= 140 MM HG: CPT | Performed by: SURGERY

## 2023-08-16 ENCOUNTER — HOSPITAL ENCOUNTER (EMERGENCY)
Facility: HOSPITAL | Age: 73
Discharge: NURSING, REHAB OR LONG-TERM CARE | End: 2023-08-17
Attending: EMERGENCY MEDICINE
Payer: MEDICARE

## 2023-08-16 ENCOUNTER — APPOINTMENT (OUTPATIENT)
Facility: HOSPITAL | Age: 73
End: 2023-08-16
Payer: MEDICARE

## 2023-08-16 DIAGNOSIS — N47.1 PHIMOSIS OF PENIS: Primary | ICD-10-CM

## 2023-08-16 LAB
ALBUMIN SERPL-MCNC: 3.4 G/DL (ref 3.5–5)
ALBUMIN/GLOB SERPL: 0.8 (ref 1.1–2.2)
ALP SERPL-CCNC: 130 U/L (ref 45–117)
ALT SERPL-CCNC: 38 U/L (ref 12–78)
ANION GAP SERPL CALC-SCNC: 7 MMOL/L (ref 5–15)
AST SERPL W P-5'-P-CCNC: 14 U/L (ref 15–37)
BASOPHILS # BLD: 0 K/UL (ref 0–0.1)
BASOPHILS NFR BLD: 1 % (ref 0–1)
BILIRUB SERPL-MCNC: 0.2 MG/DL (ref 0.2–1)
BUN SERPL-MCNC: 18 MG/DL (ref 6–20)
BUN/CREAT SERPL: 12 (ref 12–20)
CA-I BLD-MCNC: 8.9 MG/DL (ref 8.5–10.1)
CHLORIDE SERPL-SCNC: 105 MMOL/L (ref 97–108)
CO2 SERPL-SCNC: 25 MMOL/L (ref 21–32)
CREAT SERPL-MCNC: 1.46 MG/DL (ref 0.7–1.3)
DIFFERENTIAL METHOD BLD: ABNORMAL
EOSINOPHIL # BLD: 0.6 K/UL (ref 0–0.4)
EOSINOPHIL NFR BLD: 7 % (ref 0–7)
ERYTHROCYTE [DISTWIDTH] IN BLOOD BY AUTOMATED COUNT: 15.3 % (ref 11.5–14.5)
GLOBULIN SER CALC-MCNC: 4.2 G/DL (ref 2–4)
GLUCOSE SERPL-MCNC: 175 MG/DL (ref 65–100)
HCT VFR BLD AUTO: 36.9 % (ref 36.6–50.3)
HGB BLD-MCNC: 11.6 G/DL (ref 12.1–17)
IMM GRANULOCYTES # BLD AUTO: 0.1 K/UL (ref 0–0.04)
IMM GRANULOCYTES NFR BLD AUTO: 1 % (ref 0–0.5)
LYMPHOCYTES # BLD: 1.9 K/UL (ref 0.8–3.5)
LYMPHOCYTES NFR BLD: 24 % (ref 12–49)
MCH RBC QN AUTO: 28.2 PG (ref 26–34)
MCHC RBC AUTO-ENTMCNC: 31.4 G/DL (ref 30–36.5)
MCV RBC AUTO: 89.6 FL (ref 80–99)
MONOCYTES # BLD: 0.8 K/UL (ref 0–1)
MONOCYTES NFR BLD: 10 % (ref 5–13)
NEUTS SEG # BLD: 4.5 K/UL (ref 1.8–8)
NEUTS SEG NFR BLD: 57 % (ref 32–75)
NRBC # BLD: 0 K/UL (ref 0–0.01)
NRBC BLD-RTO: 0 PER 100 WBC
PLATELET # BLD AUTO: 239 K/UL (ref 150–400)
PMV BLD AUTO: 11.5 FL (ref 8.9–12.9)
POTASSIUM SERPL-SCNC: 4.2 MMOL/L (ref 3.5–5.1)
PROT SERPL-MCNC: 7.6 G/DL (ref 6.4–8.2)
RBC # BLD AUTO: 4.12 M/UL (ref 4.1–5.7)
SODIUM SERPL-SCNC: 137 MMOL/L (ref 136–145)
WBC # BLD AUTO: 7.8 K/UL (ref 4.1–11.1)

## 2023-08-16 PROCEDURE — 85025 COMPLETE CBC W/AUTO DIFF WBC: CPT

## 2023-08-16 PROCEDURE — 74176 CT ABD & PELVIS W/O CONTRAST: CPT

## 2023-08-16 PROCEDURE — 36415 COLL VENOUS BLD VENIPUNCTURE: CPT

## 2023-08-16 PROCEDURE — 80053 COMPREHEN METABOLIC PANEL: CPT

## 2023-08-16 PROCEDURE — 99284 EMERGENCY DEPT VISIT MOD MDM: CPT

## 2023-08-16 RX ORDER — CLOTRIMAZOLE 1 %
CREAM (GRAM) TOPICAL
Qty: 28 G | Refills: 0 | Status: SHIPPED | OUTPATIENT
Start: 2023-08-16 | End: 2023-08-23

## 2023-08-16 ASSESSMENT — PAIN - FUNCTIONAL ASSESSMENT: PAIN_FUNCTIONAL_ASSESSMENT: WONG-BAKER FACES

## 2023-08-16 ASSESSMENT — LIFESTYLE VARIABLES
HOW OFTEN DO YOU HAVE A DRINK CONTAINING ALCOHOL: NEVER
HOW MANY STANDARD DRINKS CONTAINING ALCOHOL DO YOU HAVE ON A TYPICAL DAY: PATIENT DOES NOT DRINK

## 2023-08-16 ASSESSMENT — PAIN SCALES - WONG BAKER: WONGBAKER_NUMERICALRESPONSE: 4

## 2023-08-16 NOTE — ED TRIAGE NOTES
Patient comes from [facility] and [address] [facility] where staff report he has \"cottage cheese\" like discharge from he penis and abdomen appears more distended than normal. Patient is deaf/mute per staff

## 2023-08-17 VITALS
HEART RATE: 79 BPM | RESPIRATION RATE: 17 BRPM | TEMPERATURE: 98 F | OXYGEN SATURATION: 99 % | HEIGHT: 65 IN | WEIGHT: 200 LBS | DIASTOLIC BLOOD PRESSURE: 81 MMHG | BODY MASS INDEX: 33.32 KG/M2 | SYSTOLIC BLOOD PRESSURE: 135 MMHG

## 2023-08-17 NOTE — ED NOTES
Verbal report called to [employer] at [facility]. Discussed patient's disposition and plan and all questions and concerns answered.      [place], [place]  96/58/17 [id number]

## 2023-08-17 NOTE — ED NOTES
Attempted to straight cath patient for urine sample and failed. Provider at bedside to attempt and was unable to obtain sample. Patient not retaining urine as his brief was saturated at shift change.        Tiana Aschoff, [address]  48/40/05 [id number]

## 2023-08-17 NOTE — ED NOTES
[facility] called and stretcher transport set up for with. Trip number [id number].   Pippa from [place] gave an ETA of approx 1.5 hours     Collette Gibbons  08/16/23 [id number]

## 2023-08-17 NOTE — ED PROVIDER NOTES
[facility] EMERGENCY DEPT  EMERGENCY DEPARTMENT HISTORY AND PHYSICAL EXAM      Date: 8/16/2023  Patient Name: Lisa Stokes  MRN: [id number]  [address] [facility] [place] 1950  Date of evaluation: 8/16/2023  Provider: Beltran Dowell DO   Note Started: 8:37 PM EDT 8/16/23    HISTORY OF PRESENT ILLNESS     Chief Complaint   Patient presents with    Bloated    Penile Discharge       History Provided By: Patient    HPI: Lisa Stokes is a 74 y.o. male with past medical history significant for the below presenting to the emergency department as referral from rehab facility for evaluation of abdominal distention and penile irritation/discharge. Patient is deaf/mute. Attempted to communicate via writing, however patient only repeatedly writing his name despite different questions being asked. PAST MEDICAL HISTORY   Past Medical History:  Past Medical History:   Diagnosis Date    Anemia     BPH (benign prostatic hyperplasia)     Chronic kidney disease     Diabetes ([address] )     Hypercholesteremia     Hyperlipidemia     Neurological disorder     Nonspeaking deaf     Schizophrenia [facility])        Past Surgical History:  History reviewed. No pertinent surgical history. Family History:  History reviewed. No pertinent family history. Social History:  Social History     Tobacco Use    Smoking status: Never    Smokeless tobacco: Never   Vaping Use    Vaping Use: Unknown   Substance Use Topics    Alcohol use: Not Currently       Allergies: Allergies   Allergen Reactions    Lisinopril      Other reaction(s): Unknown (comments)  Unknown-pt unable to answer       PCP: Crys Clark MD    Current Meds:   No current facility-administered medications for this encounter. Current Outpatient Medications   Medication Sig Dispense Refill    clotrimazole (LOTRIMIN) 1 % cream Apply topically 2 times daily.  28 g 0    insulin detemir (LEVEMIR) 100 UNIT/ML injection vial Inject 14 Units into the skin 2 times daily 4 each 2    linagliptin (TRADJENTA) 5 MG

## 2023-08-17 NOTE — ED NOTES
[address] arrived to transport patient back to [facility] [facility]hab. Report given to [address] staff and all questions and concerns answered.      Reva hastings, [address] [place] [address]  06/61/47 [id number]

## 2023-08-17 NOTE — DISCHARGE INSTRUCTIONS
Rate 50 (L) >60 ml/min/1.73m2    Calcium 8.9 8.5 - 10.1 mg/dL    Total Bilirubin 0.2 0.2 - 1.0 mg/dL    AST 14 (L) 15 - 37 U/L    ALT 38 12 - 78 U/L    Alk Phosphatase 130 (H) 45 - 117 U/L    Total Protein 7.6 6.4 - 8.2 g/dL    Albumin 3.4 (L) 3.5 - 5.0 g/dL    Globulin 4.2 (H) 2.0 - 4.0 g/dL    Albumin/Globulin Ratio 0.8 (L) 1.1 - 2.2         Radiologic Studies  CT ABDOMEN PELVIS WO CONTRAST Additional Contrast? None   Final Result   Scrotum is not included in the field-of-view. Infrarenal IVC filter with diminutive nature of the IVC below the filter,   suggesting chronic occlusion. Prostatomegaly. Sigmoid colectomy with left lower quadrant colostomy. ------------------------------------------------------------------------------------------------------------  The exam and treatment you received in the Emergency Department were for an urgent problem and are not intended as complete care. It is important that you follow-up with a doctor, nurse practitioner, or physician assistant to:  (1) confirm your diagnosis,  (2) re-evaluation of changes in your illness and treatment, and  (3) for ongoing care. Please take your discharge instructions with you when you go to your follow-up appointment. If you have any problem arranging a follow-up appointment, contact the Emergency Department. If your symptoms become worse or you do not improve as expected and you are unable to reach your health care provider, please return to the Emergency Department. We are available 24 hours a day. If a prescription has been provided, please have it filled as soon as possible to prevent a delay in treatment. If you have any questions or reservations about taking the medication due to side effects or interactions with other medications, please call your primary care provider or contact the ER.

## 2023-08-18 ENCOUNTER — TELEPHONE (OUTPATIENT)
Age: 73
End: 2023-08-18

## 2023-08-18 NOTE — TELEPHONE ENCOUNTER
Josephine Valero from  [facility] needed to make a new pt appt for thick yellow discharge from penis seen [facility] [address] [facility] on 8/16 .  Patient will be on stretcher and needs to see Dr. Yodit Zhu. Need scheduled a couple days out for transportation

## 2023-08-18 NOTE — TELEPHONE ENCOUNTER
Spoke with Jeferson Viera she stated she needed something in the next week due to that date and time not being enough for transportation

## 2023-08-21 NOTE — TELEPHONE ENCOUNTER
Tried to call danae back.  She was off today and I spoke with someone else but then was hung up on trying to offer appt

## 2023-09-01 ENCOUNTER — OFFICE VISIT (OUTPATIENT)
Age: 73
End: 2023-09-01
Payer: MEDICARE

## 2023-09-01 VITALS
BODY MASS INDEX: 33.32 KG/M2 | WEIGHT: 200 LBS | SYSTOLIC BLOOD PRESSURE: 162 MMHG | DIASTOLIC BLOOD PRESSURE: 94 MMHG | TEMPERATURE: 92 F | RESPIRATION RATE: 18 BRPM | OXYGEN SATURATION: 97 % | HEIGHT: 65 IN | HEART RATE: 95 BPM

## 2023-09-01 DIAGNOSIS — E13.621: ICD-10-CM

## 2023-09-01 DIAGNOSIS — L97.425: ICD-10-CM

## 2023-09-01 DIAGNOSIS — I73.9 PERIPHERAL VASCULAR DISEASE (HCC): Primary | ICD-10-CM

## 2023-09-01 PROCEDURE — G8427 DOCREV CUR MEDS BY ELIG CLIN: HCPCS | Performed by: SURGERY

## 2023-09-01 PROCEDURE — G8417 CALC BMI ABV UP PARAM F/U: HCPCS | Performed by: SURGERY

## 2023-09-01 PROCEDURE — 3078F DIAST BP <80 MM HG: CPT | Performed by: SURGERY

## 2023-09-01 PROCEDURE — 3017F COLORECTAL CA SCREEN DOC REV: CPT | Performed by: SURGERY

## 2023-09-01 PROCEDURE — 3046F HEMOGLOBIN A1C LEVEL >9.0%: CPT | Performed by: SURGERY

## 2023-09-01 PROCEDURE — 3074F SYST BP LT 130 MM HG: CPT | Performed by: SURGERY

## 2023-09-01 PROCEDURE — 1036F TOBACCO NON-USER: CPT | Performed by: SURGERY

## 2023-09-01 PROCEDURE — 99213 OFFICE O/P EST LOW 20 MIN: CPT | Performed by: SURGERY

## 2023-09-01 PROCEDURE — 1123F ACP DISCUSS/DSCN MKR DOCD: CPT | Performed by: SURGERY

## 2023-09-01 PROCEDURE — 2022F DILAT RTA XM EVC RTNOPTHY: CPT | Performed by: SURGERY

## 2023-09-01 ASSESSMENT — PATIENT HEALTH QUESTIONNAIRE - PHQ9
1. LITTLE INTEREST OR PLEASURE IN DOING THINGS: 0
SUM OF ALL RESPONSES TO PHQ QUESTIONS 1-9: 0
2. FEELING DOWN, DEPRESSED OR HOPELESS: 0
SUM OF ALL RESPONSES TO PHQ9 QUESTIONS 1 & 2: 0
SUM OF ALL RESPONSES TO PHQ QUESTIONS 1-9: 0

## 2023-09-07 ENCOUNTER — TRANSCRIBE ORDERS (OUTPATIENT)
Dept: SCHEDULING | Age: 73
End: 2023-09-07

## 2023-09-07 DIAGNOSIS — R22.1 LUMP IN NECK: Primary | ICD-10-CM

## 2023-09-08 NOTE — PROGRESS NOTES
Vascular History and Physical    Patient: Stephanie Christian  MRN: [id number]    YOB: 1950  Age: 74 y.o. Sex: male     Chief Complaint:  Chief Complaint   Patient presents with    Follow-up     Diabetic ulcer       History of Present Illness: Stephanie Christian is a 74 y.o. pleasant gentleman who is paraplegic is here today recheck left heel area. Patient developed a pressure ulcer on the left heel. Last time when I checked the wound appears to be healing. I recommended to continue range of motion and physical therapy for lower extremity strengthening patient also provided wound care instructions every other day. Social History:  Social Connections: Not on file       Past Medical History:  Past Medical History:   Diagnosis Date    Anemia     BPH (benign prostatic hyperplasia)     Chronic kidney disease     Diabetes ([facility])     Hypercholesteremia     Hyperlipidemia     Neurological disorder     Nonspeaking deaf     Schizophrenia [facility])        Surgical History:  No past surgical history on file. Allergies:   Allergies   Allergen Reactions    Lisinopril      Other reaction(s): Unknown (comments)  Unknown-pt unable to answer       Current Meds:  Current Outpatient Medications   Medication Sig Dispense Refill    insulin detemir (LEVEMIR) 100 UNIT/ML injection vial Inject 14 Units into the skin 2 times daily 4 each 2    metoprolol succinate (TOPROL XL) 25 MG extended release tablet Take 1 tablet by mouth 2 times daily 30 tablet 3    ascorbic acid (VITAMIN C) 500 MG tablet Take 1 tablet by mouth 2 times daily      atorvastatin (LIPITOR) 40 MG tablet Take 1 tablet by mouth daily      clopidogrel (PLAVIX) 75 MG tablet Take 1 tablet by mouth daily      dilTIAZem (TIAZAC) 180 MG extended release capsule Take 1 capsule by mouth daily      ferrous sulfate (FE TABS 325) 325 (65 Fe) MG EC tablet Take 1 tablet by mouth 2 times daily      pantoprazole (PROTONIX) 20 MG tablet Take 1 tablet by mouth daily      linagliptin

## 2023-09-13 ENCOUNTER — HOSPITAL ENCOUNTER (OUTPATIENT)
Facility: HOSPITAL | Age: 73
Discharge: HOME | End: 2023-09-16
Payer: MEDICARE

## 2023-09-13 DIAGNOSIS — R22.1 LUMP IN NECK: ICD-10-CM

## 2023-09-13 PROCEDURE — 70492 CT SFT TSUE NCK W/O & W/DYE: CPT

## 2023-09-13 PROCEDURE — 6360000004 HC RX CONTRAST MEDICATION: Performed by: NURSE PRACTITIONER

## 2023-09-13 RX ADMIN — IOPAMIDOL 100 ML: 755 INJECTION, SOLUTION INTRAVENOUS at 16:35

## 2023-09-18 ENCOUNTER — OFFICE VISIT (OUTPATIENT)
Age: 73
End: 2023-09-18
Payer: MEDICARE

## 2023-09-18 VITALS
HEART RATE: 79 BPM | SYSTOLIC BLOOD PRESSURE: 134 MMHG | HEIGHT: 65 IN | RESPIRATION RATE: 18 BRPM | BODY MASS INDEX: 33.28 KG/M2 | TEMPERATURE: 97.6 F | DIASTOLIC BLOOD PRESSURE: 81 MMHG | OXYGEN SATURATION: 97 %

## 2023-09-18 DIAGNOSIS — E13.621: ICD-10-CM

## 2023-09-18 DIAGNOSIS — L97.425: ICD-10-CM

## 2023-09-18 DIAGNOSIS — I73.9 PERIPHERAL VASCULAR DISEASE (HCC): Primary | ICD-10-CM

## 2023-09-18 PROCEDURE — 3017F COLORECTAL CA SCREEN DOC REV: CPT | Performed by: SURGERY

## 2023-09-18 PROCEDURE — 3046F HEMOGLOBIN A1C LEVEL >9.0%: CPT | Performed by: SURGERY

## 2023-09-18 PROCEDURE — 3074F SYST BP LT 130 MM HG: CPT | Performed by: SURGERY

## 2023-09-18 PROCEDURE — G8427 DOCREV CUR MEDS BY ELIG CLIN: HCPCS | Performed by: SURGERY

## 2023-09-18 PROCEDURE — G8417 CALC BMI ABV UP PARAM F/U: HCPCS | Performed by: SURGERY

## 2023-09-18 PROCEDURE — 3078F DIAST BP <80 MM HG: CPT | Performed by: SURGERY

## 2023-09-18 PROCEDURE — 1123F ACP DISCUSS/DSCN MKR DOCD: CPT | Performed by: SURGERY

## 2023-09-18 PROCEDURE — 1036F TOBACCO NON-USER: CPT | Performed by: SURGERY

## 2023-09-18 PROCEDURE — 99213 OFFICE O/P EST LOW 20 MIN: CPT | Performed by: SURGERY

## 2023-09-18 PROCEDURE — 2022F DILAT RTA XM EVC RTNOPTHY: CPT | Performed by: SURGERY

## 2023-09-18 ASSESSMENT — PATIENT HEALTH QUESTIONNAIRE - PHQ9
2. FEELING DOWN, DEPRESSED OR HOPELESS: 0
SUM OF ALL RESPONSES TO PHQ QUESTIONS 1-9: 0
SUM OF ALL RESPONSES TO PHQ QUESTIONS 1-9: 0
SUM OF ALL RESPONSES TO PHQ9 QUESTIONS 1 & 2: 0
SUM OF ALL RESPONSES TO PHQ QUESTIONS 1-9: 0
1. LITTLE INTEREST OR PLEASURE IN DOING THINGS: 0
SUM OF ALL RESPONSES TO PHQ QUESTIONS 1-9: 0

## 2023-11-06 ENCOUNTER — OFFICE VISIT (OUTPATIENT)
Age: 73
End: 2023-11-06
Payer: MEDICARE

## 2023-11-06 VITALS
OXYGEN SATURATION: 96 % | TEMPERATURE: 97.1 F | DIASTOLIC BLOOD PRESSURE: 76 MMHG | HEART RATE: 87 BPM | RESPIRATION RATE: 18 BRPM | SYSTOLIC BLOOD PRESSURE: 136 MMHG | BODY MASS INDEX: 33.28 KG/M2 | HEIGHT: 65 IN

## 2023-11-06 DIAGNOSIS — I73.9 PERIPHERAL VASCULAR DISEASE (HCC): Primary | ICD-10-CM

## 2023-11-06 PROCEDURE — 99213 OFFICE O/P EST LOW 20 MIN: CPT | Performed by: SURGERY

## 2023-11-06 PROCEDURE — 3078F DIAST BP <80 MM HG: CPT | Performed by: SURGERY

## 2023-11-06 PROCEDURE — 1123F ACP DISCUSS/DSCN MKR DOCD: CPT | Performed by: SURGERY

## 2023-11-06 PROCEDURE — G8484 FLU IMMUNIZE NO ADMIN: HCPCS | Performed by: SURGERY

## 2023-11-06 PROCEDURE — 3074F SYST BP LT 130 MM HG: CPT | Performed by: SURGERY

## 2023-11-06 PROCEDURE — G8417 CALC BMI ABV UP PARAM F/U: HCPCS | Performed by: SURGERY

## 2023-11-06 PROCEDURE — 1036F TOBACCO NON-USER: CPT | Performed by: SURGERY

## 2023-11-06 PROCEDURE — 3017F COLORECTAL CA SCREEN DOC REV: CPT | Performed by: SURGERY

## 2023-11-06 PROCEDURE — G8427 DOCREV CUR MEDS BY ELIG CLIN: HCPCS | Performed by: SURGERY

## 2023-11-09 NOTE — PROGRESS NOTES
Vascular History and Physical    Patient: Tabby Harp  MRN: [id number]    YOB: 1950  Age: 74 y.o. Sex: male     Chief Complaint:  No chief complaint on file. History of Present Illness: Tabby Harp is a 74 y.o. very pleasant gentleman is here today reassess left heel wound. Last time wound appears to be healing. Patient had previous palpable pulse on the left DP however last time I could not appreciate a palpable pulse at the Doppler signal.    Social History:  Social Connections: Not on file       Past Medical History:  Past Medical History:   Diagnosis Date    Anemia     BPH (benign prostatic hyperplasia)     Chronic kidney disease     Diabetes ([address] [facility])     Hypercholesteremia     Hyperlipidemia     Neurological disorder     Nonspeaking deaf     Schizophrenia [facility])        Surgical History:  No past surgical history on file. Allergies:   Allergies   Allergen Reactions    Lisinopril      Other reaction(s): Unknown (comments)  Unknown-pt unable to answer       Current Meds:  Current Outpatient Medications   Medication Sig Dispense Refill    insulin detemir (LEVEMIR) 100 UNIT/ML injection vial Inject 14 Units into the skin 2 times daily 4 each 2    metoprolol succinate (TOPROL XL) 25 MG extended release tablet Take 1 tablet by mouth 2 times daily 30 tablet 3    ascorbic acid (VITAMIN C) 500 MG tablet Take 1 tablet by mouth 2 times daily      atorvastatin (LIPITOR) 40 MG tablet Take 1 tablet by mouth daily      clopidogrel (PLAVIX) 75 MG tablet Take 1 tablet by mouth daily      dilTIAZem (TIAZAC) 180 MG extended release capsule Take 1 capsule by mouth daily      ferrous sulfate (FE TABS 325) 325 (65 Fe) MG EC tablet Take 1 tablet by mouth 2 times daily      pantoprazole (PROTONIX) 20 MG tablet Take 1 tablet by mouth daily      linagliptin (TRADJENTA) 5 MG tablet Take 1 tablet by mouth daily (Patient not taking: Reported on 9/18/2023) 30 tablet 2    bethanechol (URECHOLINE) 25 MG tablet Take 1

## 2023-11-20 ENCOUNTER — OFFICE VISIT (OUTPATIENT)
Age: 73
End: 2023-11-20
Payer: MEDICARE

## 2023-11-20 VITALS
RESPIRATION RATE: 16 BRPM | BODY MASS INDEX: 33.28 KG/M2 | DIASTOLIC BLOOD PRESSURE: 66 MMHG | WEIGHT: 200 LBS | TEMPERATURE: 98.7 F | OXYGEN SATURATION: 98 % | SYSTOLIC BLOOD PRESSURE: 144 MMHG | HEART RATE: 83 BPM

## 2023-11-20 DIAGNOSIS — E13.621: ICD-10-CM

## 2023-11-20 DIAGNOSIS — I73.9 PERIPHERAL VASCULAR DISEASE (HCC): Primary | ICD-10-CM

## 2023-11-20 DIAGNOSIS — L97.509 FOOT ULCER DUE TO SECONDARY DM (HCC): ICD-10-CM

## 2023-11-20 DIAGNOSIS — E13.621 FOOT ULCER DUE TO SECONDARY DM (HCC): ICD-10-CM

## 2023-11-20 DIAGNOSIS — L97.425: ICD-10-CM

## 2023-11-20 PROCEDURE — G8427 DOCREV CUR MEDS BY ELIG CLIN: HCPCS | Performed by: SURGERY

## 2023-11-20 PROCEDURE — 3074F SYST BP LT 130 MM HG: CPT | Performed by: SURGERY

## 2023-11-20 PROCEDURE — 3078F DIAST BP <80 MM HG: CPT | Performed by: SURGERY

## 2023-11-20 PROCEDURE — 2022F DILAT RTA XM EVC RTNOPTHY: CPT | Performed by: SURGERY

## 2023-11-20 PROCEDURE — 1036F TOBACCO NON-USER: CPT | Performed by: SURGERY

## 2023-11-20 PROCEDURE — 99213 OFFICE O/P EST LOW 20 MIN: CPT | Performed by: SURGERY

## 2023-11-20 PROCEDURE — G8417 CALC BMI ABV UP PARAM F/U: HCPCS | Performed by: SURGERY

## 2023-11-20 PROCEDURE — 1123F ACP DISCUSS/DSCN MKR DOCD: CPT | Performed by: SURGERY

## 2023-11-20 PROCEDURE — 3017F COLORECTAL CA SCREEN DOC REV: CPT | Performed by: SURGERY

## 2023-11-20 PROCEDURE — 3046F HEMOGLOBIN A1C LEVEL >9.0%: CPT | Performed by: SURGERY

## 2023-11-20 PROCEDURE — G8484 FLU IMMUNIZE NO ADMIN: HCPCS | Performed by: SURGERY

## 2023-12-21 ENCOUNTER — TELEPHONE (OUTPATIENT)
Age: 73
End: 2023-12-21

## 2023-12-21 NOTE — TELEPHONE ENCOUNTER
Patient surgery 12/12/23 cancelled due to lack of transportation. Patient is needing to reschedule surgery with Dr. Woods. Please call patient back at [phone].

## 2024-01-10 ENCOUNTER — TELEPHONE (OUTPATIENT)
Age: 74
End: 2024-01-10

## 2024-01-10 NOTE — TELEPHONE ENCOUNTER
Spoke with the sister of the patient who called and she was a little confused on the dates and times of his next two appointments, I clarified that the surgery on the 16th was scheduled for 2 but he shouold get there at least an hour early just to be prepared and that the followup was at the office and that was at 9 am, she understood and wrote the information down and asked that I notify the nursing home so the transportation could be scheduled so I Spoke kemi malin at the [facility] and gave her all the same information and she understood and stated she would pass it on.

## 2024-01-10 NOTE — TELEPHONE ENCOUNTER
Shefali the patient's sister called regarding upcoming appointments. He is having an agiogram scheduled for 1/16/24 and a follow up appointment in office for a wound on 1/18/24. Does he need to come into that appointment since Dr. Woods will see him a hospital? Please called Shefali at [phone].

## 2024-01-16 ENCOUNTER — TELEPHONE (OUTPATIENT)
Age: 74
End: 2024-01-16

## 2024-01-16 NOTE — TELEPHONE ENCOUNTER
Dyan Al (appt coordinator at [facility]) called in and stated that patient is not going to go to surgery for his Angiography lower ext left this morning because he was not supposed to be on plavix for 7days within surgery but took it this morning. Caller wants to get another surgery schedule date for patient. Please call caller back at [phone] ext. [phone].

## 2024-01-17 ENCOUNTER — TELEPHONE (OUTPATIENT)
Age: 74
End: 2024-01-17

## 2024-01-17 NOTE — TELEPHONE ENCOUNTER
Identified patient with two identifiers. Spoke with Shefali informing her Mikaela from [facility] and rehab called yesterday to cancel patient's surgery due to him not stopping Plavix 7 days prior. Informed her I would discuss with Dr. Woods and follow up.    Perfect serve has been sent to provider

## 2024-01-17 NOTE — TELEPHONE ENCOUNTER
Calldada Islas (sister) called wanting to verify if Dr. Woods still needed appt with patient due to not having surgery and wanted to know the reason why surgery was cancelled. Caller stated would like to do conference call with her number [phone] and nursing [facility] at [phone] because of all the conflicting information about patient's appt scheduling.

## 2024-01-18 NOTE — TELEPHONE ENCOUNTER
Calldada Islas (sister) called and wants to know what is going on and why surgery is taking so long to schedule, caller stated that patient's health is critical and that patient is deaf and mute and is needing to be taken care of.     She would like a conference call done with Mikaela ([phone]) and herself so there is no miscommunication on what is being scheduled and done for patient and  Caller provided call back number for herself as [phone] and stated that even though she would prefer to be spoken to in a conference call she will be okay being called after nursing home nurse Mikaela has been spoken with because she does not want to be the hold up or delay in patient's surgery.     Explained to caller that did show message was sent to provider and that she is waiting for a surgery date to be given but that she should be hearing soon from surgery scheduler.

## 2024-01-19 NOTE — TELEPHONE ENCOUNTER
Talked to patient's sister regarding surgery with Dr. Woods. Informed her that I sent a message to Dr. Woods and waiting for him to send a posting. She stated that she was upset because of the lack of the communication between the nursing home and the hospital. Informed her that once I received a posting, we can do a conference call so there wouldn't be any confusion.

## 2024-01-22 ENCOUNTER — TELEPHONE (OUTPATIENT)
Age: 74
End: 2024-01-22

## 2024-01-22 NOTE — TELEPHONE ENCOUNTER
Michaela from [facility] called regarding surgery. She needs to know the date of his up coming surgery. Please call her [phone]

## 2024-01-23 NOTE — TELEPHONE ENCOUNTER
Talked to Michaela and informed her that once I get information from Dr. Woods, I'll call to schedule.

## 2024-01-30 ENCOUNTER — HOSPITAL ENCOUNTER (OUTPATIENT)
Facility: HOSPITAL | Age: 74
Setting detail: OBSERVATION
End: 2024-01-31
Attending: SURGERY | Admitting: SURGERY
Payer: MEDICARE

## 2024-01-30 DIAGNOSIS — I73.9 PVD (PERIPHERAL VASCULAR DISEASE) (HCC): ICD-10-CM

## 2024-01-30 PROBLEM — N28.9 RENAL INSUFFICIENCY: Status: ACTIVE | Noted: 2024-01-30

## 2024-01-30 PROBLEM — I70.202 FEMORAL ARTERY STENOSIS, LEFT (HCC): Status: ACTIVE | Noted: 2024-01-30

## 2024-01-30 LAB
ALBUMIN SERPL-MCNC: 3.2 G/DL (ref 3.5–5)
ALBUMIN/GLOB SERPL: 0.6 (ref 1.1–2.2)
ALP SERPL-CCNC: 134 U/L (ref 45–117)
ALT SERPL-CCNC: 21 U/L (ref 12–78)
ANION GAP SERPL CALC-SCNC: 4 MMOL/L (ref 5–15)
AST SERPL W P-5'-P-CCNC: 13 U/L (ref 15–37)
BASOPHILS # BLD: 0 K/UL (ref 0–0.1)
BASOPHILS NFR BLD: 0 % (ref 0–1)
BILIRUB SERPL-MCNC: 0.4 MG/DL (ref 0.2–1)
BUN SERPL-MCNC: 20 MG/DL (ref 6–20)
BUN/CREAT SERPL: 15 (ref 12–20)
CA-I BLD-MCNC: 9.1 MG/DL (ref 8.5–10.1)
CHLORIDE SERPL-SCNC: 106 MMOL/L (ref 97–108)
CO2 SERPL-SCNC: 26 MMOL/L (ref 21–32)
CREAT SERPL-MCNC: 1.36 MG/DL (ref 0.7–1.3)
DIFFERENTIAL METHOD BLD: ABNORMAL
ECHO BSA: 1.95 M2
EOSINOPHIL # BLD: 0.5 K/UL (ref 0–0.4)
EOSINOPHIL NFR BLD: 7 % (ref 0–7)
ERYTHROCYTE [DISTWIDTH] IN BLOOD BY AUTOMATED COUNT: 15.9 % (ref 11.5–14.5)
GLOBULIN SER CALC-MCNC: 5.3 G/DL (ref 2–4)
GLUCOSE BLD STRIP.AUTO-MCNC: 116 MG/DL (ref 65–100)
GLUCOSE BLD STRIP.AUTO-MCNC: 122 MG/DL (ref 65–100)
GLUCOSE BLD STRIP.AUTO-MCNC: 135 MG/DL (ref 65–100)
GLUCOSE BLD STRIP.AUTO-MCNC: 199 MG/DL (ref 65–100)
GLUCOSE SERPL-MCNC: 138 MG/DL (ref 65–100)
HCT VFR BLD AUTO: 38.1 % (ref 36.6–50.3)
HGB BLD-MCNC: 12 G/DL (ref 12.1–17)
IMM GRANULOCYTES # BLD AUTO: 0 K/UL (ref 0–0.04)
IMM GRANULOCYTES NFR BLD AUTO: 0 % (ref 0–0.5)
LYMPHOCYTES # BLD: 1.7 K/UL (ref 0.8–3.5)
LYMPHOCYTES NFR BLD: 25 % (ref 12–49)
MCH RBC QN AUTO: 27.5 PG (ref 26–34)
MCHC RBC AUTO-ENTMCNC: 31.5 G/DL (ref 30–36.5)
MCV RBC AUTO: 87.2 FL (ref 80–99)
MONOCYTES # BLD: 0.9 K/UL (ref 0–1)
MONOCYTES NFR BLD: 13 % (ref 5–13)
NEUTS SEG # BLD: 3.8 K/UL (ref 1.8–8)
NEUTS SEG NFR BLD: 55 % (ref 32–75)
NRBC # BLD: 0 K/UL (ref 0–0.01)
NRBC BLD-RTO: 0 PER 100 WBC
PERFORMED BY:: ABNORMAL
PLATELET # BLD AUTO: 226 K/UL (ref 150–400)
PMV BLD AUTO: 11.8 FL (ref 8.9–12.9)
POTASSIUM SERPL-SCNC: 4.3 MMOL/L (ref 3.5–5.1)
PROT SERPL-MCNC: 8.5 G/DL (ref 6.4–8.2)
RBC # BLD AUTO: 4.37 M/UL (ref 4.1–5.7)
SODIUM SERPL-SCNC: 136 MMOL/L (ref 136–145)
WBC # BLD AUTO: 7 K/UL (ref 4.1–11.1)

## 2024-01-30 PROCEDURE — G0378 HOSPITAL OBSERVATION PER HR: HCPCS

## 2024-01-30 PROCEDURE — 7100000001 HC PACU RECOVERY - ADDTL 15 MIN: Performed by: SURGERY

## 2024-01-30 PROCEDURE — 2500000003 HC RX 250 WO HCPCS: Performed by: SURGERY

## 2024-01-30 PROCEDURE — 96365 THER/PROPH/DIAG IV INF INIT: CPT

## 2024-01-30 PROCEDURE — 87070 CULTURE OTHR SPECIMN AEROBIC: CPT

## 2024-01-30 PROCEDURE — 6360000002 HC RX W HCPCS: Performed by: SURGERY

## 2024-01-30 PROCEDURE — 2709999900 HC NON-CHARGEABLE SUPPLY: Performed by: SURGERY

## 2024-01-30 PROCEDURE — 96375 TX/PRO/DX INJ NEW DRUG ADDON: CPT

## 2024-01-30 PROCEDURE — C1769 GUIDE WIRE: HCPCS | Performed by: SURGERY

## 2024-01-30 PROCEDURE — 2580000003 HC RX 258: Performed by: SURGERY

## 2024-01-30 PROCEDURE — C1894 INTRO/SHEATH, NON-LASER: HCPCS | Performed by: SURGERY

## 2024-01-30 PROCEDURE — 75710 ARTERY X-RAYS ARM/LEG: CPT | Performed by: SURGERY

## 2024-01-30 PROCEDURE — C1887 CATHETER, GUIDING: HCPCS | Performed by: SURGERY

## 2024-01-30 PROCEDURE — 6370000000 HC RX 637 (ALT 250 FOR IP): Performed by: SURGERY

## 2024-01-30 PROCEDURE — 36245 INS CATH ABD/L-EXT ART 1ST: CPT | Performed by: SURGERY

## 2024-01-30 PROCEDURE — 37224 PR REVSC OPN/PRG FEM/POP W/ANGIOPLASTY UNI: CPT | Performed by: SURGERY

## 2024-01-30 PROCEDURE — 96366 THER/PROPH/DIAG IV INF ADDON: CPT

## 2024-01-30 PROCEDURE — 80053 COMPREHEN METABOLIC PANEL: CPT

## 2024-01-30 PROCEDURE — C2623 CATH, TRANSLUMIN, DRUG-COAT: HCPCS | Performed by: SURGERY

## 2024-01-30 PROCEDURE — 87205 SMEAR GRAM STAIN: CPT

## 2024-01-30 PROCEDURE — 99153 MOD SED SAME PHYS/QHP EA: CPT | Performed by: SURGERY

## 2024-01-30 PROCEDURE — 99222 1ST HOSP IP/OBS MODERATE 55: CPT | Performed by: SURGERY

## 2024-01-30 PROCEDURE — 82962 GLUCOSE BLOOD TEST: CPT

## 2024-01-30 PROCEDURE — 99152 MOD SED SAME PHYS/QHP 5/>YRS: CPT | Performed by: SURGERY

## 2024-01-30 PROCEDURE — 6360000004 HC RX CONTRAST MEDICATION: Performed by: SURGERY

## 2024-01-30 PROCEDURE — 75625 CONTRAST EXAM ABDOMINL AORTA: CPT | Performed by: SURGERY

## 2024-01-30 PROCEDURE — 76937 US GUIDE VASCULAR ACCESS: CPT | Performed by: SURGERY

## 2024-01-30 PROCEDURE — 85025 COMPLETE CBC W/AUTO DIFF WBC: CPT

## 2024-01-30 PROCEDURE — C1760 CLOSURE DEV, VASC: HCPCS | Performed by: SURGERY

## 2024-01-30 PROCEDURE — 7100000000 HC PACU RECOVERY - FIRST 15 MIN: Performed by: SURGERY

## 2024-01-30 PROCEDURE — 37224 HC FEM POP TERRITORY PLASTY: CPT | Performed by: SURGERY

## 2024-01-30 RX ORDER — LIDOCAINE HYDROCHLORIDE 20 MG/ML
INJECTION, SOLUTION INFILTRATION; PERINEURAL PRN
Status: DISCONTINUED | OUTPATIENT
Start: 2024-01-30 | End: 2024-01-30 | Stop reason: HOSPADM

## 2024-01-30 RX ORDER — POTASSIUM CHLORIDE 20 MEQ/1
40 TABLET, EXTENDED RELEASE ORAL PRN
Status: DISCONTINUED | OUTPATIENT
Start: 2024-01-30 | End: 2024-01-31 | Stop reason: HOSPADM

## 2024-01-30 RX ORDER — ONDANSETRON 2 MG/ML
4 INJECTION INTRAMUSCULAR; INTRAVENOUS EVERY 6 HOURS PRN
Status: DISCONTINUED | OUTPATIENT
Start: 2024-01-30 | End: 2024-01-31 | Stop reason: HOSPADM

## 2024-01-30 RX ORDER — POTASSIUM CHLORIDE 7.45 MG/ML
10 INJECTION INTRAVENOUS PRN
Status: DISCONTINUED | OUTPATIENT
Start: 2024-01-30 | End: 2024-01-31 | Stop reason: HOSPADM

## 2024-01-30 RX ORDER — TAMSULOSIN HYDROCHLORIDE 0.4 MG/1
0.4 CAPSULE ORAL DAILY
Status: DISCONTINUED | OUTPATIENT
Start: 2024-01-30 | End: 2024-01-31 | Stop reason: HOSPADM

## 2024-01-30 RX ORDER — NYSTATIN 100000 U/G
OINTMENT TOPICAL 2 TIMES DAILY
Status: DISCONTINUED | OUTPATIENT
Start: 2024-01-30 | End: 2024-01-31 | Stop reason: HOSPADM

## 2024-01-30 RX ORDER — MIDAZOLAM HYDROCHLORIDE 1 MG/ML
INJECTION INTRAMUSCULAR; INTRAVENOUS PRN
Status: DISCONTINUED | OUTPATIENT
Start: 2024-01-30 | End: 2024-01-30 | Stop reason: HOSPADM

## 2024-01-30 RX ORDER — ACETAMINOPHEN 325 MG/1
650 TABLET ORAL EVERY 4 HOURS PRN
Status: DISCONTINUED | OUTPATIENT
Start: 2024-01-30 | End: 2024-01-31 | Stop reason: HOSPADM

## 2024-01-30 RX ORDER — DEXTROSE MONOHYDRATE 100 MG/ML
INJECTION, SOLUTION INTRAVENOUS CONTINUOUS PRN
Status: DISCONTINUED | OUTPATIENT
Start: 2024-01-30 | End: 2024-01-31 | Stop reason: HOSPADM

## 2024-01-30 RX ORDER — SODIUM CHLORIDE 0.9 % (FLUSH) 0.9 %
5-40 SYRINGE (ML) INJECTION PRN
Status: DISCONTINUED | OUTPATIENT
Start: 2024-01-30 | End: 2024-01-31 | Stop reason: HOSPADM

## 2024-01-30 RX ORDER — INSULIN LISPRO 100 [IU]/ML
0-4 INJECTION, SOLUTION INTRAVENOUS; SUBCUTANEOUS
Status: DISCONTINUED | OUTPATIENT
Start: 2024-01-30 | End: 2024-01-31 | Stop reason: HOSPADM

## 2024-01-30 RX ORDER — SODIUM CHLORIDE 0.9 % (FLUSH) 0.9 %
5-40 SYRINGE (ML) INJECTION EVERY 12 HOURS SCHEDULED
Status: DISCONTINUED | OUTPATIENT
Start: 2024-01-30 | End: 2024-01-31 | Stop reason: HOSPADM

## 2024-01-30 RX ORDER — LANOLIN ALCOHOL/MO/W.PET/CERES
325 CREAM (GRAM) TOPICAL 2 TIMES DAILY
Status: DISCONTINUED | OUTPATIENT
Start: 2024-01-30 | End: 2024-01-30

## 2024-01-30 RX ORDER — DIPHENHYDRAMINE HYDROCHLORIDE 50 MG/ML
INJECTION INTRAMUSCULAR; INTRAVENOUS PRN
Status: DISCONTINUED | OUTPATIENT
Start: 2024-01-30 | End: 2024-01-30 | Stop reason: HOSPADM

## 2024-01-30 RX ORDER — POLYETHYLENE GLYCOL 3350 17 G/17G
17 POWDER, FOR SOLUTION ORAL DAILY PRN
Status: DISCONTINUED | OUTPATIENT
Start: 2024-01-30 | End: 2024-01-31 | Stop reason: HOSPADM

## 2024-01-30 RX ORDER — METOPROLOL SUCCINATE 25 MG/1
25 TABLET, EXTENDED RELEASE ORAL 2 TIMES DAILY
Status: DISCONTINUED | OUTPATIENT
Start: 2024-01-30 | End: 2024-01-31 | Stop reason: HOSPADM

## 2024-01-30 RX ORDER — HEPARIN SODIUM 200 [USP'U]/100ML
INJECTION, SOLUTION INTRAVENOUS CONTINUOUS PRN
Status: COMPLETED | OUTPATIENT
Start: 2024-01-30 | End: 2024-01-30

## 2024-01-30 RX ORDER — FERROUS SULFATE 325(65) MG
325 TABLET ORAL 2 TIMES DAILY WITH MEALS
Status: DISCONTINUED | OUTPATIENT
Start: 2024-01-30 | End: 2024-01-31 | Stop reason: HOSPADM

## 2024-01-30 RX ORDER — INSULIN LISPRO 100 [IU]/ML
0-4 INJECTION, SOLUTION INTRAVENOUS; SUBCUTANEOUS NIGHTLY
Status: DISCONTINUED | OUTPATIENT
Start: 2024-01-30 | End: 2024-01-31 | Stop reason: HOSPADM

## 2024-01-30 RX ORDER — INSULIN GLARGINE 100 [IU]/ML
14 INJECTION, SOLUTION SUBCUTANEOUS 2 TIMES DAILY
Status: DISCONTINUED | OUTPATIENT
Start: 2024-01-30 | End: 2024-01-31 | Stop reason: HOSPADM

## 2024-01-30 RX ORDER — ACETAMINOPHEN 650 MG/1
650 SUPPOSITORY RECTAL EVERY 6 HOURS PRN
Status: DISCONTINUED | OUTPATIENT
Start: 2024-01-30 | End: 2024-01-31 | Stop reason: HOSPADM

## 2024-01-30 RX ORDER — HEPARIN SODIUM 1000 [USP'U]/ML
INJECTION, SOLUTION INTRAVENOUS; SUBCUTANEOUS PRN
Status: DISCONTINUED | OUTPATIENT
Start: 2024-01-30 | End: 2024-01-30 | Stop reason: HOSPADM

## 2024-01-30 RX ORDER — ACETAMINOPHEN 325 MG/1
650 TABLET ORAL EVERY 6 HOURS PRN
Status: DISCONTINUED | OUTPATIENT
Start: 2024-01-30 | End: 2024-01-31 | Stop reason: HOSPADM

## 2024-01-30 RX ORDER — SODIUM CHLORIDE 0.9 % (FLUSH) 0.9 %
5-40 SYRINGE (ML) INJECTION PRN
Status: DISCONTINUED | OUTPATIENT
Start: 2024-01-30 | End: 2024-01-30 | Stop reason: HOSPADM

## 2024-01-30 RX ORDER — ATORVASTATIN CALCIUM 40 MG/1
40 TABLET, FILM COATED ORAL DAILY
Status: DISCONTINUED | OUTPATIENT
Start: 2024-01-30 | End: 2024-01-31 | Stop reason: HOSPADM

## 2024-01-30 RX ORDER — SODIUM CHLORIDE 9 MG/ML
INJECTION, SOLUTION INTRAVENOUS CONTINUOUS
Status: DISCONTINUED | OUTPATIENT
Start: 2024-01-30 | End: 2024-01-31 | Stop reason: HOSPADM

## 2024-01-30 RX ORDER — SODIUM CHLORIDE 9 MG/ML
INJECTION, SOLUTION INTRAVENOUS PRN
Status: DISCONTINUED | OUTPATIENT
Start: 2024-01-30 | End: 2024-01-30 | Stop reason: HOSPADM

## 2024-01-30 RX ORDER — SODIUM CHLORIDE 9 MG/ML
INJECTION, SOLUTION INTRAVENOUS PRN
Status: DISCONTINUED | OUTPATIENT
Start: 2024-01-30 | End: 2024-01-31 | Stop reason: HOSPADM

## 2024-01-30 RX ORDER — IODIXANOL 320 MG/ML
INJECTION, SOLUTION INTRAVASCULAR PRN
Status: DISCONTINUED | OUTPATIENT
Start: 2024-01-30 | End: 2024-01-30 | Stop reason: HOSPADM

## 2024-01-30 RX ORDER — ONDANSETRON 4 MG/1
4 TABLET, ORALLY DISINTEGRATING ORAL EVERY 8 HOURS PRN
Status: DISCONTINUED | OUTPATIENT
Start: 2024-01-30 | End: 2024-01-31 | Stop reason: HOSPADM

## 2024-01-30 RX ORDER — DILTIAZEM HYDROCHLORIDE 180 MG/1
180 CAPSULE, EXTENDED RELEASE ORAL DAILY
Status: DISCONTINUED | OUTPATIENT
Start: 2024-01-30 | End: 2024-01-31 | Stop reason: HOSPADM

## 2024-01-30 RX ORDER — MAGNESIUM SULFATE IN WATER 40 MG/ML
2000 INJECTION, SOLUTION INTRAVENOUS PRN
Status: DISCONTINUED | OUTPATIENT
Start: 2024-01-30 | End: 2024-01-31 | Stop reason: HOSPADM

## 2024-01-30 RX ORDER — SODIUM CHLORIDE 0.9 % (FLUSH) 0.9 %
5-40 SYRINGE (ML) INJECTION EVERY 12 HOURS SCHEDULED
Status: DISCONTINUED | OUTPATIENT
Start: 2024-01-30 | End: 2024-01-30 | Stop reason: HOSPADM

## 2024-01-30 RX ORDER — GLUCAGON 1 MG/ML
1 KIT INJECTION PRN
Status: DISCONTINUED | OUTPATIENT
Start: 2024-01-30 | End: 2024-01-31 | Stop reason: HOSPADM

## 2024-01-30 RX ORDER — ASCORBIC ACID 500 MG
500 TABLET ORAL 2 TIMES DAILY
Status: DISCONTINUED | OUTPATIENT
Start: 2024-01-30 | End: 2024-01-31 | Stop reason: HOSPADM

## 2024-01-30 RX ORDER — CLOPIDOGREL BISULFATE 75 MG/1
75 TABLET ORAL DAILY
Status: DISCONTINUED | OUTPATIENT
Start: 2024-01-30 | End: 2024-01-31 | Stop reason: HOSPADM

## 2024-01-30 RX ORDER — FENTANYL CITRATE 50 UG/ML
INJECTION, SOLUTION INTRAMUSCULAR; INTRAVENOUS PRN
Status: DISCONTINUED | OUTPATIENT
Start: 2024-01-30 | End: 2024-01-30 | Stop reason: HOSPADM

## 2024-01-30 RX ORDER — DILTIAZEM HYDROCHLORIDE 180 MG/1
180 CAPSULE, EXTENDED RELEASE ORAL DAILY
Status: DISCONTINUED | OUTPATIENT
Start: 2024-01-30 | End: 2024-01-30

## 2024-01-30 RX ORDER — PANTOPRAZOLE SODIUM 20 MG/1
20 TABLET, DELAYED RELEASE ORAL DAILY
Status: DISCONTINUED | OUTPATIENT
Start: 2024-01-30 | End: 2024-01-31 | Stop reason: HOSPADM

## 2024-01-30 RX ADMIN — SODIUM BICARBONATE: 84 INJECTION, SOLUTION INTRAVENOUS at 22:58

## 2024-01-30 RX ADMIN — SODIUM CHLORIDE, PRESERVATIVE FREE 10 ML: 5 INJECTION INTRAVENOUS at 21:31

## 2024-01-30 RX ADMIN — METOPROLOL SUCCINATE 25 MG: 25 TABLET, EXTENDED RELEASE ORAL at 18:04

## 2024-01-30 RX ADMIN — TAMSULOSIN HYDROCHLORIDE 0.4 MG: 0.4 CAPSULE ORAL at 18:03

## 2024-01-30 RX ADMIN — CLOPIDOGREL BISULFATE 75 MG: 75 TABLET ORAL at 18:03

## 2024-01-30 RX ADMIN — INSULIN GLARGINE 14 UNITS: 100 INJECTION, SOLUTION SUBCUTANEOUS at 21:42

## 2024-01-30 RX ADMIN — OXYCODONE HYDROCHLORIDE AND ACETAMINOPHEN 500 MG: 500 TABLET ORAL at 18:04

## 2024-01-30 RX ADMIN — FERROUS SULFATE TAB 325 MG (65 MG ELEMENTAL FE) 325 MG: 325 (65 FE) TAB at 18:22

## 2024-01-30 RX ADMIN — DILTIAZEM HYDROCHLORIDE 180 MG: 180 CAPSULE, EXTENDED RELEASE ORAL at 18:04

## 2024-01-30 RX ADMIN — NYSTATIN OINTMENT: 100000 OINTMENT TOPICAL at 21:30

## 2024-01-30 RX ADMIN — SODIUM CHLORIDE: 9 INJECTION, SOLUTION INTRAVENOUS at 20:00

## 2024-01-30 RX ADMIN — SODIUM CHLORIDE, PRESERVATIVE FREE 10 ML: 5 INJECTION INTRAVENOUS at 21:32

## 2024-01-30 RX ADMIN — SODIUM BICARBONATE: 84 INJECTION, SOLUTION INTRAVENOUS at 12:51

## 2024-01-30 RX ADMIN — EMPAGLIFLOZIN 10 MG: 10 TABLET, FILM COATED ORAL at 18:04

## 2024-01-30 RX ADMIN — ATORVASTATIN CALCIUM 40 MG: 40 TABLET, FILM COATED ORAL at 18:03

## 2024-01-30 RX ADMIN — PANTOPRAZOLE SODIUM 20 MG: 20 TABLET, DELAYED RELEASE ORAL at 18:04

## 2024-01-30 ASSESSMENT — PAIN SCALES - WONG BAKER: WONGBAKER_NUMERICALRESPONSE: 0

## 2024-01-30 ASSESSMENT — PAIN - FUNCTIONAL ASSESSMENT: PAIN_FUNCTIONAL_ASSESSMENT: ADULT NONVERBAL PAIN SCALE (NPVS)

## 2024-01-30 NOTE — PERIOP NOTE
In to discuss consent process with patient and family. Patient is alert, pleasant, smiles at conversation. Family is present at bedside, sister (PM)  is overseeing his care at this time. The patients other sister had POA at one time per this sister however her health has declined and she is no longer able to serve in that role so the responsibilities have shifted.   Attempted use of [facility]  for ASL, patient not able to participate. Sister feels like he may have not be as comfortable signing as he once was because no one is using that for him currently.   Explained that without us being able to validate and verify he understands the risk, benefits and need for procedure today we would need her to sign for him, she is agreeable to do that.   Reviewed with Risk and agreed with plan.

## 2024-01-30 NOTE — H&P
Vascular History and Physical     Patient: Mark Anthony Ruiz             MRN: [id number]          YOB: 1950        Age: 73 y.o.     Sex: male      Chief Complaint:          Chief Complaint   Patient presents with    Follow-up       Left leg     Wound Check         History of Present Illness: Mark Anthony Ruiz is a 73 y.o. very pleasant gentleman is here today to recheck left heel ulcer.  Patient is accompanied by his sister.  Patient was recommended continue PT therapy strengthening range of motion bilateral lower extremity at nursing home.  Also recommended Mepilex and bunny boot application.     Social History:  Social Connections: Not on file         Past Medical History:  Past Medical History           Past Medical History:   Diagnosis Date    Anemia      BPH (benign prostatic hyperplasia)      Chronic kidney disease      Diabetes (HCC)      Hypercholesteremia      Hyperlipidemia      Neurological disorder      Nonspeaking deaf      Schizophrenia (HCC)              Surgical History:  Past Surgical History   No past surgical history on file.         Allergies:            Allergies   Allergen Reactions    Lisinopril         Other reaction(s): Unknown (comments)  Unknown-pt unable to answer         Current Meds:  Current Facility-Administered Medications               Current Outpatient Medications   Medication Sig Dispense Refill    TRULICITY 1.5 MG/0.5ML SC injection          JARDIANCE 10 MG tablet          JANUVIA 100 MG tablet          insulin aspart (NOVOLOG) 100 UNIT/ML injection vial Inject into the skin 3 times daily        NYSTATIN IN AQUAPHOR OINTMENT Apply topically 2 times daily        ergocalciferol (ERGOCALCIFEROL) 1.25 MG (75353 UT) capsule Take 1 capsule by mouth once a week        insulin detemir (LEVEMIR) 100 UNIT/ML injection vial Inject 14 Units into the skin 2 times daily 4 each 2    metoprolol succinate (TOPROL XL) 25 MG extended release tablet Take 1 tablet by mouth 2 times daily 30

## 2024-01-30 NOTE — CONSULTS
Session ID: [id number]  Language: ASL   ID: #[id number]   Name: Vijay      Pt unable to communicate with . Pt able to point to a piece of paper regarding the left leg procedure. Pt able to sign consent and his sister Shefali Worrell also signed with patient regarding consent.     0815: This RN and CRYSTAL Grayson changed pts soiled gown, sheets, and brief. Upon turning pt, pt has multiple sores and wounds on sacrum and buttock. As well as between legs and on backside of legs. Zinc cream was applied on reddened areas with breakdown.     0845: [facility] on [facility]. Sallie Lara, nurse gave updated information. NPO since dinner 01/29/24, no medications were given this morning. Given updated height and weight. She states he is able to comprehend what you are saying but is just non verbal. Pt is not communicating with us other than hand gestures and audible sounds. Pt chart states he is deaf.    1205: Put in wound care consult per CRYSTAL Tamayo

## 2024-01-31 VITALS
BODY MASS INDEX: 30.57 KG/M2 | OXYGEN SATURATION: 96 % | TEMPERATURE: 97.4 F | HEIGHT: 65 IN | DIASTOLIC BLOOD PRESSURE: 73 MMHG | HEART RATE: 90 BPM | WEIGHT: 183.5 LBS | SYSTOLIC BLOOD PRESSURE: 136 MMHG | RESPIRATION RATE: 18 BRPM

## 2024-01-31 LAB
GLUCOSE BLD STRIP.AUTO-MCNC: 158 MG/DL (ref 65–100)
GLUCOSE BLD STRIP.AUTO-MCNC: 98 MG/DL (ref 65–100)
PERFORMED BY:: ABNORMAL
PERFORMED BY:: NORMAL

## 2024-01-31 PROCEDURE — G0378 HOSPITAL OBSERVATION PER HR: HCPCS

## 2024-01-31 PROCEDURE — 82962 GLUCOSE BLOOD TEST: CPT

## 2024-01-31 PROCEDURE — 6370000000 HC RX 637 (ALT 250 FOR IP): Performed by: SURGERY

## 2024-01-31 PROCEDURE — 99232 SBSQ HOSP IP/OBS MODERATE 35: CPT | Performed by: SURGERY

## 2024-01-31 PROCEDURE — 99238 HOSP IP/OBS DSCHRG MGMT 30/<: CPT | Performed by: SURGERY

## 2024-01-31 RX ADMIN — INSULIN GLARGINE 14 UNITS: 100 INJECTION, SOLUTION SUBCUTANEOUS at 12:14

## 2024-01-31 RX ADMIN — CLOPIDOGREL BISULFATE 75 MG: 75 TABLET ORAL at 12:14

## 2024-01-31 RX ADMIN — ATORVASTATIN CALCIUM 40 MG: 40 TABLET, FILM COATED ORAL at 12:15

## 2024-01-31 RX ADMIN — PANTOPRAZOLE SODIUM 20 MG: 20 TABLET, DELAYED RELEASE ORAL at 12:14

## 2024-01-31 RX ADMIN — EMPAGLIFLOZIN 10 MG: 10 TABLET, FILM COATED ORAL at 12:31

## 2024-01-31 RX ADMIN — NYSTATIN OINTMENT: 100000 OINTMENT TOPICAL at 12:26

## 2024-01-31 RX ADMIN — OXYCODONE HYDROCHLORIDE AND ACETAMINOPHEN 500 MG: 500 TABLET ORAL at 12:14

## 2024-01-31 RX ADMIN — METOPROLOL SUCCINATE 25 MG: 25 TABLET, EXTENDED RELEASE ORAL at 12:23

## 2024-01-31 RX ADMIN — FERROUS SULFATE TAB 325 MG (65 MG ELEMENTAL FE) 325 MG: 325 (65 FE) TAB at 12:30

## 2024-01-31 RX ADMIN — DILTIAZEM HYDROCHLORIDE 180 MG: 180 CAPSULE, EXTENDED RELEASE ORAL at 12:23

## 2024-01-31 RX ADMIN — TAMSULOSIN HYDROCHLORIDE 0.4 MG: 0.4 CAPSULE ORAL at 12:14

## 2024-01-31 ASSESSMENT — PAIN SCALES - WONG BAKER: WONGBAKER_NUMERICALRESPONSE: 0

## 2024-01-31 NOTE — PROGRESS NOTES
Patient transported via [facility] back to [facility]. Ostomy clean, dry and intact. No personal belongings at bedside. Wound care supplies sent with patient.     Report called and given to DAILY Woodard. Advised of wound care orders and a copy of discharge instructions in folder for patients sister.     Called patient's sister, Shefali, to advise patient has been picked up and in route to facility. Mrs. Meehan requested that I call  [place] advise [place] is in route. Called bu she was gone fr the day. Mrs Meehan called to advise; vm left.

## 2024-01-31 NOTE — DISCHARGE INSTRUCTIONS
Left heel covered with Aquacel Ag and Mepilex.  And bunny boot.  PT exercise left leg 1 to 3 hours.

## 2024-01-31 NOTE — PROGRESS NOTES
PROGRESS NOTE      Chief Complaints:  Patient examined this morning.  HPI and  Objective:    He looks comfortable.  No fever.  Patient is nonverbal.  Left foot is warm.  Heel wounds are fairly clean.  Review of Systems:  Rest of review of system reviewed personally and they are negative.    EXAM:  BP (!) 135/94   Pulse 78   Temp 98.2 °F (36.8 °C) (Axillary)   Resp 20   Ht 1.651 m (5' 5\")   Wt 83.2 kg (183 lb 8 oz) Comment: per nurse at [facility]  SpO2 98%   BMI 30.54 kg/m²     Patient is awake   Head and neck atraumatic, normocephalic.  ENT: No hoarse voice, gaze appropriate.  Cardiac system regular rate rhythm.  Pulmonary no audible wheeze, no cyanosis.  Chest wall excursion normal with respiration cycle  Abdomen is soft not particularly distended.  Neurologically nonfocal.  Hematology system: No bruising noted.  Musculoskeletal system: No joint deformity noted.  Genitourinary system: No hematuria noted.  Skin is warm and moist.  Psychosocial: Cooperative.  Vascular examination as previously noted no changes.    Current Facility-Administered Medications   Medication Dose Route Frequency Provider Last Rate Last Admin    0.9 % sodium chloride infusion   IntraVENous Continuous Roland Woods MD   Stopped at 01/31/24 0158    sodium bicarbonate 150 mEq in dextrose 5 % 1,000 mL infusion   IntraVENous Continuous Roland Woods MD   Stopped at 01/31/24 0158    sodium chloride flush 0.9 % injection 5-40 mL  5-40 mL IntraVENous 2 times per day Roland Woods MD        sodium chloride flush 0.9 % injection 5-40 mL  5-40 mL IntraVENous PRN Roland Woods MD        0.9 % sodium chloride infusion   IntraVENous PRN Roland Woods MD        acetaminophen (TYLENOL) tablet 650 mg  650 mg Oral Q4H PRN Roland Woods MD        ondansetron (ZOFRAN) injection 4 mg  4 mg IntraVENous Q6H PRN Roland Woods MD        sodium chloride flush 0.9 % injection 5-40 mL  5-40 mL IntraVENous 2 times per day Roland Woods MD   10  1803    insulin lispro (HUMALOG) injection vial 0-4 Units  0-4 Units SubCUTAneous TID WC Roland Woods MD        insulin lispro (HUMALOG) injection vial 0-4 Units  0-4 Units SubCUTAneous Nightly Roland Woods MD        glucose chewable tablet 16 g  4 tablet Oral PRN Roland Woods MD        dextrose bolus 10% 125 mL  125 mL IntraVENous PRN Roland Woods MD        Or    dextrose bolus 10% 250 mL  250 mL IntraVENous PRN Roland Woods MD        glucagon injection 1 mg  1 mg SubCUTAneous PRN Roland Woods MD        dextrose 10 % infusion   IntraVENous Continuous PRN Roland Woods MD        dilTIAZem (DILACOR XR) extended release capsule 180 mg  180 mg Oral Daily Roland Woods MD   180 mg at 01/30/24 1804    nystatin (MYCOSTATIN) ointment   Topical BID Roland Woods MD   Given at 01/30/24 2130    ferrous sulfate (IRON 325) tablet 325 mg  325 mg Oral BID  Roland Woods MD   325 mg at 01/30/24 1822           Recent Results (from the past 24 hour(s))   POCT Glucose    Collection Time: 01/30/24  8:45 AM   Result Value Ref Range    POC Glucose 116 (H) 65 - 100 mg/dL    Performed by: Flex Alejandro    CBC with Auto Differential    Collection Time: 01/30/24  9:06 AM   Result Value Ref Range    WBC 7.0 4.1 - 11.1 K/uL    RBC 4.37 4.10 - 5.70 M/uL    Hemoglobin 12.0 (L) 12.1 - 17.0 g/dL    Hematocrit 38.1 36.6 - 50.3 %    MCV 87.2 80.0 - 99.0 FL    MCH 27.5 26.0 - 34.0 PG    MCHC 31.5 30.0 - 36.5 g/dL    RDW 15.9 (H) 11.5 - 14.5 %    Platelets 226 150 - 400 K/uL    MPV 11.8 8.9 - 12.9 FL    Nucleated RBCs 0.0 0.0  WBC    nRBC 0.00 0.00 - 0.01 K/uL    Neutrophils % 55 32 - 75 %    Lymphocytes % 25 12 - 49 %    Monocytes % 13 5 - 13 %    Eosinophils % 7 0 - 7 %    Basophils % 0 0 - 1 %    Immature Granulocytes 0 0 - 0.5 %    Neutrophils Absolute 3.8 1.8 - 8.0 K/UL    Lymphocytes Absolute 1.7 0.8 - 3.5 K/UL    Monocytes Absolute 0.9 0.0 - 1.0 K/UL    Eosinophils Absolute 0.5 (H) 0.0 - 0.4 K/UL    Basophils

## 2024-01-31 NOTE — PLAN OF CARE
Problem: Chronic Conditions and Co-morbidities  Goal: Patient's chronic conditions and co-morbidity symptoms are monitored and maintained or improved  Outcome: Progressing  Flowsheets (Taken 1/31/2024 0530 by Sarah Pulido LPN)  Care Plan - Patient's Chronic Conditions and Co-Morbidity Symptoms are Monitored and Maintained or Improved: Monitor and assess patient's chronic conditions and comorbid symptoms for stability, deterioration, or improvement     Problem: Pain  Goal: Verbalizes/displays adequate comfort level or baseline comfort level  Outcome: Progressing     Problem: Discharge Planning  Goal: Discharge to home or other facility with appropriate resources  Recent Flowsheet Documentation  Taken 1/31/2024 0530 by Sarah Pulido LPN  Discharge to home or other facility with appropriate resources: Identify barriers to discharge with patient and caregiver

## 2024-01-31 NOTE — DISCHARGE SUMMARY
This is a discharge summary for Mark Anthony Ruiz, patient YOB: 1950.    Brief Hospital course: Patient underwent left leg angiogram and distal superficial femoral artery balloon PTA angioplasty.  Patient has a significant below-knee disease which could not be intervened.  However inflow to the knee to the foot is better.  Patient has currently nonhealing ulcer left heel.    Patient will be going back to nursing home this morning with the wound care instructions including Aquacel Ag and Mepilex on the left here with the bunny boot.  Patient also recommended physical therapy strengthening and range of motion left leg 1 to 3 hours at nursing home.

## 2024-01-31 NOTE — CARE COORDINATION
Transition of Care Plan:    RUR: N/A  Prior Level of Functioning: LTC at SNF  Disposition: LTC at SNF  If SNF or IPR: Date FOC offered: 01/31/24  Date FOC received: 01/31/24  Accepting facility: [facility]  Date authorization started with reference number: N/A  Date authorization received and expires: N/A  Follow up appointments: per discharge summary   DME needed: N/A  Transportation at discharge: ambulance  IM/IMM Medicare/ letter given: N/A  Is patient a [place] and [facility] with [facility]?    If yes, was [place] transfer form completed and [facility] notified?   Caregiver Contact: Shefali Worrell, sister  Discharge Caregiver contacted prior to discharge? Yes  Care Conference needed? N/A  Barriers to discharge: N/A    Patient is being discharged to [facility] located at [address] [place] [address]. He will return to room 36B. Report may be called to [phone].

## 2024-01-31 NOTE — PLAN OF CARE
Problem: Chronic Conditions and Co-morbidities  Goal: Patient's chronic conditions and co-morbidity symptoms are monitored and maintained or improved  1/31/2024 1533 by Roni Snyder RN  Outcome: Completed  1/31/2024 1355 by Roni Snyder RN  Outcome: Progressing  Flowsheets (Taken 1/31/2024 0530 by Sarah Pulido LPN)  Care Plan - Patient's Chronic Conditions and Co-Morbidity Symptoms are Monitored and Maintained or Improved: Monitor and assess patient's chronic conditions and comorbid symptoms for stability, deterioration, or improvement     Problem: Pain  Goal: Verbalizes/displays adequate comfort level or baseline comfort level  1/31/2024 1533 by Roni Snyder RN  Outcome: Completed  1/31/2024 1355 by Roni Snyder RN  Outcome: Progressing     Problem: Safety - Adult  Goal: Free from fall injury  Outcome: Completed     Problem: ABCDS Injury Assessment  Goal: Absence of physical injury  Outcome: Completed     Problem: Skin/Tissue Integrity  Goal: Absence of new skin breakdown  Description: 1.  Monitor for areas of redness and/or skin breakdown  2.  Assess vascular access sites hourly  3.  Every 4-6 hours minimum:  Change oxygen saturation probe site  4.  Every 4-6 hours:  If on nasal continuous positive airway pressure, respiratory therapy assess nares and determine need for appliance change or resting period.  Outcome: Completed     Problem: Discharge Planning  Goal: Discharge to home or other facility with appropriate resources  Outcome: Completed  Flowsheets (Taken 1/31/2024 0530 by Sarah Pulido LPN)  Discharge to home or other facility with appropriate resources: Identify barriers to discharge with patient and caregiver

## 2024-01-31 NOTE — PROGRESS NOTES
4 Eyes Skin Assessment     NAME:  Mark Anthony Ruiz  YOB: 1950  MEDICAL RECORD NUMBER:  [id number]    The patient is being assessed for  Admission    I agree that at least one RN has performed a thorough Head to Toe Skin Assessment on the patient. ALL assessment sites listed below have been assessed.      Areas assessed by both nurses:    Head, Face, Ears, Shoulders, Back, Chest, Arms, Elbows, Hands, Sacrum. Buttock, Coccyx, Ischium, Legs. Feet and Heels, and Under Medical Devices         Does the Patient have a Wound? Yes wound(s) were present on assessment. LDA wound assessment was Initiated and completed by RN       Mike Prevention initiated by RN: Yes  Wound Care Orders initiated by RN: No    Pressure Injury (Stage 3,4, Unstageable, DTI, NWPT, and Complex wounds) if present, place Wound referral order by RN under : No    New Ostomies, if present place, Ostomy referral order under : No     Nurse 1 eSignature: Electronically signed by Chana Carcamo RN on 1/31/24 at 4:49 AM EST    **SHARE this note so that the co-signing nurse can place an eSignature**    Nurse 2 eSignature: {Esignature:[id number]}

## 2024-01-31 NOTE — PROGRESS NOTES
4 Eyes Skin Assessment     NAME:  Mark Anthony Ruiz  YOB: 1950  MEDICAL RECORD NUMBER:  [id number]    The patient is being assessed for  Admission    I agree that at least one RN has performed a thorough Head to Toe Skin Assessment on the patient. ALL assessment sites listed below have been assessed.      Areas assessed by both nurses:    Head, Face, Ears, Shoulders, Back, Chest, Arms, Elbows, Hands, Sacrum. Buttock, Coccyx, Ischium, and Legs. Feet and Heels        Does the Patient have a Wound? Yes wound(s) were present on assessment. LDA wound assessment was Initiated and completed by RN       Mike Prevention initiated by RN: Yes  Wound Care Orders initiated by RN: Yes    Pressure Injury (Stage 3,4, Unstageable, DTI, NWPT, and Complex wounds) if present, place Wound referral order by RN under : Yes    New Ostomies, if present place, Ostomy referral order under : No     Nurse 1 eSignature: Electronically signed by Sarah Pulido LPN on 1/31/24 at 6:22 AM EST    **SHARE this note so that the co-signing nurse can place an eSignature**    Nurse 2 eSignature: {Esignature:[id number]}

## 2024-02-01 LAB
BACTERIA SPEC CULT: NORMAL
GRAM STN SPEC: NORMAL
Lab: NORMAL

## 2024-02-12 ENCOUNTER — OFFICE VISIT (OUTPATIENT)
Age: 74
End: 2024-02-12

## 2024-02-12 VITALS
HEART RATE: 103 BPM | OXYGEN SATURATION: 98 % | BODY MASS INDEX: 30.54 KG/M2 | SYSTOLIC BLOOD PRESSURE: 150 MMHG | DIASTOLIC BLOOD PRESSURE: 80 MMHG | TEMPERATURE: 99 F | HEIGHT: 65 IN

## 2024-02-12 DIAGNOSIS — I73.9 PVD (PERIPHERAL VASCULAR DISEASE) (HCC): Primary | ICD-10-CM

## 2024-02-12 RX ORDER — INSULIN ASPART 100 [IU]/ML
INJECTION, SOLUTION INTRAVENOUS; SUBCUTANEOUS
COMMUNITY
Start: 2023-12-14

## 2024-02-12 RX ORDER — DULAGLUTIDE 3 MG/.5ML
INJECTION, SOLUTION SUBCUTANEOUS
COMMUNITY
Start: 2023-11-16

## 2024-02-12 RX ORDER — PEN NEEDLE, DIABETIC 32GX 5/32"
NEEDLE, DISPOSABLE MISCELLANEOUS
COMMUNITY
Start: 2024-01-24

## 2024-02-12 RX ORDER — INSULIN DETEMIR 100 [IU]/ML
INJECTION, SOLUTION SUBCUTANEOUS
COMMUNITY
Start: 2024-01-11

## 2024-02-12 NOTE — PROGRESS NOTES
Identified pt with two pt identifiers(name and ). Reviewed record in preparation for visit and have obtained necessary documentation. All patient medications has been reviewed.  Chief Complaint   Patient presents with    Post-Op Check       Vitals:    24 1341   BP: (!) 150/80   Pulse: (!) 103   Temp: 99 °F (37.2 °C)   SpO2: 98%                   Coordination of Care Questionnaire:   1) Have you been to an emergency room, urgent care, or hospitalized since your last visit?   no    2. Have seen or consulted any other health care provider since your last visit? no        Patient is accompanied by sister I have received verbal consent from [facility] to discuss any/all medical information while they are present in the room.

## 2024-02-23 NOTE — PROGRESS NOTES
Vascular History and Physical    Patient: Mark Anthony Ruiz  MRN: [id number]    YOB: 1950  Age: 73 y.o.  Sex: male     Chief Complaint:  Chief Complaint   Patient presents with    Post-Op Check       History of Present Illness: Mark Anthony Ruiz is a 73 y.o. very pleasant gentleman is here today from nursing home to reexamine the left heel.  Patient underwent angioplasty left leg.  Currently wounds are covered with Aquacel Ag, Mepilex and bunny boots.  Patient is present with his sister today.  Patient's colostomy is working well.    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 (HCC)     BPH (benign prostatic hyperplasia)     Cataract     Chronic kidney disease     Convulsions (HCC)     Diabetes (HCC)     Dysphagia     Hip joint stiffness, bilateral     Hypercholesteremia     Hyperlipidemia     Mixed receptive-expressive language disorder     Muscle weakness (generalized)     Neurological disorder     Nonspeaking deaf     Peripheral vascular disease (HCC)     Pressure ulcer of sacral region, stage 3 (HCC)     Schizophrenia (HCC)     Volvulus (HCC)        Surgical History:  Past Surgical History:   Procedure Laterality Date    INVASIVE VASCULAR N/A 1/30/2024    Angiography lower ext left performed by Roland Woods MD at [facility] ELECTROPHYSIOLOGY    INVASIVE VASCULAR N/A 1/30/2024    Aortagram abdominal performed by Roland Woods MD at [facility] ELECTROPHYSIOLOGY    INVASIVE VASCULAR N/A 1/30/2024    Ultrasound guided vascular access performed by Roland Woods MD at [facility] ELECTROPHYSIOLOGY    INVASIVE VASCULAR N/A 1/30/2024    Pta femoral popliteal artery performed by Roland Woods MD at [facility] ELECTROPHYSIOLOGY       Allergies:  Allergies   Allergen Reactions    Lisinopril      Other reaction(s): Unknown

## 2024-05-13 ENCOUNTER — OFFICE VISIT (OUTPATIENT)
Age: 74
End: 2024-05-13
Payer: MEDICARE

## 2024-05-13 VITALS
HEIGHT: 65 IN | RESPIRATION RATE: 16 BRPM | DIASTOLIC BLOOD PRESSURE: 74 MMHG | TEMPERATURE: 98 F | OXYGEN SATURATION: 98 % | SYSTOLIC BLOOD PRESSURE: 139 MMHG | HEART RATE: 96 BPM | BODY MASS INDEX: 30.54 KG/M2

## 2024-05-13 DIAGNOSIS — I73.9 PAD (PERIPHERAL ARTERY DISEASE) (HCC): ICD-10-CM

## 2024-05-13 DIAGNOSIS — L97.425: Primary | ICD-10-CM

## 2024-05-13 DIAGNOSIS — E13.621: Primary | ICD-10-CM

## 2024-05-13 PROCEDURE — 1123F ACP DISCUSS/DSCN MKR DOCD: CPT | Performed by: SURGERY

## 2024-05-13 PROCEDURE — 3017F COLORECTAL CA SCREEN DOC REV: CPT | Performed by: SURGERY

## 2024-05-13 PROCEDURE — 3075F SYST BP GE 130 - 139MM HG: CPT | Performed by: SURGERY

## 2024-05-13 PROCEDURE — 99212 OFFICE O/P EST SF 10 MIN: CPT | Performed by: SURGERY

## 2024-05-13 PROCEDURE — G8427 DOCREV CUR MEDS BY ELIG CLIN: HCPCS | Performed by: SURGERY

## 2024-05-13 PROCEDURE — 3046F HEMOGLOBIN A1C LEVEL >9.0%: CPT | Performed by: SURGERY

## 2024-05-13 PROCEDURE — 3078F DIAST BP <80 MM HG: CPT | Performed by: SURGERY

## 2024-05-13 PROCEDURE — G8417 CALC BMI ABV UP PARAM F/U: HCPCS | Performed by: SURGERY

## 2024-05-13 PROCEDURE — 2022F DILAT RTA XM EVC RTNOPTHY: CPT | Performed by: SURGERY

## 2024-05-13 PROCEDURE — 1036F TOBACCO NON-USER: CPT | Performed by: SURGERY

## 2024-05-13 ASSESSMENT — PATIENT HEALTH QUESTIONNAIRE - PHQ9: DEPRESSION UNABLE TO ASSESS: FUNCTIONAL CAPACITY MOTIVATION LIMITS ACCURACY

## 2024-05-13 NOTE — PROGRESS NOTES
Identified pt with two pt identifiers (name and ). Reviewed chart in preparation for visit and have obtained necessary documentation.    Mark Anthony Ruiz is a 73 y.o. male  Chief Complaint   Patient presents with    Follow-up     Left Heel and right lower leg      /74 (Site: Left Upper Arm, Position: Sitting, Cuff Size: Large Adult)   Pulse 96   Temp 98 °F (36.7 °C) (Oral)   Resp 16   Ht 1.651 m (5' 5\")   SpO2 98%   BMI 30.54 kg/m²     1. Have you been to the ER, urgent care clinic since your last visit?  Hospitalized since your last visit?NO    2. Have you seen or consulted any other health care providers outside of the [facility] since your last visit?  Include any pap smears or colon screening. NO

## 2024-05-30 ENCOUNTER — OFFICE VISIT (OUTPATIENT)
Age: 74
End: 2024-05-30
Payer: MEDICARE

## 2024-05-30 VITALS
RESPIRATION RATE: 17 BRPM | TEMPERATURE: 98 F | DIASTOLIC BLOOD PRESSURE: 80 MMHG | SYSTOLIC BLOOD PRESSURE: 125 MMHG | BODY MASS INDEX: 30.54 KG/M2 | OXYGEN SATURATION: 97 % | HEART RATE: 58 BPM | HEIGHT: 65 IN

## 2024-05-30 DIAGNOSIS — L97.509 FOOT ULCER DUE TO SECONDARY DM (HCC): ICD-10-CM

## 2024-05-30 DIAGNOSIS — I73.9 PVD (PERIPHERAL VASCULAR DISEASE) (HCC): Primary | ICD-10-CM

## 2024-05-30 DIAGNOSIS — E13.621 FOOT ULCER DUE TO SECONDARY DM (HCC): ICD-10-CM

## 2024-05-30 PROCEDURE — G8427 DOCREV CUR MEDS BY ELIG CLIN: HCPCS | Performed by: SURGERY

## 2024-05-30 PROCEDURE — 1036F TOBACCO NON-USER: CPT | Performed by: SURGERY

## 2024-05-30 PROCEDURE — 3079F DIAST BP 80-89 MM HG: CPT | Performed by: SURGERY

## 2024-05-30 PROCEDURE — G8417 CALC BMI ABV UP PARAM F/U: HCPCS | Performed by: SURGERY

## 2024-05-30 PROCEDURE — 99212 OFFICE O/P EST SF 10 MIN: CPT | Performed by: SURGERY

## 2024-05-30 PROCEDURE — 3046F HEMOGLOBIN A1C LEVEL >9.0%: CPT | Performed by: SURGERY

## 2024-05-30 PROCEDURE — 3017F COLORECTAL CA SCREEN DOC REV: CPT | Performed by: SURGERY

## 2024-05-30 PROCEDURE — 1123F ACP DISCUSS/DSCN MKR DOCD: CPT | Performed by: SURGERY

## 2024-05-30 PROCEDURE — 3074F SYST BP LT 130 MM HG: CPT | Performed by: SURGERY

## 2024-05-30 RX ORDER — METOPROLOL SUCCINATE 25 MG/1
TABLET, EXTENDED RELEASE ORAL
COMMUNITY
Start: 2024-05-22

## 2024-05-30 RX ORDER — ONDANSETRON 4 MG/1
TABLET, FILM COATED ORAL
COMMUNITY
Start: 2024-04-28

## 2024-05-31 NOTE — PROGRESS NOTES
Vascular History and Physical    Patient: Mark Anthony Ruiz  MRN: [id number]    YOB: 1950  Age: 73 y.o.  Sex: male     Chief Complaint:  Chief Complaint   Patient presents with    Follow-up    Wound Check       History of Present Illness: Mark Anthony Ruiz is a 73 y.o. very pleasant gentleman is here today for follow-up reassess bilateral leg wound.  Last time I did recommend to apply Mepilex coverage with Aquacel Ag.  Apparently this was not done.  Also recommend bunny boots as well as well as physical therapy for lower extremity strengthening and range of motion.  Patient is accompanied by with his sister today.    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 (HCC)     BPH (benign prostatic hyperplasia)     Cataract     Chronic kidney disease     Convulsions (HCC)     Diabetes (HCC)     Dysphagia     Hip joint stiffness, bilateral     Hypercholesteremia     Hyperlipidemia     Mixed receptive-expressive language disorder     Muscle weakness (generalized)     Neurological disorder     Nonspeaking deaf     Peripheral vascular disease (HCC)     Pressure ulcer of sacral region, stage 3 (HCC)     Schizophrenia (HCC)     Volvulus (HCC)        Surgical History:  Past Surgical History:   Procedure Laterality Date    INVASIVE VASCULAR N/A 1/30/2024    Angiography lower ext left performed by Roland Woods MD at [facility] ELECTROPHYSIOLOGY    INVASIVE VASCULAR N/A 1/30/2024    Aortagram abdominal performed by Roland Woods MD at [facility] ELECTROPHYSIOLOGY    INVASIVE VASCULAR N/A 1/30/2024    Ultrasound guided vascular access performed by Roland Woods MD at [facility] ELECTROPHYSIOLOGY    INVASIVE VASCULAR N/A 1/30/2024    Pta femoral popliteal artery performed by Roland Woods MD at [facility] ELECTROPHYSIOLOGY

## 2024-06-20 ENCOUNTER — OFFICE VISIT (OUTPATIENT)
Age: 74
End: 2024-06-20
Payer: MEDICARE

## 2024-06-20 VITALS
HEART RATE: 85 BPM | OXYGEN SATURATION: 97 % | TEMPERATURE: 98.3 F | DIASTOLIC BLOOD PRESSURE: 89 MMHG | BODY MASS INDEX: 30.54 KG/M2 | RESPIRATION RATE: 16 BRPM | SYSTOLIC BLOOD PRESSURE: 140 MMHG | HEIGHT: 65 IN

## 2024-06-20 DIAGNOSIS — L97.425: ICD-10-CM

## 2024-06-20 DIAGNOSIS — E13.621: ICD-10-CM

## 2024-06-20 DIAGNOSIS — I70.202 FEMORAL ARTERY STENOSIS, LEFT (HCC): Primary | ICD-10-CM

## 2024-06-20 PROCEDURE — G8417 CALC BMI ABV UP PARAM F/U: HCPCS | Performed by: SURGERY

## 2024-06-20 PROCEDURE — 1123F ACP DISCUSS/DSCN MKR DOCD: CPT | Performed by: SURGERY

## 2024-06-20 PROCEDURE — 2022F DILAT RTA XM EVC RTNOPTHY: CPT | Performed by: SURGERY

## 2024-06-20 PROCEDURE — 3017F COLORECTAL CA SCREEN DOC REV: CPT | Performed by: SURGERY

## 2024-06-20 PROCEDURE — 99212 OFFICE O/P EST SF 10 MIN: CPT | Performed by: SURGERY

## 2024-06-20 PROCEDURE — G8427 DOCREV CUR MEDS BY ELIG CLIN: HCPCS | Performed by: SURGERY

## 2024-06-20 PROCEDURE — 3079F DIAST BP 80-89 MM HG: CPT | Performed by: SURGERY

## 2024-06-20 PROCEDURE — 1036F TOBACCO NON-USER: CPT | Performed by: SURGERY

## 2024-06-20 PROCEDURE — 3046F HEMOGLOBIN A1C LEVEL >9.0%: CPT | Performed by: SURGERY

## 2024-06-20 PROCEDURE — 3077F SYST BP >= 140 MM HG: CPT | Performed by: SURGERY

## 2024-06-20 ASSESSMENT — PATIENT HEALTH QUESTIONNAIRE - PHQ9
SUM OF ALL RESPONSES TO PHQ QUESTIONS 1-9: 0
1. LITTLE INTEREST OR PLEASURE IN DOING THINGS: NOT AT ALL
SUM OF ALL RESPONSES TO PHQ QUESTIONS 1-9: 0
SUM OF ALL RESPONSES TO PHQ QUESTIONS 1-9: 0
DEPRESSION UNABLE TO ASSESS: FUNCTIONAL CAPACITY MOTIVATION LIMITS ACCURACY
SUM OF ALL RESPONSES TO PHQ9 QUESTIONS 1 & 2: 0
2. FEELING DOWN, DEPRESSED OR HOPELESS: NOT AT ALL
SUM OF ALL RESPONSES TO PHQ QUESTIONS 1-9: 0

## 2024-06-20 NOTE — PROGRESS NOTES
Identified pt with two pt identifiers (name and ). Reviewed chart in preparation for visit and have obtained necessary documentation.    Mark Anthony Ruiz is a 73 y.o. male  Chief Complaint   Patient presents with    Follow-up    Wound Check     Bilateral leg      BP (!) 140/89 (Site: Right Upper Arm, Position: Sitting, Cuff Size: Large Adult)   Pulse 85   Temp 98.3 °F (36.8 °C) (Oral)   Resp 16   Ht 1.651 m (5' 5\")   SpO2 97%   BMI 30.54 kg/m²     1. Have you been to the ER, urgent care clinic since your last visit?  Hospitalized since your last visit?NO    2. Have you seen or consulted any other health care providers outside of the [facility] since your last visit?  Include any pap smears or colon screening. NO

## 2024-09-19 ENCOUNTER — OFFICE VISIT (OUTPATIENT)
Age: 74
End: 2024-09-19
Payer: MEDICARE

## 2024-09-19 VITALS
RESPIRATION RATE: 18 BRPM | HEIGHT: 65 IN | SYSTOLIC BLOOD PRESSURE: 126 MMHG | HEART RATE: 100 BPM | OXYGEN SATURATION: 98 % | DIASTOLIC BLOOD PRESSURE: 69 MMHG | BODY MASS INDEX: 30.54 KG/M2 | TEMPERATURE: 98.4 F

## 2024-09-19 DIAGNOSIS — I77.9 PERIPHERAL ARTERIAL OCCLUSIVE DISEASE (HCC): Primary | ICD-10-CM

## 2024-09-19 DIAGNOSIS — E13.621: ICD-10-CM

## 2024-09-19 DIAGNOSIS — L97.425: ICD-10-CM

## 2024-09-19 PROCEDURE — 3017F COLORECTAL CA SCREEN DOC REV: CPT | Performed by: SURGERY

## 2024-09-19 PROCEDURE — 3078F DIAST BP <80 MM HG: CPT | Performed by: SURGERY

## 2024-09-19 PROCEDURE — G8427 DOCREV CUR MEDS BY ELIG CLIN: HCPCS | Performed by: SURGERY

## 2024-09-19 PROCEDURE — 2022F DILAT RTA XM EVC RTNOPTHY: CPT | Performed by: SURGERY

## 2024-09-19 PROCEDURE — 1036F TOBACCO NON-USER: CPT | Performed by: SURGERY

## 2024-09-19 PROCEDURE — 99212 OFFICE O/P EST SF 10 MIN: CPT | Performed by: SURGERY

## 2024-09-19 PROCEDURE — 3074F SYST BP LT 130 MM HG: CPT | Performed by: SURGERY

## 2024-09-19 PROCEDURE — 3046F HEMOGLOBIN A1C LEVEL >9.0%: CPT | Performed by: SURGERY

## 2024-09-19 PROCEDURE — 1123F ACP DISCUSS/DSCN MKR DOCD: CPT | Performed by: SURGERY

## 2024-09-19 PROCEDURE — G8417 CALC BMI ABV UP PARAM F/U: HCPCS | Performed by: SURGERY

## 2024-09-19 ASSESSMENT — PATIENT HEALTH QUESTIONNAIRE - PHQ9
SUM OF ALL RESPONSES TO PHQ QUESTIONS 1-9: 0
SUM OF ALL RESPONSES TO PHQ QUESTIONS 1-9: 0
SUM OF ALL RESPONSES TO PHQ9 QUESTIONS 1 & 2: 0
1. LITTLE INTEREST OR PLEASURE IN DOING THINGS: NOT AT ALL
2. FEELING DOWN, DEPRESSED OR HOPELESS: NOT AT ALL
SUM OF ALL RESPONSES TO PHQ QUESTIONS 1-9: 0
SUM OF ALL RESPONSES TO PHQ QUESTIONS 1-9: 0
DEPRESSION UNABLE TO ASSESS: FUNCTIONAL CAPACITY MOTIVATION LIMITS ACCURACY

## 2024-09-25 PROBLEM — I77.9 PERIPHERAL ARTERIAL OCCLUSIVE DISEASE (HCC): Status: ACTIVE | Noted: 2024-01-30

## 2025-03-20 ENCOUNTER — OFFICE VISIT (OUTPATIENT)
Age: 75
End: 2025-03-20
Payer: MEDICARE

## 2025-03-20 VITALS
OXYGEN SATURATION: 96 % | DIASTOLIC BLOOD PRESSURE: 77 MMHG | RESPIRATION RATE: 19 BRPM | TEMPERATURE: 98.3 F | HEIGHT: 65 IN | HEART RATE: 100 BPM | SYSTOLIC BLOOD PRESSURE: 138 MMHG | BODY MASS INDEX: 30.54 KG/M2

## 2025-03-20 DIAGNOSIS — I73.9 PVD (PERIPHERAL VASCULAR DISEASE): Primary | ICD-10-CM

## 2025-03-20 PROCEDURE — 99212 OFFICE O/P EST SF 10 MIN: CPT | Performed by: SURGERY

## 2025-03-20 PROCEDURE — 3078F DIAST BP <80 MM HG: CPT | Performed by: SURGERY

## 2025-03-20 PROCEDURE — G8427 DOCREV CUR MEDS BY ELIG CLIN: HCPCS | Performed by: SURGERY

## 2025-03-20 PROCEDURE — G8417 CALC BMI ABV UP PARAM F/U: HCPCS | Performed by: SURGERY

## 2025-03-20 PROCEDURE — 1036F TOBACCO NON-USER: CPT | Performed by: SURGERY

## 2025-03-20 PROCEDURE — 3075F SYST BP GE 130 - 139MM HG: CPT | Performed by: SURGERY

## 2025-03-20 PROCEDURE — 1123F ACP DISCUSS/DSCN MKR DOCD: CPT | Performed by: SURGERY

## 2025-03-20 PROCEDURE — 1126F AMNT PAIN NOTED NONE PRSNT: CPT | Performed by: SURGERY

## 2025-03-20 ASSESSMENT — PATIENT HEALTH QUESTIONNAIRE - PHQ9: DEPRESSION UNABLE TO ASSESS: FUNCTIONAL CAPACITY MOTIVATION LIMITS ACCURACY

## 2025-03-20 NOTE — PROGRESS NOTES
Identified pt with two pt identifiers (name and ). Reviewed chart in preparation for visit and have obtained necessary documentation.    Mark Anthony Ruiz is a 74 y.o. male  Chief Complaint   Patient presents with    Follow-up    Wound Check     /77 (BP Site: Left Lower Arm, Patient Position: Sitting, BP Cuff Size: Large Adult)   Pulse 100   Temp 98.3 °F (36.8 °C) (Temporal)   Resp 19   Ht 1.651 m (5' 5\")   SpO2 96%   BMI 30.54 kg/m²

## 2025-03-27 NOTE — PROGRESS NOTES
Vascular History and Physical    Patient: Mark Anthony Ruiz  MRN: [id number]    YOB: 1950  Age: 74 y.o.  Sex: male     Chief Complaint:  Chief Complaint   Patient presents with    Follow-up    Wound Check       History of Present Illness: Mark Anthony Ruiz is a 74 y.o. gentleman is here today follow-up vascular assessment both leg.  Patient is currently bed bound. He has bunny boots. Previous left heel ulcer has healed.    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 (HCC)     BPH (benign prostatic hyperplasia)     Cataract     Chronic kidney disease     Convulsions (HCC)     Diabetes (HCC)     Dysphagia     Hip joint stiffness, bilateral     Hypercholesteremia     Hyperlipidemia     Mixed receptive-expressive language disorder     Muscle weakness (generalized)     Neurological disorder     Nonspeaking deaf     Peripheral vascular disease     Pressure ulcer of sacral region, stage 3 (HCC)     Schizophrenia (HCC)     Volvulus (HCC)        Surgical History:  Past Surgical History:   Procedure Laterality Date    INVASIVE VASCULAR N/A 1/30/2024    Angiography lower ext left performed by Roland Woods MD at [facility] ELECTROPHYSIOLOGY    INVASIVE VASCULAR N/A 1/30/2024    Aortagram abdominal performed by Roland Woods MD at [facility] ELECTROPHYSIOLOGY    INVASIVE VASCULAR N/A 1/30/2024    Ultrasound guided vascular access performed by Roland Woods MD at [facility] ELECTROPHYSIOLOGY    INVASIVE VASCULAR N/A 1/30/2024    Pta femoral popliteal artery performed by Roland Woods MD at [facility] ELECTROPHYSIOLOGY       Allergies:  Allergies   Allergen Reactions    Lisinopril      Other reaction(s): Unknown (comments)  Unknown-pt unable to answer       Current Meds:  Current Outpatient Medications   Medication Sig Dispense Refill

## (undated) DEVICE — 3M™ TEGADERM™ TRANSPARENT FILM DRESSING FIRST AID STYLE, 1621, 4 IN X 4-3/4 IN, 50 BAGS/CARTON, 4 CARTONS/CASE: Brand: 3M™ TEGADERM™

## (undated) DEVICE — PREP SKN CHLRAPRP APL 26ML STR --

## (undated) DEVICE — YANKAUER,BULB TIP,W/O VENT,RIGID,STERILE: Brand: MEDLINE

## (undated) DEVICE — SPONGE GZ W4XL4IN COT 12 PLY TYP VII WVN C FLD DSGN

## (undated) DEVICE — SURGICAL PROCEDURE TRAY CRD CATH 3 PRT

## (undated) DEVICE — SOLUTION IRRIG 1000ML 0.9% SOD CHL USP POUR PLAS BTL

## (undated) DEVICE — BLADE ELECTRODE: Brand: EDGE

## (undated) DEVICE — Device

## (undated) DEVICE — DESTINATION PERIPHERAL GUIDING SHEATH: Brand: DESTINATION

## (undated) DEVICE — MICROPUNCTURE INTRODUCER SET SILHOUETTE TRANSITIONLESS PUSH-PLUS DESIGN - STIFFENED CANNULA WITH STAINLESS STEEL WIRE GUIDE: Brand: MICROPUNCTURE

## (undated) DEVICE — SUTURE ABSORBABLE MONOFILAMENT 1-0 TP1 54 IN VIO PDS + PDP879G

## (undated) DEVICE — COVER PRB INTOP 96X6 IN LF

## (undated) DEVICE — CATHETER MARINER BRAID RIM 5FX65 CM .035 IN.DIAGNOSTIC

## (undated) DEVICE — SYRINGE IRRIG 60ML SFT PLIABLE BLB EZ TO GRP 1 HND USE W/

## (undated) DEVICE — STAPLER INT L55MM CUT LN L53MM STPL LN L57MM BLU B FRM 8

## (undated) DEVICE — RADIFOCUS GLIDEWIRE: Brand: GLIDEWIRE

## (undated) DEVICE — DRAIN SURG W7XL20CM SIL SMOOTH FLAT 3/4 PERF DBL WRP

## (undated) DEVICE — PINNACLE INTRODUCER SHEATH: Brand: PINNACLE

## (undated) DEVICE — INTENDED FOR TISSUE SEPARATION, AND OTHER PROCEDURES THAT REQUIRE A SHARP SURGICAL BLADE TO PUNCTURE OR CUT.: Brand: BARD-PARKER ® CARBON RIB-BACK BLADES

## (undated) DEVICE — 2-PIECE OSTOMY SKIN BARRIER, FLEXWEAR: Brand: NEW IMAGE

## (undated) DEVICE — SOUTHSIDE TURNOVER: Brand: MEDLINE INDUSTRIES, INC.

## (undated) DEVICE — HT WINN 40 GUIDE WIRE .014" X 300 CM: Brand: HI-TORQUE WINN

## (undated) DEVICE — BASIC SINGLE BASIN-LF: Brand: MEDLINE INDUSTRIES, INC.

## (undated) DEVICE — STAPLER SKIN H3.9MM WIRE DIA0.58MM CRWN 6.9MM 35 STPL FIX

## (undated) DEVICE — SUTURE VCRL + SZ 0 L27IN ABSRB UD L36MM CT-1 1/2 CIR VCPP41D

## (undated) DEVICE — TOWEL,OR,DSP,ST,BLUE,DLX,6/PK,12PK/CS: Brand: MEDLINE

## (undated) DEVICE — NON-WOVEN ADHESIVE WOUND DRESSING: Brand: PRIMAPORE ADHESIVE DRESSING 30*10CM

## (undated) DEVICE — NAVICROSS SUPPORT CATHETER: Brand: NAVICROSS

## (undated) DEVICE — HI-TORQUE BALANCE MIDDLEWEIGHT UNIVERSAL II GUIDE WIRE STRAIGHT TIP PAK  300 CM: Brand: HI-TORQUE BALANCE MIDDLEWEIGHT UNIVERSAL II

## (undated) DEVICE — Device: Brand: QUICK-CROSS SUPPORT CATHETER

## (undated) DEVICE — TOTAL TRAY, 16FR 10ML SIL FOLEY, URN: Brand: MEDLINE

## (undated) DEVICE — GAUZE,SPONGE,4"X4",16PLY,STRL,LF,10/TRAY: Brand: MEDLINE

## (undated) DEVICE — GLOW 'N TELL 55CM TAPE (20 STRIPS): Brand: VASCUTAPE RADIOPAQUE TAPE

## (undated) DEVICE — HI-TORQUE SPARTACORE 14 PERIPHERAL GUIDE WIRE .014 10 CM X 300 CM: Brand: SPARTACORE

## (undated) DEVICE — MAJOR LAP PROCEDURE PACK: Brand: MEDLINE INDUSTRIES, INC.

## (undated) DEVICE — GARMENT,MEDLINE,DVT,INT,CALF,MED, GEN2: Brand: MEDLINE

## (undated) DEVICE — 3-0 COATED VICRYL PLUS UNDYED 1X27" SH --

## (undated) DEVICE — SEALER TISS L20CM DIA13MM ADV BPLR L CRV JAW OPN APPRCH

## (undated) DEVICE — SUTURE PERMA-HAND 0 L18IN NONABSORBABLE BLK CT-1 L36MM 1/2 C021D

## (undated) DEVICE — CATHETER PTCA L130CM BLLN L80MM DIA6MM DRUG COAT BLLN

## (undated) DEVICE — FOLEY TRAY URIMTR PEDIATRIC 16 FR 5 CC STR ADV LUBRI SIL IC

## (undated) DEVICE — DEVICE INFL SYR BLLN ENDO 30 -- INTELLI

## (undated) DEVICE — TOWEL SURG W17XL27IN STD BLU COT NONFENESTRATED PREWASHED

## (undated) DEVICE — RELOAD STPL L55MM OPN H3.8MM CLS H1.5MM WIRE DIA0.2MM REG

## (undated) DEVICE — GLOVE ORANGE PI 7 1/2   MSG9075

## (undated) DEVICE — SUTURE PERMAHAND SZ 2-0 L30IN NONABSORBABLE BLK SILK W/O A305H

## (undated) DEVICE — SUTURE PERMAHAND SZ 3-0 L18IN NONABSORBABLE BLK L26MM SH C013D

## (undated) DEVICE — 96"PROBE COVER (US)10PK: Brand: SITE-RITE

## (undated) DEVICE — 3M™ SURGICAL CLIPPER WITH PIVOTING HEAD, 9660, 50/CASE: Brand: 3M™

## (undated) DEVICE — CATHETER PTCA L150CM BLLN L60MM OD2MM SHTH OD4FR 0.014IN

## (undated) DEVICE — CATH BLLN PTA .14 2X120X150 -- NANOCROSS ELITE OTW

## (undated) DEVICE — DRAPE IRRIG FLD WRM W44XL66IN W/ AORN STD PRTBL INTRATEMP

## (undated) DEVICE — SUT ETHLN 2-0 18IN FS BLK --